# Patient Record
Sex: FEMALE | Race: BLACK OR AFRICAN AMERICAN | NOT HISPANIC OR LATINO
[De-identification: names, ages, dates, MRNs, and addresses within clinical notes are randomized per-mention and may not be internally consistent; named-entity substitution may affect disease eponyms.]

---

## 2023-01-01 ENCOUNTER — NON-APPOINTMENT (OUTPATIENT)
Age: 58
End: 2023-01-01

## 2023-01-01 ENCOUNTER — INPATIENT (INPATIENT)
Facility: HOSPITAL | Age: 58
LOS: 7 days | Discharge: HOME | End: 2023-09-14
Attending: STUDENT IN AN ORGANIZED HEALTH CARE EDUCATION/TRAINING PROGRAM | Admitting: STUDENT IN AN ORGANIZED HEALTH CARE EDUCATION/TRAINING PROGRAM
Payer: COMMERCIAL

## 2023-01-01 ENCOUNTER — RX RENEWAL (OUTPATIENT)
Age: 58
End: 2023-01-01

## 2023-01-01 ENCOUNTER — TRANSCRIPTION ENCOUNTER (OUTPATIENT)
Age: 58
End: 2023-01-01

## 2023-01-01 ENCOUNTER — EMERGENCY (EMERGENCY)
Facility: HOSPITAL | Age: 58
LOS: 1 days | Discharge: HOME | End: 2023-01-01
Attending: EMERGENCY MEDICINE | Admitting: EMERGENCY MEDICINE
Payer: COMMERCIAL

## 2023-01-01 ENCOUNTER — APPOINTMENT (OUTPATIENT)
Dept: GASTROENTEROLOGY | Facility: CLINIC | Age: 58
End: 2023-01-01
Payer: COMMERCIAL

## 2023-01-01 ENCOUNTER — INPATIENT (INPATIENT)
Facility: HOSPITAL | Age: 58
LOS: 3 days | End: 2023-09-22
Attending: STUDENT IN AN ORGANIZED HEALTH CARE EDUCATION/TRAINING PROGRAM | Admitting: HOSPITALIST
Payer: COMMERCIAL

## 2023-01-01 VITALS
TEMPERATURE: 98 F | RESPIRATION RATE: 16 BRPM | SYSTOLIC BLOOD PRESSURE: 164 MMHG | OXYGEN SATURATION: 99 % | DIASTOLIC BLOOD PRESSURE: 120 MMHG | HEART RATE: 95 BPM

## 2023-01-01 VITALS
TEMPERATURE: 97 F | HEART RATE: 94 BPM | HEIGHT: 70 IN | DIASTOLIC BLOOD PRESSURE: 85 MMHG | SYSTOLIC BLOOD PRESSURE: 114 MMHG | OXYGEN SATURATION: 99 % | RESPIRATION RATE: 22 BRPM

## 2023-01-01 VITALS
WEIGHT: 211 LBS | SYSTOLIC BLOOD PRESSURE: 159 MMHG | OXYGEN SATURATION: 99 % | HEART RATE: 114 BPM | HEIGHT: 69 IN | DIASTOLIC BLOOD PRESSURE: 127 MMHG | TEMPERATURE: 97.1 F | BODY MASS INDEX: 31.25 KG/M2

## 2023-01-01 VITALS
DIASTOLIC BLOOD PRESSURE: 71 MMHG | SYSTOLIC BLOOD PRESSURE: 108 MMHG | TEMPERATURE: 98 F | RESPIRATION RATE: 18 BRPM | HEART RATE: 85 BPM | OXYGEN SATURATION: 99 %

## 2023-01-01 VITALS
RESPIRATION RATE: 18 BRPM | SYSTOLIC BLOOD PRESSURE: 142 MMHG | DIASTOLIC BLOOD PRESSURE: 95 MMHG | TEMPERATURE: 98 F | HEART RATE: 100 BPM | OXYGEN SATURATION: 100 %

## 2023-01-01 DIAGNOSIS — A41.9 SEPSIS, UNSPECIFIED ORGANISM: ICD-10-CM

## 2023-01-01 DIAGNOSIS — Z29.9 ENCOUNTER FOR PROPHYLACTIC MEASURES, UNSPECIFIED: ICD-10-CM

## 2023-01-01 DIAGNOSIS — K76.89 OTHER SPECIFIED DISEASES OF LIVER: ICD-10-CM

## 2023-01-01 DIAGNOSIS — E87.8 OTHER DISORDERS OF ELECTROLYTE AND FLUID BALANCE, NOT ELSEWHERE CLASSIFIED: ICD-10-CM

## 2023-01-01 DIAGNOSIS — N17.9 ACUTE KIDNEY FAILURE, UNSPECIFIED: ICD-10-CM

## 2023-01-01 DIAGNOSIS — R10.84 GENERALIZED ABDOMINAL PAIN: ICD-10-CM

## 2023-01-01 DIAGNOSIS — I10 ESSENTIAL (PRIMARY) HYPERTENSION: ICD-10-CM

## 2023-01-01 DIAGNOSIS — R19.7 DIARRHEA, UNSPECIFIED: ICD-10-CM

## 2023-01-01 DIAGNOSIS — R10.13 EPIGASTRIC PAIN: ICD-10-CM

## 2023-01-01 DIAGNOSIS — E87.5 HYPERKALEMIA: ICD-10-CM

## 2023-01-01 DIAGNOSIS — K21.9 GASTRO-ESOPHAGEAL REFLUX DISEASE W/OUT ESOPHAGITIS: ICD-10-CM

## 2023-01-01 DIAGNOSIS — Z91.89 OTHER SPECIFIED PERSONAL RISK FACTORS, NOT ELSEWHERE CLASSIFIED: ICD-10-CM

## 2023-01-01 DIAGNOSIS — C78.6 SECONDARY MALIGNANT NEOPLASM OF RETROPERITONEUM AND PERITONEUM: ICD-10-CM

## 2023-01-01 DIAGNOSIS — I82.409 ACUTE EMBOLISM AND THROMBOSIS OF UNSPECIFIED DEEP VEINS OF UNSPECIFIED LOWER EXTREMITY: ICD-10-CM

## 2023-01-01 DIAGNOSIS — R74.01 ELEVATION OF LEVELS OF LIVER TRANSAMINASE LEVELS: ICD-10-CM

## 2023-01-01 DIAGNOSIS — K62.5 HEMORRHAGE OF ANUS AND RECTUM: ICD-10-CM

## 2023-01-01 DIAGNOSIS — D72.829 ELEVATED WHITE BLOOD CELL COUNT, UNSPECIFIED: ICD-10-CM

## 2023-01-01 DIAGNOSIS — C78.7 SECONDARY MALIGNANT NEOPLASM OF LIVER AND INTRAHEPATIC BILE DUCT: ICD-10-CM

## 2023-01-01 DIAGNOSIS — R63.4 ABNORMAL WEIGHT LOSS: ICD-10-CM

## 2023-01-01 DIAGNOSIS — Z98.891 HISTORY OF UTERINE SCAR FROM PREVIOUS SURGERY: Chronic | ICD-10-CM

## 2023-01-01 DIAGNOSIS — E88.3 TUMOR LYSIS SYNDROME: ICD-10-CM

## 2023-01-01 DIAGNOSIS — E87.1 HYPO-OSMOLALITY AND HYPONATREMIA: ICD-10-CM

## 2023-01-01 DIAGNOSIS — Z12.11 ENCOUNTER FOR SCREENING FOR MALIGNANT NEOPLASM OF COLON: ICD-10-CM

## 2023-01-01 DIAGNOSIS — R73.9 HYPERGLYCEMIA, UNSPECIFIED: ICD-10-CM

## 2023-01-01 DIAGNOSIS — R11.2 NAUSEA WITH VOMITING, UNSPECIFIED: ICD-10-CM

## 2023-01-01 DIAGNOSIS — D64.9 ANEMIA, UNSPECIFIED: ICD-10-CM

## 2023-01-01 DIAGNOSIS — E87.20 ACIDOSIS, UNSPECIFIED: ICD-10-CM

## 2023-01-01 DIAGNOSIS — K52.9 NONINFECTIVE GASTROENTERITIS AND COLITIS, UNSPECIFIED: ICD-10-CM

## 2023-01-01 DIAGNOSIS — C80.1 MALIGNANT (PRIMARY) NEOPLASM, UNSPECIFIED: ICD-10-CM

## 2023-01-01 LAB
A1C WITH ESTIMATED AVERAGE GLUCOSE RESULT: 5.4 % — SIGNIFICANT CHANGE UP (ref 4–5.6)
AFP-TM SERPL-MCNC: 5.5 NG/ML — SIGNIFICANT CHANGE UP
ALBUMIN SERPL ELPH-MCNC: 1.8 G/DL — LOW (ref 3.3–5)
ALBUMIN SERPL ELPH-MCNC: 1.9 G/DL — LOW (ref 3.3–5)
ALBUMIN SERPL ELPH-MCNC: 2 G/DL — LOW (ref 3.3–5)
ALBUMIN SERPL ELPH-MCNC: 2.2 G/DL — LOW (ref 3.3–5)
ALBUMIN SERPL ELPH-MCNC: 2.2 G/DL — LOW (ref 3.3–5)
ALBUMIN SERPL ELPH-MCNC: 2.3 G/DL — LOW (ref 3.3–5)
ALBUMIN SERPL ELPH-MCNC: 2.4 G/DL — LOW (ref 3.3–5)
ALBUMIN SERPL ELPH-MCNC: 2.5 G/DL — LOW (ref 3.3–5)
ALBUMIN SERPL ELPH-MCNC: 2.6 G/DL — LOW (ref 3.3–5)
ALBUMIN SERPL ELPH-MCNC: 2.7 G/DL — LOW (ref 3.3–5)
ALBUMIN SERPL ELPH-MCNC: 2.8 G/DL — LOW (ref 3.3–5)
ALBUMIN SERPL ELPH-MCNC: 3 G/DL — LOW (ref 3.3–5)
ALBUMIN SERPL ELPH-MCNC: 4.2 G/DL — SIGNIFICANT CHANGE UP (ref 3.3–5)
ALP SERPL-CCNC: 1053 U/L — HIGH (ref 40–120)
ALP SERPL-CCNC: 1070 U/L — HIGH (ref 40–120)
ALP SERPL-CCNC: 1833 U/L — HIGH (ref 40–120)
ALP SERPL-CCNC: 204 U/L — HIGH (ref 40–120)
ALP SERPL-CCNC: 511 U/L — HIGH (ref 40–120)
ALP SERPL-CCNC: 511 U/L — HIGH (ref 40–120)
ALP SERPL-CCNC: 523 U/L — HIGH (ref 40–120)
ALP SERPL-CCNC: 538 U/L — HIGH (ref 40–120)
ALP SERPL-CCNC: 547 U/L — HIGH (ref 40–120)
ALP SERPL-CCNC: 550 U/L — HIGH (ref 40–120)
ALP SERPL-CCNC: 562 U/L — HIGH (ref 40–120)
ALP SERPL-CCNC: 597 U/L — HIGH (ref 40–120)
ALP SERPL-CCNC: 625 U/L — HIGH (ref 40–120)
ALP SERPL-CCNC: 631 U/L — HIGH (ref 40–120)
ALP SERPL-CCNC: 643 U/L — HIGH (ref 40–120)
ALP SERPL-CCNC: 665 U/L — HIGH (ref 40–120)
ALP SERPL-CCNC: 793 U/L — HIGH (ref 40–120)
ALP SERPL-CCNC: 827 U/L — HIGH (ref 40–120)
ALP SERPL-CCNC: 833 U/L — HIGH (ref 40–120)
ALP SERPL-CCNC: 896 U/L — HIGH (ref 40–120)
ALP SERPL-CCNC: 925 U/L — HIGH (ref 40–120)
ALP SERPL-CCNC: 954 U/L — HIGH (ref 40–120)
ALT FLD-CCNC: 102 U/L — HIGH (ref 4–33)
ALT FLD-CCNC: 123 U/L — HIGH (ref 4–33)
ALT FLD-CCNC: 332 U/L — HIGH (ref 4–33)
ALT FLD-CCNC: 36 U/L — HIGH (ref 4–33)
ALT FLD-CCNC: 36 U/L — HIGH (ref 4–33)
ALT FLD-CCNC: 39 U/L — HIGH (ref 4–33)
ALT FLD-CCNC: 41 U/L — HIGH (ref 4–33)
ALT FLD-CCNC: 42 U/L — HIGH (ref 4–33)
ALT FLD-CCNC: 43 U/L — HIGH (ref 4–33)
ALT FLD-CCNC: 44 U/L — HIGH (ref 4–33)
ALT FLD-CCNC: 44 U/L — HIGH (ref 4–33)
ALT FLD-CCNC: 55 U/L — HIGH (ref 4–33)
ALT FLD-CCNC: 56 U/L — HIGH (ref 4–33)
ALT FLD-CCNC: 57 U/L — HIGH (ref 4–33)
ALT FLD-CCNC: 60 U/L — HIGH (ref 4–33)
ALT FLD-CCNC: 61 U/L — HIGH (ref 4–33)
ALT FLD-CCNC: 62 U/L — HIGH (ref 4–33)
ALT FLD-CCNC: 65 U/L — HIGH (ref 4–33)
ALT FLD-CCNC: 671 U/L — HIGH (ref 4–33)
ALT FLD-CCNC: 69 U/L — HIGH (ref 4–33)
ALT FLD-CCNC: 75 U/L — HIGH (ref 4–33)
ALT FLD-CCNC: 94 U/L — HIGH (ref 4–33)
ANION GAP SERPL CALC-SCNC: 10 MMOL/L — SIGNIFICANT CHANGE UP (ref 7–14)
ANION GAP SERPL CALC-SCNC: 11 MMOL/L — SIGNIFICANT CHANGE UP (ref 7–14)
ANION GAP SERPL CALC-SCNC: 11 MMOL/L — SIGNIFICANT CHANGE UP (ref 7–14)
ANION GAP SERPL CALC-SCNC: 12 MMOL/L — SIGNIFICANT CHANGE UP (ref 7–14)
ANION GAP SERPL CALC-SCNC: 13 MMOL/L — SIGNIFICANT CHANGE UP (ref 7–14)
ANION GAP SERPL CALC-SCNC: 15 MMOL/L — HIGH (ref 7–14)
ANION GAP SERPL CALC-SCNC: 15 MMOL/L — HIGH (ref 7–14)
ANION GAP SERPL CALC-SCNC: 16 MMOL/L — HIGH (ref 7–14)
ANION GAP SERPL CALC-SCNC: 17 MMOL/L — HIGH (ref 7–14)
ANION GAP SERPL CALC-SCNC: 18 MMOL/L — HIGH (ref 7–14)
ANION GAP SERPL CALC-SCNC: 19 MMOL/L — HIGH (ref 7–14)
ANION GAP SERPL CALC-SCNC: 20 MMOL/L — HIGH (ref 7–14)
ANION GAP SERPL CALC-SCNC: 21 MMOL/L — HIGH (ref 7–14)
ANION GAP SERPL CALC-SCNC: 21 MMOL/L — HIGH (ref 7–14)
ANION GAP SERPL CALC-SCNC: 22 MMOL/L — HIGH (ref 7–14)
ANION GAP SERPL CALC-SCNC: 23 MMOL/L — HIGH (ref 7–14)
ANION GAP SERPL CALC-SCNC: 26 MMOL/L — HIGH (ref 7–14)
ANION GAP SERPL CALC-SCNC: 27 MMOL/L — HIGH (ref 7–14)
ANION GAP SERPL CALC-SCNC: 29 MMOL/L — HIGH (ref 7–14)
ANION GAP SERPL CALC-SCNC: 29 MMOL/L — HIGH (ref 7–14)
ANION GAP SERPL CALC-SCNC: SIGNIFICANT CHANGE UP MMOL/L (ref 7–14)
ANISOCYTOSIS BLD QL: SLIGHT — SIGNIFICANT CHANGE UP
APPEARANCE UR: ABNORMAL
APTT BLD: 100.1 SEC — HIGH (ref 24.5–35.6)
APTT BLD: 128.3 SEC — SIGNIFICANT CHANGE UP (ref 24.5–35.6)
APTT BLD: 134.9 SEC — CRITICAL HIGH (ref 24.5–35.6)
APTT BLD: 185.5 SEC — CRITICAL HIGH (ref 24.5–35.6)
APTT BLD: 27.2 SEC — SIGNIFICANT CHANGE UP (ref 24.5–35.6)
APTT BLD: 27.5 SEC — SIGNIFICANT CHANGE UP (ref 24.5–35.6)
APTT BLD: 29.1 SEC — SIGNIFICANT CHANGE UP (ref 24.5–35.6)
APTT BLD: 44.4 SEC — HIGH (ref 24.5–35.6)
APTT BLD: 49.5 SEC — HIGH (ref 24.5–35.6)
APTT BLD: 54.6 SEC — HIGH (ref 24.5–35.6)
APTT BLD: 57.7 SEC — HIGH (ref 24.5–35.6)
APTT BLD: 60.1 SEC — HIGH (ref 24.5–35.6)
APTT BLD: 60.5 SEC — HIGH (ref 24.5–35.6)
APTT BLD: 61.8 SEC — HIGH (ref 24.5–35.6)
APTT BLD: 63.7 SEC — HIGH (ref 24.5–35.6)
APTT BLD: 65.8 SEC — HIGH (ref 24.5–35.6)
APTT BLD: 66.7 SEC — HIGH (ref 24.5–35.6)
APTT BLD: 68.1 SEC — HIGH (ref 24.5–35.6)
APTT BLD: 69.2 SEC — HIGH (ref 24.5–35.6)
APTT BLD: >200 SEC — CRITICAL HIGH (ref 24.5–35.6)
APTT BLD: >200 SEC — CRITICAL HIGH (ref 24.5–35.6)
AST SERPL-CCNC: 110 U/L — HIGH (ref 4–32)
AST SERPL-CCNC: 115 U/L — HIGH (ref 4–32)
AST SERPL-CCNC: 115 U/L — HIGH (ref 4–32)
AST SERPL-CCNC: 126 U/L — HIGH (ref 4–32)
AST SERPL-CCNC: 133 U/L — HIGH (ref 4–32)
AST SERPL-CCNC: 145 U/L — HIGH (ref 4–32)
AST SERPL-CCNC: 1461 U/L — HIGH (ref 4–32)
AST SERPL-CCNC: 149 U/L — HIGH (ref 4–32)
AST SERPL-CCNC: 162 U/L — HIGH (ref 4–32)
AST SERPL-CCNC: 163 U/L — HIGH (ref 4–32)
AST SERPL-CCNC: 172 U/L — HIGH (ref 4–32)
AST SERPL-CCNC: 252 U/L — HIGH (ref 4–32)
AST SERPL-CCNC: 320 U/L — HIGH (ref 4–32)
AST SERPL-CCNC: 335 U/L — HIGH (ref 4–32)
AST SERPL-CCNC: 437 U/L — HIGH (ref 4–32)
AST SERPL-CCNC: 5015 U/L — HIGH (ref 4–32)
AST SERPL-CCNC: 60 U/L — HIGH (ref 4–32)
AST SERPL-CCNC: 91 U/L — HIGH (ref 4–32)
AST SERPL-CCNC: 95 U/L — HIGH (ref 4–32)
AST SERPL-CCNC: 96 U/L — HIGH (ref 4–32)
AST SERPL-CCNC: 97 U/L — HIGH (ref 4–32)
AST SERPL-CCNC: 99 U/L — HIGH (ref 4–32)
B PERT DNA SPEC QL NAA+PROBE: SIGNIFICANT CHANGE UP
B PERT+PARAPERT DNA PNL SPEC NAA+PROBE: SIGNIFICANT CHANGE UP
BACTERIA # UR AUTO: ABNORMAL /HPF
BASE EXCESS BLDV CALC-SCNC: -12.1 MMOL/L — LOW (ref -2–3)
BASE EXCESS BLDV CALC-SCNC: -14 MMOL/L — LOW (ref -2–3)
BASE EXCESS BLDV CALC-SCNC: -15.7 MMOL/L — LOW (ref -2–3)
BASE EXCESS BLDV CALC-SCNC: -8.4 MMOL/L — LOW (ref -2–3)
BASE EXCESS BLDV CALC-SCNC: -9.5 MMOL/L — LOW (ref -2–3)
BASOPHILS # BLD AUTO: 0 K/UL — SIGNIFICANT CHANGE UP (ref 0–0.2)
BASOPHILS # BLD AUTO: 0 K/UL — SIGNIFICANT CHANGE UP (ref 0–0.2)
BASOPHILS # BLD AUTO: 0.03 K/UL — SIGNIFICANT CHANGE UP (ref 0–0.2)
BASOPHILS # BLD AUTO: 0.05 K/UL — SIGNIFICANT CHANGE UP (ref 0–0.2)
BASOPHILS # BLD AUTO: 0.06 K/UL — SIGNIFICANT CHANGE UP (ref 0–0.2)
BASOPHILS # BLD AUTO: 0.07 K/UL — SIGNIFICANT CHANGE UP (ref 0–0.2)
BASOPHILS # BLD AUTO: 0.07 K/UL — SIGNIFICANT CHANGE UP (ref 0–0.2)
BASOPHILS NFR BLD AUTO: 0 % — SIGNIFICANT CHANGE UP (ref 0–2)
BASOPHILS NFR BLD AUTO: 0 % — SIGNIFICANT CHANGE UP (ref 0–2)
BASOPHILS NFR BLD AUTO: 0.2 % — SIGNIFICANT CHANGE UP (ref 0–2)
BASOPHILS NFR BLD AUTO: 0.2 % — SIGNIFICANT CHANGE UP (ref 0–2)
BASOPHILS NFR BLD AUTO: 0.3 % — SIGNIFICANT CHANGE UP (ref 0–2)
BASOPHILS NFR BLD AUTO: 0.4 % — SIGNIFICANT CHANGE UP (ref 0–2)
BILIRUB SERPL-MCNC: 0.2 MG/DL — SIGNIFICANT CHANGE UP (ref 0.2–1.2)
BILIRUB SERPL-MCNC: 0.5 MG/DL — SIGNIFICANT CHANGE UP (ref 0.2–1.2)
BILIRUB SERPL-MCNC: 0.6 MG/DL — SIGNIFICANT CHANGE UP (ref 0.2–1.2)
BILIRUB SERPL-MCNC: 0.7 MG/DL — SIGNIFICANT CHANGE UP (ref 0.2–1.2)
BILIRUB SERPL-MCNC: 0.7 MG/DL — SIGNIFICANT CHANGE UP (ref 0.2–1.2)
BILIRUB SERPL-MCNC: 0.8 MG/DL — SIGNIFICANT CHANGE UP (ref 0.2–1.2)
BILIRUB SERPL-MCNC: 0.8 MG/DL — SIGNIFICANT CHANGE UP (ref 0.2–1.2)
BILIRUB SERPL-MCNC: 0.9 MG/DL — SIGNIFICANT CHANGE UP (ref 0.2–1.2)
BILIRUB SERPL-MCNC: 1.3 MG/DL — HIGH (ref 0.2–1.2)
BILIRUB SERPL-MCNC: 1.3 MG/DL — HIGH (ref 0.2–1.2)
BILIRUB SERPL-MCNC: 1.6 MG/DL — HIGH (ref 0.2–1.2)
BILIRUB SERPL-MCNC: 2.6 MG/DL — HIGH (ref 0.2–1.2)
BILIRUB SERPL-MCNC: 3 MG/DL — HIGH (ref 0.2–1.2)
BILIRUB UR-MCNC: ABNORMAL
BILIRUB UR-MCNC: ABNORMAL
BILIRUB UR-MCNC: NEGATIVE — SIGNIFICANT CHANGE UP
BLD GP AB SCN SERPL QL: NEGATIVE — SIGNIFICANT CHANGE UP
BLOOD GAS COMMENTS, VENOUS: SIGNIFICANT CHANGE UP
BLOOD GAS COMMENTS, VENOUS: SIGNIFICANT CHANGE UP
BLOOD GAS VENOUS COMPREHENSIVE RESULT: SIGNIFICANT CHANGE UP
BORDETELLA PARAPERTUSSIS (RAPRVP): SIGNIFICANT CHANGE UP
BUN SERPL-MCNC: 22 MG/DL — SIGNIFICANT CHANGE UP (ref 7–23)
BUN SERPL-MCNC: 29 MG/DL — HIGH (ref 7–23)
BUN SERPL-MCNC: 40 MG/DL — HIGH (ref 7–23)
BUN SERPL-MCNC: 47 MG/DL — HIGH (ref 7–23)
BUN SERPL-MCNC: 48 MG/DL — HIGH (ref 7–23)
BUN SERPL-MCNC: 51 MG/DL — HIGH (ref 7–23)
BUN SERPL-MCNC: 54 MG/DL — HIGH (ref 7–23)
BUN SERPL-MCNC: 55 MG/DL — HIGH (ref 7–23)
BUN SERPL-MCNC: 68 MG/DL — HIGH (ref 7–23)
BUN SERPL-MCNC: 71 MG/DL — HIGH (ref 7–23)
BUN SERPL-MCNC: 74 MG/DL — HIGH (ref 7–23)
BUN SERPL-MCNC: 75 MG/DL — HIGH (ref 7–23)
BUN SERPL-MCNC: 77 MG/DL — HIGH (ref 7–23)
BUN SERPL-MCNC: 78 MG/DL — HIGH (ref 7–23)
BUN SERPL-MCNC: 8 MG/DL — SIGNIFICANT CHANGE UP (ref 7–23)
BUN SERPL-MCNC: 83 MG/DL — HIGH (ref 7–23)
BUN SERPL-MCNC: 84 MG/DL — HIGH (ref 7–23)
BUN SERPL-MCNC: 91 MG/DL — HIGH (ref 7–23)
BUN SERPL-MCNC: 91 MG/DL — HIGH (ref 7–23)
BUN SERPL-MCNC: 93 MG/DL — HIGH (ref 7–23)
BUN SERPL-MCNC: 94 MG/DL — HIGH (ref 7–23)
BUN SERPL-MCNC: 95 MG/DL — HIGH (ref 7–23)
BUN SERPL-MCNC: 96 MG/DL — HIGH (ref 7–23)
BUN SERPL-MCNC: 96 MG/DL — HIGH (ref 7–23)
BUN SERPL-MCNC: 97 MG/DL — HIGH (ref 7–23)
BUN SERPL-MCNC: 99 MG/DL — HIGH (ref 7–23)
C DIFF BY PCR RESULT: SIGNIFICANT CHANGE UP
C PNEUM DNA SPEC QL NAA+PROBE: SIGNIFICANT CHANGE UP
CA-I BLD-SCNC: 1.05 MMOL/L — LOW (ref 1.15–1.29)
CALCIUM SERPL-MCNC: 7.7 MG/DL — LOW (ref 8.4–10.5)
CALCIUM SERPL-MCNC: 7.9 MG/DL — LOW (ref 8.4–10.5)
CALCIUM SERPL-MCNC: 8 MG/DL — LOW (ref 8.4–10.5)
CALCIUM SERPL-MCNC: 8 MG/DL — LOW (ref 8.4–10.5)
CALCIUM SERPL-MCNC: 8.1 MG/DL — LOW (ref 8.4–10.5)
CALCIUM SERPL-MCNC: 8.1 MG/DL — LOW (ref 8.4–10.5)
CALCIUM SERPL-MCNC: 8.2 MG/DL — LOW (ref 8.4–10.5)
CALCIUM SERPL-MCNC: 8.3 MG/DL — LOW (ref 8.4–10.5)
CALCIUM SERPL-MCNC: 8.3 MG/DL — LOW (ref 8.4–10.5)
CALCIUM SERPL-MCNC: 8.4 MG/DL — SIGNIFICANT CHANGE UP (ref 8.4–10.5)
CALCIUM SERPL-MCNC: 8.5 MG/DL — SIGNIFICANT CHANGE UP (ref 8.4–10.5)
CALCIUM SERPL-MCNC: 8.5 MG/DL — SIGNIFICANT CHANGE UP (ref 8.4–10.5)
CALCIUM SERPL-MCNC: 8.6 MG/DL — SIGNIFICANT CHANGE UP (ref 8.4–10.5)
CALCIUM SERPL-MCNC: 8.7 MG/DL — SIGNIFICANT CHANGE UP (ref 8.4–10.5)
CALCIUM SERPL-MCNC: 8.7 MG/DL — SIGNIFICANT CHANGE UP (ref 8.4–10.5)
CALCIUM SERPL-MCNC: 8.8 MG/DL — SIGNIFICANT CHANGE UP (ref 8.4–10.5)
CALCIUM SERPL-MCNC: 9.1 MG/DL — SIGNIFICANT CHANGE UP (ref 8.4–10.5)
CALCIUM SERPL-MCNC: 9.1 MG/DL — SIGNIFICANT CHANGE UP (ref 8.4–10.5)
CALCIUM SERPL-MCNC: 9.3 MG/DL — SIGNIFICANT CHANGE UP (ref 8.4–10.5)
CALCIUM SERPL-MCNC: 9.7 MG/DL — SIGNIFICANT CHANGE UP (ref 8.4–10.5)
CANCER AG19-9 SERPL-ACNC: 4448 U/ML — HIGH
CAST: 17 /LPF — HIGH (ref 0–4)
CAST: 91 /LPF — HIGH (ref 0–4)
CEA SERPL-MCNC: 310 NG/ML — HIGH (ref 1–3.8)
CHLORIDE BLDV-SCNC: 110 MMOL/L — HIGH (ref 96–108)
CHLORIDE BLDV-SCNC: 111 MMOL/L — HIGH (ref 96–108)
CHLORIDE BLDV-SCNC: 113 MMOL/L — HIGH (ref 96–108)
CHLORIDE SERPL-SCNC: 100 MMOL/L — SIGNIFICANT CHANGE UP (ref 98–107)
CHLORIDE SERPL-SCNC: 102 MMOL/L — SIGNIFICANT CHANGE UP (ref 98–107)
CHLORIDE SERPL-SCNC: 105 MMOL/L — SIGNIFICANT CHANGE UP (ref 98–107)
CHLORIDE SERPL-SCNC: 106 MMOL/L — SIGNIFICANT CHANGE UP (ref 98–107)
CHLORIDE SERPL-SCNC: 107 MMOL/L — SIGNIFICANT CHANGE UP (ref 98–107)
CHLORIDE SERPL-SCNC: 108 MMOL/L — HIGH (ref 98–107)
CHLORIDE SERPL-SCNC: 109 MMOL/L — HIGH (ref 98–107)
CHLORIDE SERPL-SCNC: 109 MMOL/L — HIGH (ref 98–107)
CHLORIDE SERPL-SCNC: 111 MMOL/L — HIGH (ref 98–107)
CHLORIDE SERPL-SCNC: 112 MMOL/L — HIGH (ref 98–107)
CHLORIDE SERPL-SCNC: 92 MMOL/L — LOW (ref 98–107)
CHLORIDE SERPL-SCNC: 93 MMOL/L — LOW (ref 98–107)
CHLORIDE SERPL-SCNC: 94 MMOL/L — LOW (ref 98–107)
CHLORIDE SERPL-SCNC: 94 MMOL/L — LOW (ref 98–107)
CHLORIDE SERPL-SCNC: 95 MMOL/L — LOW (ref 98–107)
CHLORIDE SERPL-SCNC: 95 MMOL/L — LOW (ref 98–107)
CHLORIDE SERPL-SCNC: 96 MMOL/L — LOW (ref 98–107)
CHLORIDE SERPL-SCNC: 98 MMOL/L — SIGNIFICANT CHANGE UP (ref 98–107)
CHLORIDE SERPL-SCNC: 99 MMOL/L — SIGNIFICANT CHANGE UP (ref 98–107)
CHLORIDE SERPL-SCNC: 99 MMOL/L — SIGNIFICANT CHANGE UP (ref 98–107)
CHLORIDE SERPL-SCNC: SIGNIFICANT CHANGE UP MMOL/L (ref 98–107)
CHOLEST SERPL-MCNC: 108 MG/DL — SIGNIFICANT CHANGE UP
CHOLEST SERPL-MCNC: 136 MG/DL — SIGNIFICANT CHANGE UP
CO2 BLDV-SCNC: 13.9 MMOL/L — LOW (ref 22–26)
CO2 BLDV-SCNC: 14.6 MMOL/L — LOW (ref 22–26)
CO2 BLDV-SCNC: 14.9 MMOL/L — LOW (ref 22–26)
CO2 BLDV-SCNC: 17.5 MMOL/L — LOW (ref 22–26)
CO2 BLDV-SCNC: 19.1 MMOL/L — LOW (ref 22–26)
CO2 SERPL-SCNC: 11 MMOL/L — LOW (ref 22–31)
CO2 SERPL-SCNC: 11 MMOL/L — LOW (ref 22–31)
CO2 SERPL-SCNC: 13 MMOL/L — LOW (ref 22–31)
CO2 SERPL-SCNC: 13 MMOL/L — LOW (ref 22–31)
CO2 SERPL-SCNC: 14 MMOL/L — LOW (ref 22–31)
CO2 SERPL-SCNC: 16 MMOL/L — LOW (ref 22–31)
CO2 SERPL-SCNC: 17 MMOL/L — LOW (ref 22–31)
CO2 SERPL-SCNC: 18 MMOL/L — LOW (ref 22–31)
CO2 SERPL-SCNC: 18 MMOL/L — LOW (ref 22–31)
CO2 SERPL-SCNC: 19 MMOL/L — LOW (ref 22–31)
CO2 SERPL-SCNC: 20 MMOL/L — LOW (ref 22–31)
CO2 SERPL-SCNC: 20 MMOL/L — LOW (ref 22–31)
CO2 SERPL-SCNC: 24 MMOL/L — SIGNIFICANT CHANGE UP (ref 22–31)
CO2 SERPL-SCNC: 7 MMOL/L — CRITICAL LOW (ref 22–31)
CO2 SERPL-SCNC: 7 MMOL/L — CRITICAL LOW (ref 22–31)
CO2 SERPL-SCNC: 9 MMOL/L — CRITICAL LOW (ref 22–31)
COLOR SPEC: SIGNIFICANT CHANGE UP
COLOR SPEC: SIGNIFICANT CHANGE UP
COLOR SPEC: YELLOW — SIGNIFICANT CHANGE UP
CORTIS AM PEAK SERPL-MCNC: 25.6 UG/DL — HIGH (ref 6–18.4)
CREAT ?TM UR-MCNC: 236 MG/DL — SIGNIFICANT CHANGE UP
CREAT SERPL-MCNC: 0.97 MG/DL — SIGNIFICANT CHANGE UP (ref 0.5–1.3)
CREAT SERPL-MCNC: 1.1 MG/DL — SIGNIFICANT CHANGE UP (ref 0.5–1.3)
CREAT SERPL-MCNC: 1.33 MG/DL — HIGH (ref 0.5–1.3)
CREAT SERPL-MCNC: 1.58 MG/DL — HIGH (ref 0.5–1.3)
CREAT SERPL-MCNC: 1.6 MG/DL — HIGH (ref 0.5–1.3)
CREAT SERPL-MCNC: 1.62 MG/DL — HIGH (ref 0.5–1.3)
CREAT SERPL-MCNC: 1.76 MG/DL — HIGH (ref 0.5–1.3)
CREAT SERPL-MCNC: 1.77 MG/DL — HIGH (ref 0.5–1.3)
CREAT SERPL-MCNC: 1.78 MG/DL — HIGH (ref 0.5–1.3)
CREAT SERPL-MCNC: 2.02 MG/DL — HIGH (ref 0.5–1.3)
CREAT SERPL-MCNC: 2.13 MG/DL — HIGH (ref 0.5–1.3)
CREAT SERPL-MCNC: 2.14 MG/DL — HIGH (ref 0.5–1.3)
CREAT SERPL-MCNC: 2.23 MG/DL — HIGH (ref 0.5–1.3)
CREAT SERPL-MCNC: 2.38 MG/DL — HIGH (ref 0.5–1.3)
CREAT SERPL-MCNC: 2.72 MG/DL — HIGH (ref 0.5–1.3)
CREAT SERPL-MCNC: 2.87 MG/DL — HIGH (ref 0.5–1.3)
CREAT SERPL-MCNC: 2.88 MG/DL — HIGH (ref 0.5–1.3)
CREAT SERPL-MCNC: 2.92 MG/DL — HIGH (ref 0.5–1.3)
CREAT SERPL-MCNC: 3.03 MG/DL — HIGH (ref 0.5–1.3)
CREAT SERPL-MCNC: 3.25 MG/DL — HIGH (ref 0.5–1.3)
CREAT SERPL-MCNC: 3.35 MG/DL — HIGH (ref 0.5–1.3)
CREAT SERPL-MCNC: 3.45 MG/DL — HIGH (ref 0.5–1.3)
CREAT SERPL-MCNC: 3.63 MG/DL — HIGH (ref 0.5–1.3)
CREAT SERPL-MCNC: 3.83 MG/DL — HIGH (ref 0.5–1.3)
CREAT SERPL-MCNC: 4.11 MG/DL — HIGH (ref 0.5–1.3)
CREAT SERPL-MCNC: 4.37 MG/DL — HIGH (ref 0.5–1.3)
CREAT SERPL-MCNC: 4.66 MG/DL — HIGH (ref 0.5–1.3)
CREAT SERPL-MCNC: 4.7 MG/DL — HIGH (ref 0.5–1.3)
CULTURE RESULTS: NO GROWTH — SIGNIFICANT CHANGE UP
CULTURE RESULTS: NO GROWTH — SIGNIFICANT CHANGE UP
CULTURE RESULTS: SIGNIFICANT CHANGE UP
D DIMER BLD IA.RAPID-MCNC: 2331 NG/ML DDU — HIGH
D DIMER BLD IA.RAPID-MCNC: 8639 NG/ML DDU — SIGNIFICANT CHANGE UP
DIFF PNL FLD: ABNORMAL
DIFF PNL FLD: ABNORMAL
DIFF PNL FLD: NEGATIVE — SIGNIFICANT CHANGE UP
EGFR: 10 ML/MIN/1.73M2 — LOW
EGFR: 10 ML/MIN/1.73M2 — LOW
EGFR: 11 ML/MIN/1.73M2 — LOW
EGFR: 12 ML/MIN/1.73M2 — LOW
EGFR: 13 ML/MIN/1.73M2 — LOW
EGFR: 14 ML/MIN/1.73M2 — LOW
EGFR: 15 ML/MIN/1.73M2 — LOW
EGFR: 15 ML/MIN/1.73M2 — LOW
EGFR: 16 ML/MIN/1.73M2 — LOW
EGFR: 17 ML/MIN/1.73M2 — LOW
EGFR: 18 ML/MIN/1.73M2 — LOW
EGFR: 18 ML/MIN/1.73M2 — LOW
EGFR: 19 ML/MIN/1.73M2 — LOW
EGFR: 20 ML/MIN/1.73M2 — LOW
EGFR: 23 ML/MIN/1.73M2 — LOW
EGFR: 25 ML/MIN/1.73M2 — LOW
EGFR: 26 ML/MIN/1.73M2 — LOW
EGFR: 27 ML/MIN/1.73M2 — LOW
EGFR: 28 ML/MIN/1.73M2 — LOW
EGFR: 33 ML/MIN/1.73M2 — LOW
EGFR: 37 ML/MIN/1.73M2 — LOW
EGFR: 37 ML/MIN/1.73M2 — LOW
EGFR: 38 ML/MIN/1.73M2 — LOW
EGFR: 47 ML/MIN/1.73M2 — LOW
EGFR: 59 ML/MIN/1.73M2 — LOW
EGFR: 68 ML/MIN/1.73M2 — SIGNIFICANT CHANGE UP
EOSINOPHIL # BLD AUTO: 0 K/UL — SIGNIFICANT CHANGE UP (ref 0–0.5)
EOSINOPHIL # BLD AUTO: 0 K/UL — SIGNIFICANT CHANGE UP (ref 0–0.5)
EOSINOPHIL # BLD AUTO: 0.02 K/UL — SIGNIFICANT CHANGE UP (ref 0–0.5)
EOSINOPHIL # BLD AUTO: 0.02 K/UL — SIGNIFICANT CHANGE UP (ref 0–0.5)
EOSINOPHIL # BLD AUTO: 0.03 K/UL — SIGNIFICANT CHANGE UP (ref 0–0.5)
EOSINOPHIL # BLD AUTO: 0.04 K/UL — SIGNIFICANT CHANGE UP (ref 0–0.5)
EOSINOPHIL # BLD AUTO: 0.05 K/UL — SIGNIFICANT CHANGE UP (ref 0–0.5)
EOSINOPHIL # BLD AUTO: 0.06 K/UL — SIGNIFICANT CHANGE UP (ref 0–0.5)
EOSINOPHIL # BLD AUTO: 0.07 K/UL — SIGNIFICANT CHANGE UP (ref 0–0.5)
EOSINOPHIL # BLD AUTO: 0.09 K/UL — SIGNIFICANT CHANGE UP (ref 0–0.5)
EOSINOPHIL NFR BLD AUTO: 0 % — SIGNIFICANT CHANGE UP (ref 0–6)
EOSINOPHIL NFR BLD AUTO: 0 % — SIGNIFICANT CHANGE UP (ref 0–6)
EOSINOPHIL NFR BLD AUTO: 0.1 % — SIGNIFICANT CHANGE UP (ref 0–6)
EOSINOPHIL NFR BLD AUTO: 0.2 % — SIGNIFICANT CHANGE UP (ref 0–6)
EOSINOPHIL NFR BLD AUTO: 0.2 % — SIGNIFICANT CHANGE UP (ref 0–6)
EOSINOPHIL NFR BLD AUTO: 0.3 % — SIGNIFICANT CHANGE UP (ref 0–6)
EOSINOPHIL NFR BLD AUTO: 0.3 % — SIGNIFICANT CHANGE UP (ref 0–6)
EOSINOPHIL NFR BLD AUTO: 0.4 % — SIGNIFICANT CHANGE UP (ref 0–6)
EOSINOPHIL NFR BLD AUTO: 0.4 % — SIGNIFICANT CHANGE UP (ref 0–6)
EOSINOPHIL NFR BLD AUTO: 0.5 % — SIGNIFICANT CHANGE UP (ref 0–6)
EPI CELLS # UR: PRESENT
ESTIMATED AVERAGE GLUCOSE: 108 — SIGNIFICANT CHANGE UP
FIBRINOGEN PPP-MCNC: 156 MG/DL — LOW (ref 200–465)
FLUAV SUBTYP SPEC NAA+PROBE: SIGNIFICANT CHANGE UP
FLUBV RNA SPEC QL NAA+PROBE: SIGNIFICANT CHANGE UP
G6PD RBC-CCNC: 21.3 U/G HGB — HIGH (ref 7–20.5)
GAS PNL BLDA: SIGNIFICANT CHANGE UP
GAS PNL BLDV: 137 MMOL/L — SIGNIFICANT CHANGE UP (ref 136–145)
GAS PNL BLDV: 138 MMOL/L — SIGNIFICANT CHANGE UP (ref 136–145)
GAS PNL BLDV: 139 MMOL/L — SIGNIFICANT CHANGE UP (ref 136–145)
GAS PNL BLDV: SIGNIFICANT CHANGE UP
GI PCR PANEL: SIGNIFICANT CHANGE UP
GLUCOSE BLDC GLUCOMTR-MCNC: 101 MG/DL — HIGH (ref 70–99)
GLUCOSE BLDC GLUCOMTR-MCNC: 102 MG/DL — HIGH (ref 70–99)
GLUCOSE BLDC GLUCOMTR-MCNC: 103 MG/DL — HIGH (ref 70–99)
GLUCOSE BLDC GLUCOMTR-MCNC: 109 MG/DL — HIGH (ref 70–99)
GLUCOSE BLDC GLUCOMTR-MCNC: 142 MG/DL — HIGH (ref 70–99)
GLUCOSE BLDV-MCNC: 105 MG/DL — HIGH (ref 70–99)
GLUCOSE BLDV-MCNC: 110 MG/DL — HIGH (ref 70–99)
GLUCOSE BLDV-MCNC: 127 MG/DL — HIGH (ref 70–99)
GLUCOSE SERPL-MCNC: 102 MG/DL — HIGH (ref 70–99)
GLUCOSE SERPL-MCNC: 105 MG/DL — HIGH (ref 70–99)
GLUCOSE SERPL-MCNC: 106 MG/DL — HIGH (ref 70–99)
GLUCOSE SERPL-MCNC: 107 MG/DL — HIGH (ref 70–99)
GLUCOSE SERPL-MCNC: 108 MG/DL — HIGH (ref 70–99)
GLUCOSE SERPL-MCNC: 109 MG/DL — HIGH (ref 70–99)
GLUCOSE SERPL-MCNC: 111 MG/DL — HIGH (ref 70–99)
GLUCOSE SERPL-MCNC: 112 MG/DL — HIGH (ref 70–99)
GLUCOSE SERPL-MCNC: 112 MG/DL — HIGH (ref 70–99)
GLUCOSE SERPL-MCNC: 113 MG/DL — HIGH (ref 70–99)
GLUCOSE SERPL-MCNC: 114 MG/DL — HIGH (ref 70–99)
GLUCOSE SERPL-MCNC: 116 MG/DL — HIGH (ref 70–99)
GLUCOSE SERPL-MCNC: 117 MG/DL — HIGH (ref 70–99)
GLUCOSE SERPL-MCNC: 117 MG/DL — HIGH (ref 70–99)
GLUCOSE SERPL-MCNC: 118 MG/DL — HIGH (ref 70–99)
GLUCOSE SERPL-MCNC: 118 MG/DL — HIGH (ref 70–99)
GLUCOSE SERPL-MCNC: 120 MG/DL — HIGH (ref 70–99)
GLUCOSE SERPL-MCNC: 121 MG/DL — HIGH (ref 70–99)
GLUCOSE SERPL-MCNC: 124 MG/DL — HIGH (ref 70–99)
GLUCOSE SERPL-MCNC: 125 MG/DL — HIGH (ref 70–99)
GLUCOSE SERPL-MCNC: 130 MG/DL — HIGH (ref 70–99)
GLUCOSE SERPL-MCNC: 133 MG/DL — HIGH (ref 70–99)
GLUCOSE SERPL-MCNC: 160 MG/DL — HIGH (ref 70–99)
GLUCOSE SERPL-MCNC: 166 MG/DL — HIGH (ref 70–99)
GLUCOSE SERPL-MCNC: 206 MG/DL — HIGH (ref 70–99)
GLUCOSE SERPL-MCNC: 257 MG/DL — HIGH (ref 70–99)
GLUCOSE SERPL-MCNC: 77 MG/DL — SIGNIFICANT CHANGE UP (ref 70–99)
GLUCOSE SERPL-MCNC: 94 MG/DL — SIGNIFICANT CHANGE UP (ref 70–99)
GLUCOSE UR QL: 100 MG/DL
GLUCOSE UR QL: NEGATIVE MG/DL — SIGNIFICANT CHANGE UP
GLUCOSE UR QL: NEGATIVE MG/DL — SIGNIFICANT CHANGE UP
HADV DNA SPEC QL NAA+PROBE: SIGNIFICANT CHANGE UP
HAPTOGLOB SERPL-MCNC: 163 MG/DL — SIGNIFICANT CHANGE UP (ref 34–200)
HCO3 BLDV-SCNC: 13 MMOL/L — LOW (ref 22–29)
HCO3 BLDV-SCNC: 13 MMOL/L — LOW (ref 22–29)
HCO3 BLDV-SCNC: 14 MMOL/L — LOW (ref 22–29)
HCO3 BLDV-SCNC: 16 MMOL/L — LOW (ref 22–29)
HCO3 BLDV-SCNC: 18 MMOL/L — LOW (ref 22–29)
HCOV 229E RNA SPEC QL NAA+PROBE: SIGNIFICANT CHANGE UP
HCOV HKU1 RNA SPEC QL NAA+PROBE: SIGNIFICANT CHANGE UP
HCOV NL63 RNA SPEC QL NAA+PROBE: SIGNIFICANT CHANGE UP
HCOV OC43 RNA SPEC QL NAA+PROBE: SIGNIFICANT CHANGE UP
HCT VFR BLD CALC: 20 % — CRITICAL LOW (ref 34.5–45)
HCT VFR BLD CALC: 22.6 % — LOW (ref 34.5–45)
HCT VFR BLD CALC: 23.6 % — LOW (ref 34.5–45)
HCT VFR BLD CALC: 23.9 % — LOW (ref 34.5–45)
HCT VFR BLD CALC: 23.9 % — LOW (ref 34.5–45)
HCT VFR BLD CALC: 25.3 % — LOW (ref 34.5–45)
HCT VFR BLD CALC: 25.3 % — LOW (ref 34.5–45)
HCT VFR BLD CALC: 25.4 % — LOW (ref 34.5–45)
HCT VFR BLD CALC: 25.9 % — LOW (ref 34.5–45)
HCT VFR BLD CALC: 26.3 % — LOW (ref 34.5–45)
HCT VFR BLD CALC: 26.4 % — LOW (ref 34.5–45)
HCT VFR BLD CALC: 26.7 % — LOW (ref 34.5–45)
HCT VFR BLD CALC: 27.1 % — LOW (ref 34.5–45)
HCT VFR BLD CALC: 27.5 % — LOW (ref 34.5–45)
HCT VFR BLD CALC: 27.5 % — LOW (ref 34.5–45)
HCT VFR BLD CALC: 27.7 % — LOW (ref 34.5–45)
HCT VFR BLD CALC: 27.8 % — LOW (ref 34.5–45)
HCT VFR BLD CALC: 28 % — LOW (ref 34.5–45)
HCT VFR BLD CALC: 28.1 % — LOW (ref 34.5–45)
HCT VFR BLD CALC: 28.2 % — LOW (ref 34.5–45)
HCT VFR BLD CALC: 30.5 % — LOW (ref 34.5–45)
HCT VFR BLD CALC: 30.7 % — LOW (ref 34.5–45)
HCT VFR BLD CALC: 36.7 % — SIGNIFICANT CHANGE UP (ref 34.5–45)
HCT VFR BLDA CALC: 23 % — LOW (ref 34.5–46.5)
HCT VFR BLDA CALC: 27 % — LOW (ref 34.5–46.5)
HCT VFR BLDA CALC: 28 % — LOW (ref 34.5–46.5)
HDLC SERPL-MCNC: 26 MG/DL — LOW
HDLC SERPL-MCNC: 32 MG/DL — LOW
HGB BLD CALC-MCNC: 7.5 G/DL — LOW (ref 11.7–16.1)
HGB BLD CALC-MCNC: 9.1 G/DL — LOW (ref 11.7–16.1)
HGB BLD CALC-MCNC: 9.3 G/DL — LOW (ref 11.7–16.1)
HGB BLD-MCNC: 10 G/DL — LOW (ref 11.5–15.5)
HGB BLD-MCNC: 10.2 G/DL — LOW (ref 11.5–15.5)
HGB BLD-MCNC: 11.5 G/DL — SIGNIFICANT CHANGE UP (ref 11.5–15.5)
HGB BLD-MCNC: 6.1 G/DL — CRITICAL LOW (ref 11.5–15.5)
HGB BLD-MCNC: 7.1 G/DL — LOW (ref 11.5–15.5)
HGB BLD-MCNC: 7.1 G/DL — LOW (ref 11.5–15.5)
HGB BLD-MCNC: 7.3 G/DL — LOW (ref 11.5–15.5)
HGB BLD-MCNC: 7.5 G/DL — LOW (ref 11.5–15.5)
HGB BLD-MCNC: 7.6 G/DL — LOW (ref 11.5–15.5)
HGB BLD-MCNC: 7.7 G/DL — LOW (ref 11.5–15.5)
HGB BLD-MCNC: 7.8 G/DL — LOW (ref 11.5–15.5)
HGB BLD-MCNC: 7.8 G/DL — LOW (ref 11.5–15.5)
HGB BLD-MCNC: 8 G/DL — LOW (ref 11.5–15.5)
HGB BLD-MCNC: 8.1 G/DL — LOW (ref 11.5–15.5)
HGB BLD-MCNC: 8.2 G/DL — LOW (ref 11.5–15.5)
HGB BLD-MCNC: 8.3 G/DL — LOW (ref 11.5–15.5)
HGB BLD-MCNC: 8.5 G/DL — LOW (ref 11.5–15.5)
HGB BLD-MCNC: 8.6 G/DL — LOW (ref 11.5–15.5)
HGB BLD-MCNC: 9.2 G/DL — LOW (ref 11.5–15.5)
HMPV RNA SPEC QL NAA+PROBE: SIGNIFICANT CHANGE UP
HPIV1 RNA SPEC QL NAA+PROBE: SIGNIFICANT CHANGE UP
HPIV2 RNA SPEC QL NAA+PROBE: SIGNIFICANT CHANGE UP
HPIV3 RNA SPEC QL NAA+PROBE: SIGNIFICANT CHANGE UP
HPIV4 RNA SPEC QL NAA+PROBE: SIGNIFICANT CHANGE UP
HYPOCHROMIA BLD QL: SLIGHT — SIGNIFICANT CHANGE UP
IANC: 10.76 K/UL — HIGH (ref 1.8–7.4)
IANC: 11.5 K/UL — HIGH (ref 1.8–7.4)
IANC: 12.21 K/UL — HIGH (ref 1.8–7.4)
IANC: 12.24 K/UL — HIGH (ref 1.8–7.4)
IANC: 13.67 K/UL — HIGH (ref 1.8–7.4)
IANC: 15.08 K/UL — HIGH (ref 1.8–7.4)
IANC: 15.57 K/UL — HIGH (ref 1.8–7.4)
IANC: 15.94 K/UL — HIGH (ref 1.8–7.4)
IANC: 17.8 K/UL — HIGH (ref 1.8–7.4)
IANC: 20.62 K/UL — HIGH (ref 1.8–7.4)
IANC: 23.6 K/UL — HIGH (ref 1.8–7.4)
IANC: 9.63 K/UL — HIGH (ref 1.8–7.4)
IMM GRANULOCYTES NFR BLD AUTO: 0.3 % — SIGNIFICANT CHANGE UP (ref 0–0.9)
IMM GRANULOCYTES NFR BLD AUTO: 0.3 % — SIGNIFICANT CHANGE UP (ref 0–0.9)
IMM GRANULOCYTES NFR BLD AUTO: 0.4 % — SIGNIFICANT CHANGE UP (ref 0–0.9)
IMM GRANULOCYTES NFR BLD AUTO: 0.4 % — SIGNIFICANT CHANGE UP (ref 0–0.9)
IMM GRANULOCYTES NFR BLD AUTO: 0.5 % — SIGNIFICANT CHANGE UP (ref 0–0.9)
IMM GRANULOCYTES NFR BLD AUTO: 0.6 % — SIGNIFICANT CHANGE UP (ref 0–0.9)
IMM GRANULOCYTES NFR BLD AUTO: 0.7 % — SIGNIFICANT CHANGE UP (ref 0–0.9)
IMM GRANULOCYTES NFR BLD AUTO: 0.9 % — SIGNIFICANT CHANGE UP (ref 0–0.9)
INR BLD: 1.03 RATIO — SIGNIFICANT CHANGE UP (ref 0.85–1.18)
INR BLD: 1.15 RATIO — SIGNIFICANT CHANGE UP (ref 0.85–1.18)
INR BLD: 1.26 RATIO — HIGH (ref 0.85–1.18)
INR BLD: 1.6 RATIO — HIGH (ref 0.85–1.18)
INR BLD: 2.54 RATIO — HIGH (ref 0.85–1.18)
INR BLD: 2.66 RATIO — HIGH (ref 0.85–1.18)
KETONES UR-MCNC: ABNORMAL MG/DL
KETONES UR-MCNC: ABNORMAL MG/DL
KETONES UR-MCNC: NEGATIVE MG/DL — SIGNIFICANT CHANGE UP
LACTATE BLDV-MCNC: 2.8 MMOL/L — HIGH (ref 0.5–2)
LACTATE BLDV-MCNC: 2.8 MMOL/L — HIGH (ref 0.5–2)
LACTATE BLDV-MCNC: 3 MMOL/L — HIGH (ref 0.5–2)
LACTATE BLDV-MCNC: 3.1 MMOL/L — HIGH (ref 0.5–2)
LACTATE SERPL-SCNC: 12.8 MMOL/L — CRITICAL HIGH (ref 0.5–2)
LACTATE SERPL-SCNC: 14.2 MMOL/L — CRITICAL HIGH (ref 0.5–2)
LACTATE SERPL-SCNC: 14.6 MMOL/L — CRITICAL HIGH (ref 0.5–2)
LACTATE SERPL-SCNC: 16.4 MMOL/L — CRITICAL HIGH (ref 0.5–2)
LACTATE SERPL-SCNC: 2.2 MMOL/L — HIGH (ref 0.5–2)
LACTATE SERPL-SCNC: 7.5 MMOL/L — CRITICAL HIGH (ref 0.5–2)
LDH SERPL L TO P-CCNC: 475 U/L — HIGH (ref 135–225)
LDH SERPL L TO P-CCNC: 478 U/L — HIGH (ref 135–225)
LDH SERPL L TO P-CCNC: 507 U/L — HIGH (ref 135–225)
LDH SERPL L TO P-CCNC: 512 U/L — HIGH (ref 135–225)
LDH SERPL L TO P-CCNC: 516 U/L — HIGH (ref 135–225)
LDH SERPL L TO P-CCNC: 533 U/L — HIGH (ref 135–225)
LDH SERPL L TO P-CCNC: 550 U/L — HIGH (ref 135–225)
LDH SERPL L TO P-CCNC: 592 U/L — HIGH (ref 135–225)
LDH SERPL L TO P-CCNC: 684 U/L — HIGH (ref 135–225)
LDH SERPL L TO P-CCNC: 737 U/L — HIGH (ref 135–225)
LEUKOCYTE ESTERASE UR-ACNC: ABNORMAL
LIDOCAIN IGE QN: 23 U/L — SIGNIFICANT CHANGE UP (ref 7–60)
LIDOCAIN IGE QN: 43 U/L — SIGNIFICANT CHANGE UP (ref 7–60)
LIPID PNL WITH DIRECT LDL SERPL: 33 MG/DL — SIGNIFICANT CHANGE UP
LIPID PNL WITH DIRECT LDL SERPL: 47 MG/DL — SIGNIFICANT CHANGE UP
LYMPHOCYTES # BLD AUTO: 0.85 K/UL — LOW (ref 1–3.3)
LYMPHOCYTES # BLD AUTO: 1.31 K/UL — SIGNIFICANT CHANGE UP (ref 1–3.3)
LYMPHOCYTES # BLD AUTO: 1.53 K/UL — SIGNIFICANT CHANGE UP (ref 1–3.3)
LYMPHOCYTES # BLD AUTO: 1.66 K/UL — SIGNIFICANT CHANGE UP (ref 1–3.3)
LYMPHOCYTES # BLD AUTO: 1.79 K/UL — SIGNIFICANT CHANGE UP (ref 1–3.3)
LYMPHOCYTES # BLD AUTO: 1.99 K/UL — SIGNIFICANT CHANGE UP (ref 1–3.3)
LYMPHOCYTES # BLD AUTO: 1.99 K/UL — SIGNIFICANT CHANGE UP (ref 1–3.3)
LYMPHOCYTES # BLD AUTO: 10.5 % — LOW (ref 13–44)
LYMPHOCYTES # BLD AUTO: 11.5 % — LOW (ref 13–44)
LYMPHOCYTES # BLD AUTO: 11.6 % — LOW (ref 13–44)
LYMPHOCYTES # BLD AUTO: 11.8 % — LOW (ref 13–44)
LYMPHOCYTES # BLD AUTO: 12 % — LOW (ref 13–44)
LYMPHOCYTES # BLD AUTO: 12.5 % — LOW (ref 13–44)
LYMPHOCYTES # BLD AUTO: 12.6 % — LOW (ref 13–44)
LYMPHOCYTES # BLD AUTO: 12.8 % — LOW (ref 13–44)
LYMPHOCYTES # BLD AUTO: 14.3 % — SIGNIFICANT CHANGE UP (ref 13–44)
LYMPHOCYTES # BLD AUTO: 2.06 K/UL — SIGNIFICANT CHANGE UP (ref 1–3.3)
LYMPHOCYTES # BLD AUTO: 2.06 K/UL — SIGNIFICANT CHANGE UP (ref 1–3.3)
LYMPHOCYTES # BLD AUTO: 2.18 K/UL — SIGNIFICANT CHANGE UP (ref 1–3.3)
LYMPHOCYTES # BLD AUTO: 2.24 K/UL — SIGNIFICANT CHANGE UP (ref 1–3.3)
LYMPHOCYTES # BLD AUTO: 2.31 K/UL — SIGNIFICANT CHANGE UP (ref 1–3.3)
LYMPHOCYTES # BLD AUTO: 3.4 % — LOW (ref 13–44)
LYMPHOCYTES # BLD AUTO: 6.1 % — LOW (ref 13–44)
LYMPHOCYTES # BLD AUTO: 7.2 % — LOW (ref 13–44)
M PNEUMO DNA SPEC QL NAA+PROBE: SIGNIFICANT CHANGE UP
MACROCYTES BLD QL: SLIGHT — SIGNIFICANT CHANGE UP
MAGNESIUM SERPL-MCNC: 2.1 MG/DL — SIGNIFICANT CHANGE UP (ref 1.6–2.6)
MAGNESIUM SERPL-MCNC: 2.2 MG/DL — SIGNIFICANT CHANGE UP (ref 1.6–2.6)
MAGNESIUM SERPL-MCNC: 2.3 MG/DL — SIGNIFICANT CHANGE UP (ref 1.6–2.6)
MAGNESIUM SERPL-MCNC: 2.4 MG/DL — SIGNIFICANT CHANGE UP (ref 1.6–2.6)
MAGNESIUM SERPL-MCNC: 2.6 MG/DL — SIGNIFICANT CHANGE UP (ref 1.6–2.6)
MAGNESIUM SERPL-MCNC: 2.7 MG/DL — HIGH (ref 1.6–2.6)
MAGNESIUM SERPL-MCNC: 2.7 MG/DL — HIGH (ref 1.6–2.6)
MAGNESIUM SERPL-MCNC: 2.8 MG/DL — HIGH (ref 1.6–2.6)
MAGNESIUM SERPL-MCNC: 2.9 MG/DL — HIGH (ref 1.6–2.6)
MAGNESIUM SERPL-MCNC: 3.1 MG/DL — HIGH (ref 1.6–2.6)
MAGNESIUM SERPL-MCNC: 3.1 MG/DL — HIGH (ref 1.6–2.6)
MAGNESIUM SERPL-MCNC: 3.2 MG/DL — HIGH (ref 1.6–2.6)
MAGNESIUM SERPL-MCNC: 3.3 MG/DL — HIGH (ref 1.6–2.6)
MCHC RBC-ENTMCNC: 27 PG — SIGNIFICANT CHANGE UP (ref 27–34)
MCHC RBC-ENTMCNC: 27 PG — SIGNIFICANT CHANGE UP (ref 27–34)
MCHC RBC-ENTMCNC: 27.2 PG — SIGNIFICANT CHANGE UP (ref 27–34)
MCHC RBC-ENTMCNC: 27.2 PG — SIGNIFICANT CHANGE UP (ref 27–34)
MCHC RBC-ENTMCNC: 27.3 PG — SIGNIFICANT CHANGE UP (ref 27–34)
MCHC RBC-ENTMCNC: 27.3 PG — SIGNIFICANT CHANGE UP (ref 27–34)
MCHC RBC-ENTMCNC: 27.4 PG — SIGNIFICANT CHANGE UP (ref 27–34)
MCHC RBC-ENTMCNC: 27.6 PG — SIGNIFICANT CHANGE UP (ref 27–34)
MCHC RBC-ENTMCNC: 27.7 PG — SIGNIFICANT CHANGE UP (ref 27–34)
MCHC RBC-ENTMCNC: 27.8 PG — SIGNIFICANT CHANGE UP (ref 27–34)
MCHC RBC-ENTMCNC: 27.9 PG — SIGNIFICANT CHANGE UP (ref 27–34)
MCHC RBC-ENTMCNC: 28 PG — SIGNIFICANT CHANGE UP (ref 27–34)
MCHC RBC-ENTMCNC: 28 PG — SIGNIFICANT CHANGE UP (ref 27–34)
MCHC RBC-ENTMCNC: 28.1 PG — SIGNIFICANT CHANGE UP (ref 27–34)
MCHC RBC-ENTMCNC: 28.8 PG — SIGNIFICANT CHANGE UP (ref 27–34)
MCHC RBC-ENTMCNC: 29.3 GM/DL — LOW (ref 32–36)
MCHC RBC-ENTMCNC: 29.5 GM/DL — LOW (ref 32–36)
MCHC RBC-ENTMCNC: 29.6 GM/DL — LOW (ref 32–36)
MCHC RBC-ENTMCNC: 29.8 GM/DL — LOW (ref 32–36)
MCHC RBC-ENTMCNC: 29.9 GM/DL — LOW (ref 32–36)
MCHC RBC-ENTMCNC: 30 GM/DL — LOW (ref 32–36)
MCHC RBC-ENTMCNC: 30 PG — SIGNIFICANT CHANGE UP (ref 27–34)
MCHC RBC-ENTMCNC: 30.1 GM/DL — LOW (ref 32–36)
MCHC RBC-ENTMCNC: 30.3 GM/DL — LOW (ref 32–36)
MCHC RBC-ENTMCNC: 30.5 GM/DL — LOW (ref 32–36)
MCHC RBC-ENTMCNC: 30.5 GM/DL — LOW (ref 32–36)
MCHC RBC-ENTMCNC: 30.8 GM/DL — LOW (ref 32–36)
MCHC RBC-ENTMCNC: 30.8 PG — SIGNIFICANT CHANGE UP (ref 27–34)
MCHC RBC-ENTMCNC: 31 GM/DL — LOW (ref 32–36)
MCHC RBC-ENTMCNC: 31.3 GM/DL — LOW (ref 32–36)
MCHC RBC-ENTMCNC: 31.3 GM/DL — LOW (ref 32–36)
MCHC RBC-ENTMCNC: 31.4 GM/DL — LOW (ref 32–36)
MCHC RBC-ENTMCNC: 31.7 GM/DL — LOW (ref 32–36)
MCHC RBC-ENTMCNC: 31.8 GM/DL — LOW (ref 32–36)
MCHC RBC-ENTMCNC: 31.9 GM/DL — LOW (ref 32–36)
MCHC RBC-ENTMCNC: 32.6 GM/DL — SIGNIFICANT CHANGE UP (ref 32–36)
MCHC RBC-ENTMCNC: 33.2 GM/DL — SIGNIFICANT CHANGE UP (ref 32–36)
MCHC RBC-ENTMCNC: 33.4 GM/DL — SIGNIFICANT CHANGE UP (ref 32–36)
MCV RBC AUTO: 84.1 FL — SIGNIFICANT CHANGE UP (ref 80–100)
MCV RBC AUTO: 85.2 FL — SIGNIFICANT CHANGE UP (ref 80–100)
MCV RBC AUTO: 85.2 FL — SIGNIFICANT CHANGE UP (ref 80–100)
MCV RBC AUTO: 86.2 FL — SIGNIFICANT CHANGE UP (ref 80–100)
MCV RBC AUTO: 86.6 FL — SIGNIFICANT CHANGE UP (ref 80–100)
MCV RBC AUTO: 86.7 FL — SIGNIFICANT CHANGE UP (ref 80–100)
MCV RBC AUTO: 89.4 FL — SIGNIFICANT CHANGE UP (ref 80–100)
MCV RBC AUTO: 89.5 FL — SIGNIFICANT CHANGE UP (ref 80–100)
MCV RBC AUTO: 89.5 FL — SIGNIFICANT CHANGE UP (ref 80–100)
MCV RBC AUTO: 89.7 FL — SIGNIFICANT CHANGE UP (ref 80–100)
MCV RBC AUTO: 89.7 FL — SIGNIFICANT CHANGE UP (ref 80–100)
MCV RBC AUTO: 91.5 FL — SIGNIFICANT CHANGE UP (ref 80–100)
MCV RBC AUTO: 91.7 FL — SIGNIFICANT CHANGE UP (ref 80–100)
MCV RBC AUTO: 91.7 FL — SIGNIFICANT CHANGE UP (ref 80–100)
MCV RBC AUTO: 91.9 FL — SIGNIFICANT CHANGE UP (ref 80–100)
MCV RBC AUTO: 92 FL — SIGNIFICANT CHANGE UP (ref 80–100)
MCV RBC AUTO: 92.2 FL — SIGNIFICANT CHANGE UP (ref 80–100)
MCV RBC AUTO: 92.6 FL — SIGNIFICANT CHANGE UP (ref 80–100)
MCV RBC AUTO: 92.9 FL — SIGNIFICANT CHANGE UP (ref 80–100)
MCV RBC AUTO: 93 FL — SIGNIFICANT CHANGE UP (ref 80–100)
MCV RBC AUTO: 93.4 FL — SIGNIFICANT CHANGE UP (ref 80–100)
MCV RBC AUTO: 93.6 FL — SIGNIFICANT CHANGE UP (ref 80–100)
MCV RBC AUTO: 95.8 FL — SIGNIFICANT CHANGE UP (ref 80–100)
MONOCYTES # BLD AUTO: 1.01 K/UL — HIGH (ref 0–0.9)
MONOCYTES # BLD AUTO: 1.09 K/UL — HIGH (ref 0–0.9)
MONOCYTES # BLD AUTO: 1.18 K/UL — HIGH (ref 0–0.9)
MONOCYTES # BLD AUTO: 1.26 K/UL — HIGH (ref 0–0.9)
MONOCYTES # BLD AUTO: 1.3 K/UL — HIGH (ref 0–0.9)
MONOCYTES # BLD AUTO: 1.31 K/UL — HIGH (ref 0–0.9)
MONOCYTES # BLD AUTO: 1.32 K/UL — HIGH (ref 0–0.9)
MONOCYTES # BLD AUTO: 1.32 K/UL — HIGH (ref 0–0.9)
MONOCYTES # BLD AUTO: 1.36 K/UL — HIGH (ref 0–0.9)
MONOCYTES # BLD AUTO: 1.41 K/UL — HIGH (ref 0–0.9)
MONOCYTES # BLD AUTO: 1.64 K/UL — HIGH (ref 0–0.9)
MONOCYTES # BLD AUTO: 1.94 K/UL — HIGH (ref 0–0.9)
MONOCYTES NFR BLD AUTO: 6 % — SIGNIFICANT CHANGE UP (ref 2–14)
MONOCYTES NFR BLD AUTO: 6.1 % — SIGNIFICANT CHANGE UP (ref 2–14)
MONOCYTES NFR BLD AUTO: 6.7 % — SIGNIFICANT CHANGE UP (ref 2–14)
MONOCYTES NFR BLD AUTO: 6.9 % — SIGNIFICANT CHANGE UP (ref 2–14)
MONOCYTES NFR BLD AUTO: 6.9 % — SIGNIFICANT CHANGE UP (ref 2–14)
MONOCYTES NFR BLD AUTO: 7.2 % — SIGNIFICANT CHANGE UP (ref 2–14)
MONOCYTES NFR BLD AUTO: 7.7 % — SIGNIFICANT CHANGE UP (ref 2–14)
MONOCYTES NFR BLD AUTO: 7.8 % — SIGNIFICANT CHANGE UP (ref 2–14)
MONOCYTES NFR BLD AUTO: 8.1 % — SIGNIFICANT CHANGE UP (ref 2–14)
MONOCYTES NFR BLD AUTO: 8.2 % — SIGNIFICANT CHANGE UP (ref 2–14)
MONOCYTES NFR BLD AUTO: 8.6 % — SIGNIFICANT CHANGE UP (ref 2–14)
MONOCYTES NFR BLD AUTO: 9 % — SIGNIFICANT CHANGE UP (ref 2–14)
MRSA PCR RESULT.: SIGNIFICANT CHANGE UP
NEUTROPHILS # BLD AUTO: 10.76 K/UL — HIGH (ref 1.8–7.4)
NEUTROPHILS # BLD AUTO: 11.5 K/UL — HIGH (ref 1.8–7.4)
NEUTROPHILS # BLD AUTO: 12.21 K/UL — HIGH (ref 1.8–7.4)
NEUTROPHILS # BLD AUTO: 12.24 K/UL — HIGH (ref 1.8–7.4)
NEUTROPHILS # BLD AUTO: 13.67 K/UL — HIGH (ref 1.8–7.4)
NEUTROPHILS # BLD AUTO: 15.08 K/UL — HIGH (ref 1.8–7.4)
NEUTROPHILS # BLD AUTO: 15.57 K/UL — HIGH (ref 1.8–7.4)
NEUTROPHILS # BLD AUTO: 15.94 K/UL — HIGH (ref 1.8–7.4)
NEUTROPHILS # BLD AUTO: 18.92 K/UL — HIGH (ref 1.8–7.4)
NEUTROPHILS # BLD AUTO: 22.08 K/UL — HIGH (ref 1.8–7.4)
NEUTROPHILS # BLD AUTO: 23.6 K/UL — HIGH (ref 1.8–7.4)
NEUTROPHILS # BLD AUTO: 9.63 K/UL — HIGH (ref 1.8–7.4)
NEUTROPHILS NFR BLD AUTO: 76.8 % — SIGNIFICANT CHANGE UP (ref 43–77)
NEUTROPHILS NFR BLD AUTO: 77.7 % — HIGH (ref 43–77)
NEUTROPHILS NFR BLD AUTO: 77.9 % — HIGH (ref 43–77)
NEUTROPHILS NFR BLD AUTO: 78.6 % — HIGH (ref 43–77)
NEUTROPHILS NFR BLD AUTO: 79.4 % — HIGH (ref 43–77)
NEUTROPHILS NFR BLD AUTO: 79.4 % — HIGH (ref 43–77)
NEUTROPHILS NFR BLD AUTO: 79.5 % — HIGH (ref 43–77)
NEUTROPHILS NFR BLD AUTO: 80.2 % — HIGH (ref 43–77)
NEUTROPHILS NFR BLD AUTO: 81.4 % — HIGH (ref 43–77)
NEUTROPHILS NFR BLD AUTO: 85.8 % — HIGH (ref 43–77)
NEUTROPHILS NFR BLD AUTO: 87.8 % — HIGH (ref 43–77)
NEUTROPHILS NFR BLD AUTO: 88.8 % — HIGH (ref 43–77)
NITRITE UR-MCNC: NEGATIVE — SIGNIFICANT CHANGE UP
NON HDL CHOLESTEROL: 110 MG/DL — SIGNIFICANT CHANGE UP
NON HDL CHOLESTEROL: 76 MG/DL — SIGNIFICANT CHANGE UP
NRBC # BLD: 0 /100 WBCS — SIGNIFICANT CHANGE UP (ref 0–0)
NRBC # BLD: 1 /100 WBCS — HIGH (ref 0–0)
NRBC # BLD: 1 /100 WBCS — HIGH (ref 0–0)
NRBC # BLD: 12 /100 WBCS — HIGH (ref 0–0)
NRBC # BLD: 2 /100 WBCS — HIGH (ref 0–0)
NRBC # BLD: 5 /100 WBCS — HIGH (ref 0–0)
NRBC # FLD: 0 K/UL — SIGNIFICANT CHANGE UP (ref 0–0)
NRBC # FLD: 0.06 K/UL — HIGH (ref 0–0)
NRBC # FLD: 0.09 K/UL — HIGH (ref 0–0)
NRBC # FLD: 0.13 K/UL — HIGH (ref 0–0)
NRBC # FLD: 0.31 K/UL — HIGH (ref 0–0)
NRBC # FLD: 0.34 K/UL — HIGH (ref 0–0)
NRBC # FLD: 0.49 K/UL — HIGH (ref 0–0)
NRBC # FLD: 0.66 K/UL — HIGH (ref 0–0)
NRBC # FLD: 1.49 K/UL — HIGH (ref 0–0)
NRBC # FLD: 3.63 K/UL — HIGH (ref 0–0)
NT-PROBNP SERPL-SCNC: 2868 PG/ML — HIGH
OSMOLALITY UR: 422 MOSM/KG — SIGNIFICANT CHANGE UP (ref 50–1200)
PCO2 BLDV: 31 MMHG — LOW (ref 39–52)
PCO2 BLDV: 33 MMHG — LOW (ref 39–52)
PCO2 BLDV: 35 MMHG — LOW (ref 39–52)
PCO2 BLDV: 39 MMHG — SIGNIFICANT CHANGE UP (ref 39–52)
PCO2 BLDV: 42 MMHG — SIGNIFICANT CHANGE UP (ref 39–52)
PH BLDV: 7.11 — LOW (ref 7.32–7.43)
PH BLDV: 7.2 — LOW (ref 7.32–7.43)
PH BLDV: 7.26 — LOW (ref 7.32–7.43)
PH BLDV: 7.27 — LOW (ref 7.32–7.43)
PH BLDV: 7.28 — LOW (ref 7.32–7.43)
PH UR: 5 — SIGNIFICANT CHANGE UP (ref 5–8)
PH UR: 5 — SIGNIFICANT CHANGE UP (ref 5–8)
PH UR: 5.5 — SIGNIFICANT CHANGE UP (ref 5–8)
PHOSPHATE SERPL-MCNC: 3.3 MG/DL — SIGNIFICANT CHANGE UP (ref 2.5–4.5)
PHOSPHATE SERPL-MCNC: 3.5 MG/DL — SIGNIFICANT CHANGE UP (ref 2.5–4.5)
PHOSPHATE SERPL-MCNC: 3.5 MG/DL — SIGNIFICANT CHANGE UP (ref 2.5–4.5)
PHOSPHATE SERPL-MCNC: 3.8 MG/DL — SIGNIFICANT CHANGE UP (ref 2.5–4.5)
PHOSPHATE SERPL-MCNC: 3.8 MG/DL — SIGNIFICANT CHANGE UP (ref 2.5–4.5)
PHOSPHATE SERPL-MCNC: 4 MG/DL — SIGNIFICANT CHANGE UP (ref 2.5–4.5)
PHOSPHATE SERPL-MCNC: 4.5 MG/DL — SIGNIFICANT CHANGE UP (ref 2.5–4.5)
PHOSPHATE SERPL-MCNC: 4.6 MG/DL — HIGH (ref 2.5–4.5)
PHOSPHATE SERPL-MCNC: 4.8 MG/DL — HIGH (ref 2.5–4.5)
PHOSPHATE SERPL-MCNC: 4.8 MG/DL — HIGH (ref 2.5–4.5)
PHOSPHATE SERPL-MCNC: 5.1 MG/DL — HIGH (ref 2.5–4.5)
PHOSPHATE SERPL-MCNC: 5.2 MG/DL — HIGH (ref 2.5–4.5)
PHOSPHATE SERPL-MCNC: 5.3 MG/DL — HIGH (ref 2.5–4.5)
PHOSPHATE SERPL-MCNC: 5.4 MG/DL — HIGH (ref 2.5–4.5)
PHOSPHATE SERPL-MCNC: 5.5 MG/DL — HIGH (ref 2.5–4.5)
PHOSPHATE SERPL-MCNC: 6 MG/DL — HIGH (ref 2.5–4.5)
PHOSPHATE SERPL-MCNC: 6.1 MG/DL — HIGH (ref 2.5–4.5)
PHOSPHATE SERPL-MCNC: 6.3 MG/DL — HIGH (ref 2.5–4.5)
PHOSPHATE SERPL-MCNC: 7 MG/DL — HIGH (ref 2.5–4.5)
PHOSPHATE SERPL-MCNC: 7.1 MG/DL — HIGH (ref 2.5–4.5)
PHOSPHATE SERPL-MCNC: 8.9 MG/DL — HIGH (ref 2.5–4.5)
PLAT MORPH BLD: NORMAL — SIGNIFICANT CHANGE UP
PLATELET # BLD AUTO: 108 K/UL — LOW (ref 150–400)
PLATELET # BLD AUTO: 142 K/UL — LOW (ref 150–400)
PLATELET # BLD AUTO: 155 K/UL — SIGNIFICANT CHANGE UP (ref 150–400)
PLATELET # BLD AUTO: 157 K/UL — SIGNIFICANT CHANGE UP (ref 150–400)
PLATELET # BLD AUTO: 158 K/UL — SIGNIFICANT CHANGE UP (ref 150–400)
PLATELET # BLD AUTO: 166 K/UL — SIGNIFICANT CHANGE UP (ref 150–400)
PLATELET # BLD AUTO: 168 K/UL — SIGNIFICANT CHANGE UP (ref 150–400)
PLATELET # BLD AUTO: 168 K/UL — SIGNIFICANT CHANGE UP (ref 150–400)
PLATELET # BLD AUTO: 171 K/UL — SIGNIFICANT CHANGE UP (ref 150–400)
PLATELET # BLD AUTO: 176 K/UL — SIGNIFICANT CHANGE UP (ref 150–400)
PLATELET # BLD AUTO: 180 K/UL — SIGNIFICANT CHANGE UP (ref 150–400)
PLATELET # BLD AUTO: 211 K/UL — SIGNIFICANT CHANGE UP (ref 150–400)
PLATELET # BLD AUTO: 234 K/UL — SIGNIFICANT CHANGE UP (ref 150–400)
PLATELET # BLD AUTO: 246 K/UL — SIGNIFICANT CHANGE UP (ref 150–400)
PLATELET # BLD AUTO: 275 K/UL — SIGNIFICANT CHANGE UP (ref 150–400)
PLATELET # BLD AUTO: 305 K/UL — SIGNIFICANT CHANGE UP (ref 150–400)
PLATELET # BLD AUTO: 313 K/UL — SIGNIFICANT CHANGE UP (ref 150–400)
PLATELET # BLD AUTO: 325 K/UL — SIGNIFICANT CHANGE UP (ref 150–400)
PLATELET # BLD AUTO: 327 K/UL — SIGNIFICANT CHANGE UP (ref 150–400)
PLATELET # BLD AUTO: 333 K/UL — SIGNIFICANT CHANGE UP (ref 150–400)
PLATELET # BLD AUTO: 352 K/UL — SIGNIFICANT CHANGE UP (ref 150–400)
PLATELET # BLD AUTO: 76 K/UL — LOW (ref 150–400)
PLATELET # BLD AUTO: 91 K/UL — LOW (ref 150–400)
PLATELET COUNT - ESTIMATE: NORMAL — SIGNIFICANT CHANGE UP
PO2 BLDV: 26 MMHG — SIGNIFICANT CHANGE UP (ref 25–45)
PO2 BLDV: 28 MMHG — SIGNIFICANT CHANGE UP (ref 25–45)
PO2 BLDV: 45 MMHG — SIGNIFICANT CHANGE UP (ref 25–45)
PO2 BLDV: 56 MMHG — HIGH (ref 25–45)
PO2 BLDV: 70 MMHG — HIGH (ref 25–45)
POIKILOCYTOSIS BLD QL AUTO: SLIGHT — SIGNIFICANT CHANGE UP
POLYCHROMASIA BLD QL SMEAR: SIGNIFICANT CHANGE UP
POTASSIUM BLDV-SCNC: 5.8 MMOL/L — HIGH (ref 3.5–5.1)
POTASSIUM BLDV-SCNC: 5.9 MMOL/L — HIGH (ref 3.5–5.1)
POTASSIUM BLDV-SCNC: 6.3 MMOL/L — CRITICAL HIGH (ref 3.5–5.1)
POTASSIUM SERPL-MCNC: 4 MMOL/L — SIGNIFICANT CHANGE UP (ref 3.5–5.3)
POTASSIUM SERPL-MCNC: 4.1 MMOL/L — SIGNIFICANT CHANGE UP (ref 3.5–5.3)
POTASSIUM SERPL-MCNC: 4.3 MMOL/L — SIGNIFICANT CHANGE UP (ref 3.5–5.3)
POTASSIUM SERPL-MCNC: 4.4 MMOL/L — SIGNIFICANT CHANGE UP (ref 3.5–5.3)
POTASSIUM SERPL-MCNC: 4.5 MMOL/L — SIGNIFICANT CHANGE UP (ref 3.5–5.3)
POTASSIUM SERPL-MCNC: 4.7 MMOL/L — SIGNIFICANT CHANGE UP (ref 3.5–5.3)
POTASSIUM SERPL-MCNC: 4.9 MMOL/L — SIGNIFICANT CHANGE UP (ref 3.5–5.3)
POTASSIUM SERPL-MCNC: 5 MMOL/L — SIGNIFICANT CHANGE UP (ref 3.5–5.3)
POTASSIUM SERPL-MCNC: 5.1 MMOL/L — SIGNIFICANT CHANGE UP (ref 3.5–5.3)
POTASSIUM SERPL-MCNC: 5.2 MMOL/L — SIGNIFICANT CHANGE UP (ref 3.5–5.3)
POTASSIUM SERPL-MCNC: 5.2 MMOL/L — SIGNIFICANT CHANGE UP (ref 3.5–5.3)
POTASSIUM SERPL-MCNC: 5.5 MMOL/L — HIGH (ref 3.5–5.3)
POTASSIUM SERPL-MCNC: 5.5 MMOL/L — HIGH (ref 3.5–5.3)
POTASSIUM SERPL-MCNC: 5.7 MMOL/L — HIGH (ref 3.5–5.3)
POTASSIUM SERPL-MCNC: 6.1 MMOL/L — HIGH (ref 3.5–5.3)
POTASSIUM SERPL-MCNC: 6.9 MMOL/L — CRITICAL HIGH (ref 3.5–5.3)
POTASSIUM SERPL-MCNC: SIGNIFICANT CHANGE UP MMOL/L (ref 3.5–5.3)
POTASSIUM SERPL-SCNC: 4 MMOL/L — SIGNIFICANT CHANGE UP (ref 3.5–5.3)
POTASSIUM SERPL-SCNC: 4.1 MMOL/L — SIGNIFICANT CHANGE UP (ref 3.5–5.3)
POTASSIUM SERPL-SCNC: 4.3 MMOL/L — SIGNIFICANT CHANGE UP (ref 3.5–5.3)
POTASSIUM SERPL-SCNC: 4.4 MMOL/L — SIGNIFICANT CHANGE UP (ref 3.5–5.3)
POTASSIUM SERPL-SCNC: 4.5 MMOL/L — SIGNIFICANT CHANGE UP (ref 3.5–5.3)
POTASSIUM SERPL-SCNC: 4.7 MMOL/L — SIGNIFICANT CHANGE UP (ref 3.5–5.3)
POTASSIUM SERPL-SCNC: 4.9 MMOL/L — SIGNIFICANT CHANGE UP (ref 3.5–5.3)
POTASSIUM SERPL-SCNC: 5 MMOL/L — SIGNIFICANT CHANGE UP (ref 3.5–5.3)
POTASSIUM SERPL-SCNC: 5.1 MMOL/L — SIGNIFICANT CHANGE UP (ref 3.5–5.3)
POTASSIUM SERPL-SCNC: 5.2 MMOL/L — SIGNIFICANT CHANGE UP (ref 3.5–5.3)
POTASSIUM SERPL-SCNC: 5.2 MMOL/L — SIGNIFICANT CHANGE UP (ref 3.5–5.3)
POTASSIUM SERPL-SCNC: 5.5 MMOL/L — HIGH (ref 3.5–5.3)
POTASSIUM SERPL-SCNC: 5.5 MMOL/L — HIGH (ref 3.5–5.3)
POTASSIUM SERPL-SCNC: 5.7 MMOL/L — HIGH (ref 3.5–5.3)
POTASSIUM SERPL-SCNC: 6.1 MMOL/L — HIGH (ref 3.5–5.3)
POTASSIUM SERPL-SCNC: 6.9 MMOL/L — CRITICAL HIGH (ref 3.5–5.3)
POTASSIUM SERPL-SCNC: SIGNIFICANT CHANGE UP MMOL/L (ref 3.5–5.3)
POTASSIUM UR-SCNC: 46.8 MMOL/L — SIGNIFICANT CHANGE UP
PROT ?TM UR-MCNC: 50 MG/DL — SIGNIFICANT CHANGE UP
PROT SERPL-MCNC: 5.1 G/DL — LOW (ref 6–8.3)
PROT SERPL-MCNC: 5.4 G/DL — LOW (ref 6–8.3)
PROT SERPL-MCNC: 5.6 G/DL — LOW (ref 6–8.3)
PROT SERPL-MCNC: 5.6 G/DL — LOW (ref 6–8.3)
PROT SERPL-MCNC: 5.7 G/DL — LOW (ref 6–8.3)
PROT SERPL-MCNC: 5.8 G/DL — LOW (ref 6–8.3)
PROT SERPL-MCNC: 5.8 G/DL — LOW (ref 6–8.3)
PROT SERPL-MCNC: 5.9 G/DL — LOW (ref 6–8.3)
PROT SERPL-MCNC: 6 G/DL — SIGNIFICANT CHANGE UP (ref 6–8.3)
PROT SERPL-MCNC: 6.1 G/DL — SIGNIFICANT CHANGE UP (ref 6–8.3)
PROT SERPL-MCNC: 6.2 G/DL — SIGNIFICANT CHANGE UP (ref 6–8.3)
PROT SERPL-MCNC: 6.3 G/DL — SIGNIFICANT CHANGE UP (ref 6–8.3)
PROT SERPL-MCNC: 6.3 G/DL — SIGNIFICANT CHANGE UP (ref 6–8.3)
PROT SERPL-MCNC: 6.4 G/DL — SIGNIFICANT CHANGE UP (ref 6–8.3)
PROT SERPL-MCNC: 6.5 G/DL — SIGNIFICANT CHANGE UP (ref 6–8.3)
PROT SERPL-MCNC: 6.5 G/DL — SIGNIFICANT CHANGE UP (ref 6–8.3)
PROT SERPL-MCNC: 6.7 G/DL — SIGNIFICANT CHANGE UP (ref 6–8.3)
PROT SERPL-MCNC: 7 G/DL — SIGNIFICANT CHANGE UP (ref 6–8.3)
PROT SERPL-MCNC: 7.3 G/DL — SIGNIFICANT CHANGE UP (ref 6–8.3)
PROT SERPL-MCNC: 7.8 G/DL — SIGNIFICANT CHANGE UP (ref 6–8.3)
PROT UR-MCNC: 100 MG/DL
PROT UR-MCNC: 100 MG/DL
PROT UR-MCNC: 30 MG/DL
PROT/CREAT UR-RTO: 0.2 RATIO — SIGNIFICANT CHANGE UP (ref 0–0.2)
PROTHROM AB SERPL-ACNC: 11.6 SEC — SIGNIFICANT CHANGE UP (ref 9.5–13)
PROTHROM AB SERPL-ACNC: 12.8 SEC — SIGNIFICANT CHANGE UP (ref 9.5–13)
PROTHROM AB SERPL-ACNC: 14.1 SEC — HIGH (ref 9.5–13)
PROTHROM AB SERPL-ACNC: 17.7 SEC — HIGH (ref 9.5–13)
PROTHROM AB SERPL-ACNC: 27.9 SEC — HIGH (ref 9.5–13)
PROTHROM AB SERPL-ACNC: 29.2 SEC — HIGH (ref 9.5–13)
RAPID RVP RESULT: SIGNIFICANT CHANGE UP
RBC # BLD: 2.11 M/UL — LOW (ref 3.8–5.2)
RBC # BLD: 2.52 M/UL — LOW (ref 3.8–5.2)
RBC # BLD: 2.56 M/UL — LOW (ref 3.8–5.2)
RBC # BLD: 2.6 M/UL — LOW (ref 3.8–5.2)
RBC # BLD: 2.64 M/UL — LOW (ref 3.8–5.2)
RBC # BLD: 2.76 M/UL — LOW (ref 3.8–5.2)
RBC # BLD: 2.81 M/UL — LOW (ref 3.8–5.2)
RBC # BLD: 2.83 M/UL — LOW (ref 3.8–5.2)
RBC # BLD: 2.83 M/UL — LOW (ref 3.8–5.2)
RBC # BLD: 2.87 M/UL — LOW (ref 3.8–5.2)
RBC # BLD: 2.98 M/UL — LOW (ref 3.8–5.2)
RBC # BLD: 2.99 M/UL — LOW (ref 3.8–5.2)
RBC # BLD: 3 M/UL — LOW (ref 3.8–5.2)
RBC # BLD: 3.01 M/UL — LOW (ref 3.8–5.2)
RBC # BLD: 3.01 M/UL — LOW (ref 3.8–5.2)
RBC # BLD: 3.03 M/UL — LOW (ref 3.8–5.2)
RBC # BLD: 3.08 M/UL — LOW (ref 3.8–5.2)
RBC # BLD: 3.15 M/UL — LOW (ref 3.8–5.2)
RBC # BLD: 3.18 M/UL — LOW (ref 3.8–5.2)
RBC # BLD: 3.19 M/UL — LOW (ref 3.8–5.2)
RBC # BLD: 3.4 M/UL — LOW (ref 3.8–5.2)
RBC # BLD: 3.65 M/UL — LOW (ref 3.8–5.2)
RBC # BLD: 4.1 M/UL — SIGNIFICANT CHANGE UP (ref 3.8–5.2)
RBC # FLD: 13 % — SIGNIFICANT CHANGE UP (ref 10.3–14.5)
RBC # FLD: 17.2 % — HIGH (ref 10.3–14.5)
RBC # FLD: 17.3 % — HIGH (ref 10.3–14.5)
RBC # FLD: 17.4 % — HIGH (ref 10.3–14.5)
RBC # FLD: 17.5 % — HIGH (ref 10.3–14.5)
RBC # FLD: 17.6 % — HIGH (ref 10.3–14.5)
RBC # FLD: 17.7 % — HIGH (ref 10.3–14.5)
RBC # FLD: 17.8 % — HIGH (ref 10.3–14.5)
RBC # FLD: 17.8 % — HIGH (ref 10.3–14.5)
RBC # FLD: 18 % — HIGH (ref 10.3–14.5)
RBC # FLD: 18.2 % — HIGH (ref 10.3–14.5)
RBC # FLD: 18.7 % — HIGH (ref 10.3–14.5)
RBC # FLD: 19.1 % — HIGH (ref 10.3–14.5)
RBC # FLD: 19.1 % — HIGH (ref 10.3–14.5)
RBC # FLD: 19.3 % — HIGH (ref 10.3–14.5)
RBC # FLD: 19.4 % — HIGH (ref 10.3–14.5)
RBC # FLD: 19.7 % — HIGH (ref 10.3–14.5)
RBC # FLD: 19.7 % — HIGH (ref 10.3–14.5)
RBC # FLD: 19.8 % — HIGH (ref 10.3–14.5)
RBC # FLD: 19.8 % — HIGH (ref 10.3–14.5)
RBC # FLD: 20.2 % — HIGH (ref 10.3–14.5)
RBC BLD AUTO: ABNORMAL
RBC CASTS # UR COMP ASSIST: 3 /HPF — SIGNIFICANT CHANGE UP (ref 0–4)
RBC CASTS # UR COMP ASSIST: 38 /HPF — HIGH (ref 0–4)
RBC CASTS # UR COMP ASSIST: SIGNIFICANT CHANGE UP /HPF (ref 0–4)
REVIEW: SIGNIFICANT CHANGE UP
REVIEW: SIGNIFICANT CHANGE UP
RH IG SCN BLD-IMP: POSITIVE — SIGNIFICANT CHANGE UP
RSV RNA SPEC QL NAA+PROBE: SIGNIFICANT CHANGE UP
RV+EV RNA SPEC QL NAA+PROBE: SIGNIFICANT CHANGE UP
S AUREUS DNA NOSE QL NAA+PROBE: DETECTED
SAO2 % BLDV: 22.6 % — LOW (ref 67–88)
SAO2 % BLDV: 34.6 % — LOW (ref 67–88)
SAO2 % BLDV: 67.9 % — SIGNIFICANT CHANGE UP (ref 67–88)
SAO2 % BLDV: 79.3 % — SIGNIFICANT CHANGE UP (ref 67–88)
SAO2 % BLDV: 91.3 % — HIGH (ref 67–88)
SARS-COV-2 RNA SPEC QL NAA+PROBE: SIGNIFICANT CHANGE UP
SODIUM SERPL-SCNC: 129 MMOL/L — LOW (ref 135–145)
SODIUM SERPL-SCNC: 129 MMOL/L — LOW (ref 135–145)
SODIUM SERPL-SCNC: 130 MMOL/L — LOW (ref 135–145)
SODIUM SERPL-SCNC: 131 MMOL/L — LOW (ref 135–145)
SODIUM SERPL-SCNC: 132 MMOL/L — LOW (ref 135–145)
SODIUM SERPL-SCNC: 134 MMOL/L — LOW (ref 135–145)
SODIUM SERPL-SCNC: 134 MMOL/L — LOW (ref 135–145)
SODIUM SERPL-SCNC: 136 MMOL/L — SIGNIFICANT CHANGE UP (ref 135–145)
SODIUM SERPL-SCNC: 137 MMOL/L — SIGNIFICANT CHANGE UP (ref 135–145)
SODIUM SERPL-SCNC: 137 MMOL/L — SIGNIFICANT CHANGE UP (ref 135–145)
SODIUM SERPL-SCNC: 138 MMOL/L — SIGNIFICANT CHANGE UP (ref 135–145)
SODIUM SERPL-SCNC: 139 MMOL/L — SIGNIFICANT CHANGE UP (ref 135–145)
SODIUM SERPL-SCNC: 140 MMOL/L — SIGNIFICANT CHANGE UP (ref 135–145)
SODIUM SERPL-SCNC: 140 MMOL/L — SIGNIFICANT CHANGE UP (ref 135–145)
SODIUM SERPL-SCNC: 141 MMOL/L — SIGNIFICANT CHANGE UP (ref 135–145)
SODIUM SERPL-SCNC: 142 MMOL/L — SIGNIFICANT CHANGE UP (ref 135–145)
SODIUM SERPL-SCNC: 143 MMOL/L — SIGNIFICANT CHANGE UP (ref 135–145)
SODIUM SERPL-SCNC: 143 MMOL/L — SIGNIFICANT CHANGE UP (ref 135–145)
SODIUM SERPL-SCNC: SIGNIFICANT CHANGE UP MMOL/L (ref 135–145)
SODIUM UR-SCNC: 22 MMOL/L — SIGNIFICANT CHANGE UP
SP GR SPEC: 1.02 — SIGNIFICANT CHANGE UP (ref 1–1.03)
SP GR SPEC: 1.02 — SIGNIFICANT CHANGE UP (ref 1–1.03)
SP GR SPEC: 1.03 — SIGNIFICANT CHANGE UP (ref 1–1.03)
SPECIMEN SOURCE: SIGNIFICANT CHANGE UP
SQUAMOUS # UR AUTO: 10 /HPF — HIGH (ref 0–5)
SQUAMOUS # UR AUTO: 5 /HPF — SIGNIFICANT CHANGE UP (ref 0–5)
SURGICAL PATHOLOGY STUDY: SIGNIFICANT CHANGE UP
TRIGL SERPL-MCNC: 145 MG/DL — SIGNIFICANT CHANGE UP
TRIGL SERPL-MCNC: 384 MG/DL — HIGH
TROPONIN T, HIGH SENSITIVITY RESULT: 47 NG/L — SIGNIFICANT CHANGE UP
URATE SERPL-MCNC: 0.2 MG/DL — LOW (ref 2.5–7)
URATE SERPL-MCNC: 0.7 MG/DL — LOW (ref 2.5–7)
URATE SERPL-MCNC: 1.2 MG/DL — LOW (ref 2.5–7)
URATE SERPL-MCNC: 1.8 MG/DL — LOW (ref 2.5–7)
URATE SERPL-MCNC: 14.5 MG/DL — HIGH (ref 2.5–7)
URATE SERPL-MCNC: 2.2 MG/DL — LOW (ref 2.5–7)
URATE SERPL-MCNC: 22.2 MG/DL — HIGH (ref 2.5–7)
URATE SERPL-MCNC: 5.6 MG/DL — SIGNIFICANT CHANGE UP (ref 2.5–7)
URATE SERPL-MCNC: 5.9 MG/DL — SIGNIFICANT CHANGE UP (ref 2.5–7)
URATE SERPL-MCNC: <0.2 MG/DL — LOW (ref 2.5–7)
UROBILINOGEN FLD QL: 1 MG/DL — SIGNIFICANT CHANGE UP (ref 0.2–1)
UUN UR-MCNC: 600.8 MG/DL — SIGNIFICANT CHANGE UP
WBC # BLD: 12.27 K/UL — HIGH (ref 3.8–10.5)
WBC # BLD: 14.01 K/UL — HIGH (ref 3.8–10.5)
WBC # BLD: 14.49 K/UL — HIGH (ref 3.8–10.5)
WBC # BLD: 14.89 K/UL — HIGH (ref 3.8–10.5)
WBC # BLD: 14.93 K/UL — HIGH (ref 3.8–10.5)
WBC # BLD: 15.71 K/UL — HIGH (ref 3.8–10.5)
WBC # BLD: 15.74 K/UL — HIGH (ref 3.8–10.5)
WBC # BLD: 16.34 K/UL — HIGH (ref 3.8–10.5)
WBC # BLD: 17.21 K/UL — HIGH (ref 3.8–10.5)
WBC # BLD: 18.37 K/UL — HIGH (ref 3.8–10.5)
WBC # BLD: 18.81 K/UL — HIGH (ref 3.8–10.5)
WBC # BLD: 19.56 K/UL — HIGH (ref 3.8–10.5)
WBC # BLD: 19.59 K/UL — HIGH (ref 3.8–10.5)
WBC # BLD: 21.55 K/UL — HIGH (ref 3.8–10.5)
WBC # BLD: 24.05 K/UL — HIGH (ref 3.8–10.5)
WBC # BLD: 24.86 K/UL — HIGH (ref 3.8–10.5)
WBC # BLD: 27.5 K/UL — HIGH (ref 3.8–10.5)
WBC # BLD: 30.73 K/UL — HIGH (ref 3.8–10.5)
WBC # BLD: 32.02 K/UL — HIGH (ref 3.8–10.5)
WBC # BLD: 32.6 K/UL — HIGH (ref 3.8–10.5)
WBC # BLD: 32.61 K/UL — HIGH (ref 3.8–10.5)
WBC # BLD: 37.6 K/UL — HIGH (ref 3.8–10.5)
WBC # BLD: 41.06 K/UL — CRITICAL HIGH (ref 3.8–10.5)
WBC # FLD AUTO: 12.27 K/UL — HIGH (ref 3.8–10.5)
WBC # FLD AUTO: 14.01 K/UL — HIGH (ref 3.8–10.5)
WBC # FLD AUTO: 14.49 K/UL — HIGH (ref 3.8–10.5)
WBC # FLD AUTO: 14.89 K/UL — HIGH (ref 3.8–10.5)
WBC # FLD AUTO: 14.93 K/UL — HIGH (ref 3.8–10.5)
WBC # FLD AUTO: 15.71 K/UL — HIGH (ref 3.8–10.5)
WBC # FLD AUTO: 15.74 K/UL — HIGH (ref 3.8–10.5)
WBC # FLD AUTO: 16.34 K/UL — HIGH (ref 3.8–10.5)
WBC # FLD AUTO: 17.21 K/UL — HIGH (ref 3.8–10.5)
WBC # FLD AUTO: 18.37 K/UL — HIGH (ref 3.8–10.5)
WBC # FLD AUTO: 18.81 K/UL — HIGH (ref 3.8–10.5)
WBC # FLD AUTO: 19.56 K/UL — HIGH (ref 3.8–10.5)
WBC # FLD AUTO: 19.59 K/UL — HIGH (ref 3.8–10.5)
WBC # FLD AUTO: 21.55 K/UL — HIGH (ref 3.8–10.5)
WBC # FLD AUTO: 24.05 K/UL — HIGH (ref 3.8–10.5)
WBC # FLD AUTO: 24.86 K/UL — HIGH (ref 3.8–10.5)
WBC # FLD AUTO: 27.5 K/UL — HIGH (ref 3.8–10.5)
WBC # FLD AUTO: 30.73 K/UL — HIGH (ref 3.8–10.5)
WBC # FLD AUTO: 32.02 K/UL — HIGH (ref 3.8–10.5)
WBC # FLD AUTO: 32.6 K/UL — HIGH (ref 3.8–10.5)
WBC # FLD AUTO: 32.61 K/UL — HIGH (ref 3.8–10.5)
WBC # FLD AUTO: 37.6 K/UL — HIGH (ref 3.8–10.5)
WBC # FLD AUTO: 41.06 K/UL — CRITICAL HIGH (ref 3.8–10.5)
WBC UR QL: 37 /HPF — HIGH (ref 0–5)
WBC UR QL: 423 /HPF — HIGH (ref 0–5)
WBC UR QL: SIGNIFICANT CHANGE UP /HPF (ref 0–5)

## 2023-01-01 PROCEDURE — 99239 HOSP IP/OBS DSCHRG MGMT >30: CPT

## 2023-01-01 PROCEDURE — 71045 X-RAY EXAM CHEST 1 VIEW: CPT | Mod: 26

## 2023-01-01 PROCEDURE — 45331 SIGMOIDOSCOPY AND BIOPSY: CPT | Mod: GC,59

## 2023-01-01 PROCEDURE — 99284 EMERGENCY DEPT VISIT MOD MDM: CPT

## 2023-01-01 PROCEDURE — 36556 INSERT NON-TUNNEL CV CATH: CPT | Mod: GC

## 2023-01-01 PROCEDURE — 88341 IMHCHEM/IMCYTCHM EA ADD ANTB: CPT | Mod: 26

## 2023-01-01 PROCEDURE — 74181 MRI ABDOMEN W/O CONTRAST: CPT | Mod: 26

## 2023-01-01 PROCEDURE — 99223 1ST HOSP IP/OBS HIGH 75: CPT | Mod: GC

## 2023-01-01 PROCEDURE — 74176 CT ABD & PELVIS W/O CONTRAST: CPT | Mod: 26,MA

## 2023-01-01 PROCEDURE — 76700 US EXAM ABDOM COMPLETE: CPT | Mod: 26

## 2023-01-01 PROCEDURE — 88305 TISSUE EXAM BY PATHOLOGIST: CPT | Mod: 26

## 2023-01-01 PROCEDURE — 99233 SBSQ HOSP IP/OBS HIGH 50: CPT

## 2023-01-01 PROCEDURE — 99204 OFFICE O/P NEW MOD 45 MIN: CPT

## 2023-01-01 PROCEDURE — 99232 SBSQ HOSP IP/OBS MODERATE 35: CPT | Mod: GC

## 2023-01-01 PROCEDURE — 93308 TTE F-UP OR LMTD: CPT | Mod: 26,GC

## 2023-01-01 PROCEDURE — 99292 CRITICAL CARE ADDL 30 MIN: CPT | Mod: GC,25

## 2023-01-01 PROCEDURE — 99222 1ST HOSP IP/OBS MODERATE 55: CPT

## 2023-01-01 PROCEDURE — 99222 1ST HOSP IP/OBS MODERATE 55: CPT | Mod: GC

## 2023-01-01 PROCEDURE — 99285 EMERGENCY DEPT VISIT HI MDM: CPT

## 2023-01-01 PROCEDURE — 71046 X-RAY EXAM CHEST 2 VIEWS: CPT | Mod: 26

## 2023-01-01 PROCEDURE — 99223 1ST HOSP IP/OBS HIGH 75: CPT

## 2023-01-01 PROCEDURE — 76937 US GUIDE VASCULAR ACCESS: CPT | Mod: 26

## 2023-01-01 PROCEDURE — 36000 PLACE NEEDLE IN VEIN: CPT

## 2023-01-01 PROCEDURE — 93010 ELECTROCARDIOGRAM REPORT: CPT

## 2023-01-01 PROCEDURE — 76770 US EXAM ABDO BACK WALL COMP: CPT | Mod: 26

## 2023-01-01 PROCEDURE — 36620 INSERTION CATHETER ARTERY: CPT | Mod: GC

## 2023-01-01 PROCEDURE — 90945 DIALYSIS ONE EVALUATION: CPT | Mod: GC

## 2023-01-01 PROCEDURE — 71250 CT THORAX DX C-: CPT | Mod: 26

## 2023-01-01 PROCEDURE — 36680 INSERT NEEDLE BONE CAVITY: CPT | Mod: GC,59

## 2023-01-01 PROCEDURE — 99291 CRITICAL CARE FIRST HOUR: CPT | Mod: GC,25

## 2023-01-01 PROCEDURE — 93970 EXTREMITY STUDY: CPT | Mod: 26

## 2023-01-01 PROCEDURE — 36569 INSJ PICC 5 YR+ W/O IMAGING: CPT | Mod: GC

## 2023-01-01 PROCEDURE — 93306 TTE W/DOPPLER COMPLETE: CPT | Mod: 26

## 2023-01-01 PROCEDURE — 99291 CRITICAL CARE FIRST HOUR: CPT | Mod: GC

## 2023-01-01 PROCEDURE — 73610 X-RAY EXAM OF ANKLE: CPT | Mod: 26,LT

## 2023-01-01 PROCEDURE — 76705 ECHO EXAM OF ABDOMEN: CPT | Mod: 26

## 2023-01-01 PROCEDURE — 43235 EGD DIAGNOSTIC BRUSH WASH: CPT | Mod: GC

## 2023-01-01 PROCEDURE — 99232 SBSQ HOSP IP/OBS MODERATE 35: CPT

## 2023-01-01 PROCEDURE — 99291 CRITICAL CARE FIRST HOUR: CPT

## 2023-01-01 PROCEDURE — 88342 IMHCHEM/IMCYTCHM 1ST ANTB: CPT | Mod: 26

## 2023-01-01 RX ORDER — SEVELAMER CARBONATE 2400 MG/1
800 POWDER, FOR SUSPENSION ORAL THREE TIMES A DAY
Refills: 0 | Status: DISCONTINUED | OUTPATIENT
Start: 2023-01-01 | End: 2023-01-01

## 2023-01-01 RX ORDER — SODIUM BICARBONATE 1 MEQ/ML
0.17 SYRINGE (ML) INTRAVENOUS
Qty: 150 | Refills: 0 | Status: DISCONTINUED | OUTPATIENT
Start: 2023-01-01 | End: 2023-01-01

## 2023-01-01 RX ORDER — PANTOPRAZOLE SODIUM 20 MG/1
40 TABLET, DELAYED RELEASE ORAL
Refills: 0 | Status: DISCONTINUED | OUTPATIENT
Start: 2023-01-01 | End: 2023-01-01

## 2023-01-01 RX ORDER — ALBUMIN HUMAN 25 %
100 VIAL (ML) INTRAVENOUS ONCE
Refills: 0 | Status: COMPLETED | OUTPATIENT
Start: 2023-01-01 | End: 2023-01-01

## 2023-01-01 RX ORDER — DEXTROSE 50 % IN WATER 50 %
50 SYRINGE (ML) INTRAVENOUS ONCE
Refills: 0 | Status: COMPLETED | OUTPATIENT
Start: 2023-01-01 | End: 2023-01-01

## 2023-01-01 RX ORDER — CALCIUM GLUCONATE 100 MG/ML
2 VIAL (ML) INTRAVENOUS ONCE
Refills: 0 | Status: COMPLETED | OUTPATIENT
Start: 2023-01-01 | End: 2023-01-01

## 2023-01-01 RX ORDER — HYDROMORPHONE HYDROCHLORIDE 2 MG/ML
0.25 INJECTION INTRAMUSCULAR; INTRAVENOUS; SUBCUTANEOUS ONCE
Refills: 0 | Status: DISCONTINUED | OUTPATIENT
Start: 2023-01-01 | End: 2023-01-01

## 2023-01-01 RX ORDER — PROPOFOL 10 MG/ML
30 INJECTION, EMULSION INTRAVENOUS
Qty: 1000 | Refills: 0 | Status: DISCONTINUED | OUTPATIENT
Start: 2023-01-01 | End: 2023-01-01

## 2023-01-01 RX ORDER — ACETAMINOPHEN 500 MG
650 TABLET ORAL EVERY 6 HOURS
Refills: 0 | Status: DISCONTINUED | OUTPATIENT
Start: 2023-01-01 | End: 2023-01-01

## 2023-01-01 RX ORDER — SODIUM BICARBONATE 1 MEQ/ML
50 SYRINGE (ML) INTRAVENOUS ONCE
Refills: 0 | Status: COMPLETED | OUTPATIENT
Start: 2023-01-01 | End: 2023-01-01

## 2023-01-01 RX ORDER — SERTRALINE 25 MG/1
1 TABLET, FILM COATED ORAL
Refills: 0 | DISCHARGE

## 2023-01-01 RX ORDER — ALLOPURINOL 300 MG
1 TABLET ORAL
Qty: 30 | Refills: 0
Start: 2023-01-01 | End: 2023-10-13

## 2023-01-01 RX ORDER — IBUPROFEN 800 MG/1
800 TABLET, FILM COATED ORAL
Qty: 20 | Refills: 0 | Status: ACTIVE | COMMUNITY
Start: 2023-01-01

## 2023-01-01 RX ORDER — TRAZODONE HCL 50 MG
1 TABLET ORAL
Refills: 0 | DISCHARGE

## 2023-01-01 RX ORDER — AMLODIPINE BESYLATE 10 MG/1
10 TABLET ORAL
Refills: 0 | Status: ACTIVE | COMMUNITY

## 2023-01-01 RX ORDER — NOREPINEPHRINE BITARTRATE/D5W 8 MG/250ML
0.05 PLASTIC BAG, INJECTION (ML) INTRAVENOUS
Qty: 16 | Refills: 0 | Status: DISCONTINUED | OUTPATIENT
Start: 2023-01-01 | End: 2023-01-01

## 2023-01-01 RX ORDER — METOPROLOL TARTRATE 50 MG
1 TABLET ORAL
Refills: 0 | DISCHARGE

## 2023-01-01 RX ORDER — HEPARIN SODIUM 5000 [USP'U]/ML
3500 INJECTION INTRAVENOUS; SUBCUTANEOUS EVERY 6 HOURS
Refills: 0 | Status: DISCONTINUED | OUTPATIENT
Start: 2023-01-01 | End: 2023-01-01

## 2023-01-01 RX ORDER — HEPARIN SODIUM 5000 [USP'U]/ML
INJECTION INTRAVENOUS; SUBCUTANEOUS
Qty: 25000 | Refills: 0 | Status: DISCONTINUED | OUTPATIENT
Start: 2023-01-01 | End: 2023-01-01

## 2023-01-01 RX ORDER — PANTOPRAZOLE SODIUM 20 MG/1
1 TABLET, DELAYED RELEASE ORAL
Refills: 0 | DISCHARGE

## 2023-01-01 RX ORDER — CHLORHEXIDINE GLUCONATE 213 G/1000ML
15 SOLUTION TOPICAL EVERY 12 HOURS
Refills: 0 | Status: DISCONTINUED | OUTPATIENT
Start: 2023-01-01 | End: 2023-01-01

## 2023-01-01 RX ORDER — SODIUM ZIRCONIUM CYCLOSILICATE 10 G/10G
10 POWDER, FOR SUSPENSION ORAL ONCE
Refills: 0 | Status: COMPLETED | OUTPATIENT
Start: 2023-01-01 | End: 2023-01-01

## 2023-01-01 RX ORDER — CEFTRIAXONE 500 MG/1
1000 INJECTION, POWDER, FOR SOLUTION INTRAMUSCULAR; INTRAVENOUS EVERY 24 HOURS
Refills: 0 | Status: DISCONTINUED | OUTPATIENT
Start: 2023-01-01 | End: 2023-01-01

## 2023-01-01 RX ORDER — HEPARIN SODIUM 5000 [USP'U]/ML
7000 INJECTION INTRAVENOUS; SUBCUTANEOUS EVERY 6 HOURS
Refills: 0 | Status: DISCONTINUED | OUTPATIENT
Start: 2023-01-01 | End: 2023-01-01

## 2023-01-01 RX ORDER — SERTRALINE 25 MG/1
25 TABLET, FILM COATED ORAL DAILY
Refills: 0 | Status: DISCONTINUED | OUTPATIENT
Start: 2023-01-01 | End: 2023-01-01

## 2023-01-01 RX ORDER — APIXABAN 2.5 MG/1
10 TABLET, FILM COATED ORAL EVERY 12 HOURS
Refills: 0 | Status: DISCONTINUED | OUTPATIENT
Start: 2023-01-01 | End: 2023-01-01

## 2023-01-01 RX ORDER — ALLOPURINOL 300 MG
100 TABLET ORAL DAILY
Refills: 0 | Status: DISCONTINUED | OUTPATIENT
Start: 2023-01-01 | End: 2023-01-01

## 2023-01-01 RX ORDER — RASBURICASE 7.5 MG
6 KIT INTRAVENOUS ONCE
Refills: 0 | Status: COMPLETED | OUTPATIENT
Start: 2023-01-01 | End: 2023-01-01

## 2023-01-01 RX ORDER — SODIUM CHLORIDE 9 MG/ML
1000 INJECTION INTRAMUSCULAR; INTRAVENOUS; SUBCUTANEOUS
Refills: 0 | Status: DISCONTINUED | OUTPATIENT
Start: 2023-01-01 | End: 2023-01-01

## 2023-01-01 RX ORDER — CALCIUM GLUCONATE 100 MG/ML
1 VIAL (ML) INTRAVENOUS ONCE
Refills: 0 | Status: COMPLETED | OUTPATIENT
Start: 2023-01-01 | End: 2023-01-01

## 2023-01-01 RX ORDER — PHENYLEPHRINE HYDROCHLORIDE 10 MG/ML
1 INJECTION INTRAVENOUS
Qty: 160 | Refills: 0 | Status: DISCONTINUED | OUTPATIENT
Start: 2023-01-01 | End: 2023-01-01

## 2023-01-01 RX ORDER — LISINOPRIL 2.5 MG/1
1 TABLET ORAL
Refills: 0 | DISCHARGE

## 2023-01-01 RX ORDER — NOREPINEPHRINE BITARTRATE/D5W 8 MG/250ML
0.05 PLASTIC BAG, INJECTION (ML) INTRAVENOUS
Qty: 8 | Refills: 0 | Status: DISCONTINUED | OUTPATIENT
Start: 2023-01-01 | End: 2023-01-01

## 2023-01-01 RX ORDER — HEPARIN SODIUM 5000 [USP'U]/ML
1100 INJECTION INTRAVENOUS; SUBCUTANEOUS
Qty: 25000 | Refills: 0 | Status: DISCONTINUED | OUTPATIENT
Start: 2023-01-01 | End: 2023-01-01

## 2023-01-01 RX ORDER — DEXTROSE 50 % IN WATER 50 %
100 SYRINGE (ML) INTRAVENOUS ONCE
Refills: 0 | Status: COMPLETED | OUTPATIENT
Start: 2023-01-01 | End: 2023-01-01

## 2023-01-01 RX ORDER — TRAZODONE HCL 50 MG
50 TABLET ORAL AT BEDTIME
Refills: 0 | Status: DISCONTINUED | OUTPATIENT
Start: 2023-01-01 | End: 2023-01-01

## 2023-01-01 RX ORDER — APIXABAN 2.5 MG/1
1 TABLET, FILM COATED ORAL
Qty: 60 | Refills: 0
Start: 2023-01-01 | End: 2023-10-13

## 2023-01-01 RX ORDER — INSULIN HUMAN 100 [IU]/ML
5 INJECTION, SOLUTION SUBCUTANEOUS ONCE
Refills: 0 | Status: COMPLETED | OUTPATIENT
Start: 2023-01-01 | End: 2023-01-01

## 2023-01-01 RX ORDER — MORPHINE SULFATE 50 MG/1
2 CAPSULE, EXTENDED RELEASE ORAL ONCE
Refills: 0 | Status: DISCONTINUED | OUTPATIENT
Start: 2023-01-01 | End: 2023-01-01

## 2023-01-01 RX ORDER — SODIUM CHLORIDE 9 MG/ML
1000 INJECTION, SOLUTION INTRAVENOUS
Refills: 0 | Status: DISCONTINUED | OUTPATIENT
Start: 2023-01-01 | End: 2023-01-01

## 2023-01-01 RX ORDER — PIPERACILLIN AND TAZOBACTAM 4; .5 G/20ML; G/20ML
3.38 INJECTION, POWDER, LYOPHILIZED, FOR SOLUTION INTRAVENOUS EVERY 12 HOURS
Refills: 0 | Status: DISCONTINUED | OUTPATIENT
Start: 2023-01-01 | End: 2023-01-01

## 2023-01-01 RX ORDER — VASOPRESSIN 20 [USP'U]/ML
0.08 INJECTION INTRAVENOUS
Qty: 40 | Refills: 0 | Status: DISCONTINUED | OUTPATIENT
Start: 2023-01-01 | End: 2023-01-01

## 2023-01-01 RX ORDER — LISINOPRIL 40 MG/1
40 TABLET ORAL
Refills: 0 | Status: ACTIVE | COMMUNITY

## 2023-01-01 RX ORDER — ONDANSETRON 8 MG/1
4 TABLET, FILM COATED ORAL EVERY 8 HOURS
Refills: 0 | Status: DISCONTINUED | OUTPATIENT
Start: 2023-01-01 | End: 2023-01-01

## 2023-01-01 RX ORDER — HYDROCORTISONE 20 MG
50 TABLET ORAL EVERY 6 HOURS
Refills: 0 | Status: DISCONTINUED | OUTPATIENT
Start: 2023-01-01 | End: 2023-01-01

## 2023-01-01 RX ORDER — INSULIN HUMAN 100 [IU]/ML
10 INJECTION, SOLUTION SUBCUTANEOUS ONCE
Refills: 0 | Status: COMPLETED | OUTPATIENT
Start: 2023-01-01 | End: 2023-01-01

## 2023-01-01 RX ORDER — VANCOMYCIN HCL 1 G
1000 VIAL (EA) INTRAVENOUS ONCE
Refills: 0 | Status: COMPLETED | OUTPATIENT
Start: 2023-01-01 | End: 2023-01-01

## 2023-01-01 RX ORDER — VANCOMYCIN HCL 1 G
1000 VIAL (EA) INTRAVENOUS EVERY 12 HOURS
Refills: 0 | Status: DISCONTINUED | OUTPATIENT
Start: 2023-01-01 | End: 2023-01-01

## 2023-01-01 RX ORDER — AMOXICILLIN 500 MG/1
500 CAPSULE ORAL
Qty: 21 | Refills: 0 | Status: ACTIVE | COMMUNITY
Start: 2023-01-01

## 2023-01-01 RX ORDER — AMLODIPINE BESYLATE 2.5 MG/1
1 TABLET ORAL
Refills: 0 | DISCHARGE

## 2023-01-01 RX ORDER — ALBUMIN HUMAN 25 %
50 VIAL (ML) INTRAVENOUS ONCE
Refills: 0 | Status: COMPLETED | OUTPATIENT
Start: 2023-01-01 | End: 2023-01-01

## 2023-01-01 RX ORDER — LANOLIN ALCOHOL/MO/W.PET/CERES
3 CREAM (GRAM) TOPICAL AT BEDTIME
Refills: 0 | Status: DISCONTINUED | OUTPATIENT
Start: 2023-01-01 | End: 2023-01-01

## 2023-01-01 RX ORDER — PIPERACILLIN AND TAZOBACTAM 4; .5 G/20ML; G/20ML
3.38 INJECTION, POWDER, LYOPHILIZED, FOR SOLUTION INTRAVENOUS ONCE
Refills: 0 | Status: COMPLETED | OUTPATIENT
Start: 2023-01-01 | End: 2023-01-01

## 2023-01-01 RX ORDER — OXYCODONE HYDROCHLORIDE 5 MG/1
5 TABLET ORAL EVERY 6 HOURS
Refills: 0 | Status: DISCONTINUED | OUTPATIENT
Start: 2023-01-01 | End: 2023-01-01

## 2023-01-01 RX ORDER — APIXABAN 2.5 MG/1
1 TABLET, FILM COATED ORAL
Qty: 1 | Refills: 0
Start: 2023-01-01

## 2023-01-01 RX ORDER — INFLUENZA VIRUS VACCINE 15; 15; 15; 15 UG/.5ML; UG/.5ML; UG/.5ML; UG/.5ML
0.5 SUSPENSION INTRAMUSCULAR ONCE
Refills: 0 | Status: DISCONTINUED | OUTPATIENT
Start: 2023-01-01 | End: 2023-01-01

## 2023-01-01 RX ORDER — HEPARIN SODIUM 5000 [USP'U]/ML
7000 INJECTION INTRAVENOUS; SUBCUTANEOUS ONCE
Refills: 0 | Status: COMPLETED | OUTPATIENT
Start: 2023-01-01 | End: 2023-01-01

## 2023-01-01 RX ORDER — METOPROLOL TARTRATE 25 MG/1
25 TABLET, FILM COATED ORAL
Refills: 0 | Status: ACTIVE | COMMUNITY

## 2023-01-01 RX ORDER — HEPARIN SODIUM 5000 [USP'U]/ML
7000 INJECTION INTRAVENOUS; SUBCUTANEOUS ONCE
Refills: 0 | Status: DISCONTINUED | OUTPATIENT
Start: 2023-01-01 | End: 2023-01-01

## 2023-01-01 RX ORDER — VASOPRESSIN 20 [USP'U]/ML
0.04 INJECTION INTRAVENOUS
Qty: 40 | Refills: 0 | Status: DISCONTINUED | OUTPATIENT
Start: 2023-01-01 | End: 2023-01-01

## 2023-01-01 RX ORDER — CHLORHEXIDINE GLUCONATE 213 G/1000ML
1 SOLUTION TOPICAL DAILY
Refills: 0 | Status: DISCONTINUED | OUTPATIENT
Start: 2023-01-01 | End: 2023-01-01

## 2023-01-01 RX ORDER — RIVAROXABAN 15 MG-20MG
15 KIT ORAL
Qty: 1 | Refills: 0
Start: 2023-01-01 | End: 2023-10-04

## 2023-01-01 RX ORDER — OMEPRAZOLE 20 MG/1
20 CAPSULE, DELAYED RELEASE ORAL
Refills: 0 | Status: ACTIVE | COMMUNITY

## 2023-01-01 RX ORDER — PANTOPRAZOLE 40 MG/1
40 TABLET, DELAYED RELEASE ORAL
Qty: 90 | Refills: 0 | Status: ACTIVE | COMMUNITY
Start: 2023-01-01 | End: 1900-01-01

## 2023-01-01 RX ORDER — DEXTROSE 50 % IN WATER 50 %
50 SYRINGE (ML) INTRAVENOUS ONCE
Refills: 0 | Status: DISCONTINUED | OUTPATIENT
Start: 2023-01-01 | End: 2023-01-01

## 2023-01-01 RX ORDER — SODIUM BICARBONATE 1 MEQ/ML
50 SYRINGE (ML) INTRAVENOUS ONCE
Refills: 0 | Status: DISCONTINUED | OUTPATIENT
Start: 2023-01-01 | End: 2023-01-01

## 2023-01-01 RX ORDER — TRAZODONE HCL 50 MG
50 TABLET ORAL ONCE
Refills: 0 | Status: COMPLETED | OUTPATIENT
Start: 2023-01-01 | End: 2023-01-01

## 2023-01-01 RX ORDER — AMLODIPINE BESYLATE 2.5 MG/1
10 TABLET ORAL ONCE
Refills: 0 | Status: COMPLETED | OUTPATIENT
Start: 2023-01-01 | End: 2023-01-01

## 2023-01-01 RX ORDER — LANOLIN ALCOHOL/MO/W.PET/CERES
6 CREAM (GRAM) TOPICAL AT BEDTIME
Refills: 0 | Status: COMPLETED | OUTPATIENT
Start: 2023-01-01 | End: 2023-01-01

## 2023-01-01 RX ORDER — SODIUM CHLORIDE 9 MG/ML
1000 INJECTION INTRAMUSCULAR; INTRAVENOUS; SUBCUTANEOUS ONCE
Refills: 0 | Status: COMPLETED | OUTPATIENT
Start: 2023-01-01 | End: 2023-01-01

## 2023-01-01 RX ORDER — SODIUM CHLORIDE 9 MG/ML
1000 INJECTION, SOLUTION INTRAVENOUS ONCE
Refills: 0 | Status: COMPLETED | OUTPATIENT
Start: 2023-01-01 | End: 2023-01-01

## 2023-01-01 RX ORDER — PANTOPRAZOLE SODIUM 20 MG/1
40 TABLET, DELAYED RELEASE ORAL DAILY
Refills: 0 | Status: DISCONTINUED | OUTPATIENT
Start: 2023-01-01 | End: 2023-01-01

## 2023-01-01 RX ORDER — INSULIN HUMAN 100 [IU]/ML
5 INJECTION, SOLUTION SUBCUTANEOUS ONCE
Refills: 0 | Status: DISCONTINUED | OUTPATIENT
Start: 2023-01-01 | End: 2023-01-01

## 2023-01-01 RX ORDER — APIXABAN 2.5 MG/1
5 TABLET, FILM COATED ORAL
Refills: 0 | Status: DISCONTINUED | OUTPATIENT
Start: 2023-01-01 | End: 2023-01-01

## 2023-01-01 RX ORDER — POLYETHYLENE GLYCOL 3350 AND ELECTROLYTES WITH LEMON FLAVOR 236; 22.74; 6.74; 5.86; 2.97 G/4L; G/4L; G/4L; G/4L; G/4L
236 POWDER, FOR SOLUTION ORAL
Qty: 1 | Refills: 0 | Status: ACTIVE | COMMUNITY
Start: 2023-01-01 | End: 1900-01-01

## 2023-01-01 RX ORDER — LISINOPRIL 20 MG/1
20 TABLET ORAL
Qty: 30 | Refills: 0 | Status: ACTIVE | COMMUNITY
Start: 2023-01-01

## 2023-01-01 RX ORDER — PIPERACILLIN AND TAZOBACTAM 4; .5 G/20ML; G/20ML
4.5 INJECTION, POWDER, LYOPHILIZED, FOR SOLUTION INTRAVENOUS EVERY 8 HOURS
Refills: 0 | Status: DISCONTINUED | OUTPATIENT
Start: 2023-01-01 | End: 2023-01-01

## 2023-01-01 RX ORDER — LANOLIN ALCOHOL/MO/W.PET/CERES
3 CREAM (GRAM) TOPICAL ONCE
Refills: 0 | Status: COMPLETED | OUTPATIENT
Start: 2023-01-01 | End: 2023-01-01

## 2023-01-01 RX ADMIN — Medication 3 MILLIGRAM(S): at 21:40

## 2023-01-01 RX ADMIN — Medication 100 MEQ/KG/HR: at 19:44

## 2023-01-01 RX ADMIN — Medication 100 MEQ/KG/HR: at 10:52

## 2023-01-01 RX ADMIN — Medication 50 MILLIGRAM(S): at 01:00

## 2023-01-01 RX ADMIN — Medication 100 MILLIGRAM(S): at 12:06

## 2023-01-01 RX ADMIN — Medication 50 MILLIGRAM(S): at 09:27

## 2023-01-01 RX ADMIN — SODIUM ZIRCONIUM CYCLOSILICATE 10 GRAM(S): 10 POWDER, FOR SUSPENSION ORAL at 13:02

## 2023-01-01 RX ADMIN — HEPARIN SODIUM 1100 UNIT(S)/HR: 5000 INJECTION INTRAVENOUS; SUBCUTANEOUS at 19:06

## 2023-01-01 RX ADMIN — AMLODIPINE BESYLATE 10 MILLIGRAM(S): 2.5 TABLET ORAL at 14:25

## 2023-01-01 RX ADMIN — SODIUM CHLORIDE 125 MILLILITER(S): 9 INJECTION INTRAMUSCULAR; INTRAVENOUS; SUBCUTANEOUS at 11:14

## 2023-01-01 RX ADMIN — HEPARIN SODIUM 7000 UNIT(S): 5000 INJECTION INTRAVENOUS; SUBCUTANEOUS at 16:51

## 2023-01-01 RX ADMIN — Medication 100 MILLILITER(S): at 17:38

## 2023-01-01 RX ADMIN — HEPARIN SODIUM 0 UNIT(S)/HR: 5000 INJECTION INTRAVENOUS; SUBCUTANEOUS at 06:19

## 2023-01-01 RX ADMIN — Medication 3 MILLIGRAM(S): at 21:02

## 2023-01-01 RX ADMIN — Medication 50 MILLIGRAM(S): at 22:26

## 2023-01-01 RX ADMIN — Medication 3 MILLIGRAM(S): at 02:34

## 2023-01-01 RX ADMIN — PIPERACILLIN AND TAZOBACTAM 200 GRAM(S): 4; .5 INJECTION, POWDER, LYOPHILIZED, FOR SOLUTION INTRAVENOUS at 18:20

## 2023-01-01 RX ADMIN — Medication 50 MILLIGRAM(S): at 17:22

## 2023-01-01 RX ADMIN — PANTOPRAZOLE SODIUM 40 MILLIGRAM(S): 20 TABLET, DELAYED RELEASE ORAL at 17:02

## 2023-01-01 RX ADMIN — HYDROMORPHONE HYDROCHLORIDE 0.25 MILLIGRAM(S): 2 INJECTION INTRAMUSCULAR; INTRAVENOUS; SUBCUTANEOUS at 12:05

## 2023-01-01 RX ADMIN — CHLORHEXIDINE GLUCONATE 15 MILLILITER(S): 213 SOLUTION TOPICAL at 20:49

## 2023-01-01 RX ADMIN — Medication 50 MILLIGRAM(S): at 21:02

## 2023-01-01 RX ADMIN — SODIUM CHLORIDE 100 MILLILITER(S): 9 INJECTION INTRAMUSCULAR; INTRAVENOUS; SUBCUTANEOUS at 13:06

## 2023-01-01 RX ADMIN — SERTRALINE 25 MILLIGRAM(S): 25 TABLET, FILM COATED ORAL at 12:06

## 2023-01-01 RX ADMIN — SEVELAMER CARBONATE 800 MILLIGRAM(S): 2400 POWDER, FOR SUSPENSION ORAL at 23:06

## 2023-01-01 RX ADMIN — HEPARIN SODIUM 1100 UNIT(S)/HR: 5000 INJECTION INTRAVENOUS; SUBCUTANEOUS at 19:03

## 2023-01-01 RX ADMIN — PANTOPRAZOLE SODIUM 40 MILLIGRAM(S): 20 TABLET, DELAYED RELEASE ORAL at 05:29

## 2023-01-01 RX ADMIN — Medication 6 MILLIGRAM(S): at 23:51

## 2023-01-01 RX ADMIN — PROPOFOL 16.2 MICROGRAM(S)/KG/MIN: 10 INJECTION, EMULSION INTRAVENOUS at 17:06

## 2023-01-01 RX ADMIN — Medication 8.44 MICROGRAM(S)/KG/MIN: at 17:06

## 2023-01-01 RX ADMIN — PANTOPRAZOLE SODIUM 40 MILLIGRAM(S): 20 TABLET, DELAYED RELEASE ORAL at 05:05

## 2023-01-01 RX ADMIN — VASOPRESSIN 6 UNIT(S)/MIN: 20 INJECTION INTRAVENOUS at 17:06

## 2023-01-01 RX ADMIN — HEPARIN SODIUM 3500 UNIT(S): 5000 INJECTION INTRAVENOUS; SUBCUTANEOUS at 18:24

## 2023-01-01 RX ADMIN — Medication 50 MILLIGRAM(S): at 23:51

## 2023-01-01 RX ADMIN — Medication 100 MEQ/KG/HR: at 21:16

## 2023-01-01 RX ADMIN — APIXABAN 5 MILLIGRAM(S): 2.5 TABLET, FILM COATED ORAL at 05:23

## 2023-01-01 RX ADMIN — HYDROMORPHONE HYDROCHLORIDE 0.25 MILLIGRAM(S): 2 INJECTION INTRAMUSCULAR; INTRAVENOUS; SUBCUTANEOUS at 11:38

## 2023-01-01 RX ADMIN — MORPHINE SULFATE 2 MILLIGRAM(S): 50 CAPSULE, EXTENDED RELEASE ORAL at 11:35

## 2023-01-01 RX ADMIN — Medication 30 MILLILITER(S): at 13:14

## 2023-01-01 RX ADMIN — SODIUM CHLORIDE 100 MILLILITER(S): 9 INJECTION INTRAMUSCULAR; INTRAVENOUS; SUBCUTANEOUS at 19:21

## 2023-01-01 RX ADMIN — HEPARIN SODIUM 1100 UNIT(S)/HR: 5000 INJECTION INTRAVENOUS; SUBCUTANEOUS at 07:23

## 2023-01-01 RX ADMIN — PANTOPRAZOLE SODIUM 40 MILLIGRAM(S): 20 TABLET, DELAYED RELEASE ORAL at 05:24

## 2023-01-01 RX ADMIN — Medication 50 MILLIGRAM(S): at 02:34

## 2023-01-01 RX ADMIN — INSULIN HUMAN 5 UNIT(S): 100 INJECTION, SOLUTION SUBCUTANEOUS at 15:49

## 2023-01-01 RX ADMIN — VASOPRESSIN 12 UNIT(S)/MIN: 20 INJECTION INTRAVENOUS at 08:09

## 2023-01-01 RX ADMIN — HEPARIN SODIUM 1300 UNIT(S)/HR: 5000 INJECTION INTRAVENOUS; SUBCUTANEOUS at 01:04

## 2023-01-01 RX ADMIN — Medication 50 MILLIGRAM(S): at 22:14

## 2023-01-01 RX ADMIN — Medication 50 MILLIGRAM(S): at 21:33

## 2023-01-01 RX ADMIN — CHLORHEXIDINE GLUCONATE 15 MILLILITER(S): 213 SOLUTION TOPICAL at 06:25

## 2023-01-01 RX ADMIN — SODIUM ZIRCONIUM CYCLOSILICATE 10 GRAM(S): 10 POWDER, FOR SUSPENSION ORAL at 15:50

## 2023-01-01 RX ADMIN — PIPERACILLIN AND TAZOBACTAM 25 GRAM(S): 4; .5 INJECTION, POWDER, LYOPHILIZED, FOR SOLUTION INTRAVENOUS at 23:51

## 2023-01-01 RX ADMIN — VASOPRESSIN 6 UNIT(S)/MIN: 20 INJECTION INTRAVENOUS at 19:45

## 2023-01-01 RX ADMIN — HEPARIN SODIUM 900 UNIT(S)/HR: 5000 INJECTION INTRAVENOUS; SUBCUTANEOUS at 15:49

## 2023-01-01 RX ADMIN — SODIUM CHLORIDE 250 MILLILITER(S): 9 INJECTION, SOLUTION INTRAVENOUS at 17:08

## 2023-01-01 RX ADMIN — SEVELAMER CARBONATE 800 MILLIGRAM(S): 2400 POWDER, FOR SUSPENSION ORAL at 21:33

## 2023-01-01 RX ADMIN — APIXABAN 10 MILLIGRAM(S): 2.5 TABLET, FILM COATED ORAL at 13:43

## 2023-01-01 RX ADMIN — HEPARIN SODIUM 1100 UNIT(S)/HR: 5000 INJECTION INTRAVENOUS; SUBCUTANEOUS at 01:00

## 2023-01-01 RX ADMIN — PANTOPRAZOLE SODIUM 40 MILLIGRAM(S): 20 TABLET, DELAYED RELEASE ORAL at 05:40

## 2023-01-01 RX ADMIN — HEPARIN SODIUM 0 UNIT(S)/HR: 5000 INJECTION INTRAVENOUS; SUBCUTANEOUS at 00:02

## 2023-01-01 RX ADMIN — Medication 100 MILLIGRAM(S): at 13:02

## 2023-01-01 RX ADMIN — PANTOPRAZOLE SODIUM 40 MILLIGRAM(S): 20 TABLET, DELAYED RELEASE ORAL at 05:02

## 2023-01-01 RX ADMIN — OXYCODONE HYDROCHLORIDE 5 MILLIGRAM(S): 5 TABLET ORAL at 09:59

## 2023-01-01 RX ADMIN — CHLORHEXIDINE GLUCONATE 1 APPLICATION(S): 213 SOLUTION TOPICAL at 11:29

## 2023-01-01 RX ADMIN — RASBURICASE 108 MILLIGRAM(S): KIT at 14:48

## 2023-01-01 RX ADMIN — OXYCODONE HYDROCHLORIDE 5 MILLIGRAM(S): 5 TABLET ORAL at 22:16

## 2023-01-01 RX ADMIN — PIPERACILLIN AND TAZOBACTAM 25 GRAM(S): 4; .5 INJECTION, POWDER, LYOPHILIZED, FOR SOLUTION INTRAVENOUS at 11:22

## 2023-01-01 RX ADMIN — OXYCODONE HYDROCHLORIDE 5 MILLIGRAM(S): 5 TABLET ORAL at 15:20

## 2023-01-01 RX ADMIN — Medication 3 MILLIGRAM(S): at 01:00

## 2023-01-01 RX ADMIN — HEPARIN SODIUM 1100 UNIT(S)/HR: 5000 INJECTION INTRAVENOUS; SUBCUTANEOUS at 07:09

## 2023-01-01 RX ADMIN — PHENYLEPHRINE HYDROCHLORIDE 16.9 MICROGRAM(S)/KG/MIN: 10 INJECTION INTRAVENOUS at 08:08

## 2023-01-01 RX ADMIN — PANTOPRAZOLE SODIUM 40 MILLIGRAM(S): 20 TABLET, DELAYED RELEASE ORAL at 06:18

## 2023-01-01 RX ADMIN — HEPARIN SODIUM 1100 UNIT(S)/HR: 5000 INJECTION INTRAVENOUS; SUBCUTANEOUS at 07:03

## 2023-01-01 RX ADMIN — SODIUM CHLORIDE 75 MILLILITER(S): 9 INJECTION INTRAMUSCULAR; INTRAVENOUS; SUBCUTANEOUS at 14:12

## 2023-01-01 RX ADMIN — SEVELAMER CARBONATE 800 MILLIGRAM(S): 2400 POWDER, FOR SUSPENSION ORAL at 05:40

## 2023-01-01 RX ADMIN — CHLORHEXIDINE GLUCONATE 15 MILLILITER(S): 213 SOLUTION TOPICAL at 17:22

## 2023-01-01 RX ADMIN — SODIUM CHLORIDE 1000 MILLILITER(S): 9 INJECTION, SOLUTION INTRAVENOUS at 21:15

## 2023-01-01 RX ADMIN — SERTRALINE 25 MILLIGRAM(S): 25 TABLET, FILM COATED ORAL at 13:24

## 2023-01-01 RX ADMIN — HEPARIN SODIUM 900 UNIT(S)/HR: 5000 INJECTION INTRAVENOUS; SUBCUTANEOUS at 11:05

## 2023-01-01 RX ADMIN — Medication 100 MILLIGRAM(S): at 17:34

## 2023-01-01 RX ADMIN — VASOPRESSIN 12 UNIT(S)/MIN: 20 INJECTION INTRAVENOUS at 23:33

## 2023-01-01 RX ADMIN — PIPERACILLIN AND TAZOBACTAM 25 GRAM(S): 4; .5 INJECTION, POWDER, LYOPHILIZED, FOR SOLUTION INTRAVENOUS at 00:33

## 2023-01-01 RX ADMIN — Medication 250 MILLIGRAM(S): at 10:37

## 2023-01-01 RX ADMIN — SODIUM ZIRCONIUM CYCLOSILICATE 10 GRAM(S): 10 POWDER, FOR SUSPENSION ORAL at 10:15

## 2023-01-01 RX ADMIN — SODIUM CHLORIDE 100 MILLILITER(S): 9 INJECTION INTRAMUSCULAR; INTRAVENOUS; SUBCUTANEOUS at 06:48

## 2023-01-01 RX ADMIN — SERTRALINE 25 MILLIGRAM(S): 25 TABLET, FILM COATED ORAL at 13:06

## 2023-01-01 RX ADMIN — SEVELAMER CARBONATE 800 MILLIGRAM(S): 2400 POWDER, FOR SUSPENSION ORAL at 05:02

## 2023-01-01 RX ADMIN — PANTOPRAZOLE SODIUM 40 MILLIGRAM(S): 20 TABLET, DELAYED RELEASE ORAL at 05:39

## 2023-01-01 RX ADMIN — HEPARIN SODIUM 1100 UNIT(S)/HR: 5000 INJECTION INTRAVENOUS; SUBCUTANEOUS at 07:39

## 2023-01-01 RX ADMIN — PHENYLEPHRINE HYDROCHLORIDE 16.9 MICROGRAM(S)/KG/MIN: 10 INJECTION INTRAVENOUS at 21:17

## 2023-01-01 RX ADMIN — HEPARIN SODIUM 1100 UNIT(S)/HR: 5000 INJECTION INTRAVENOUS; SUBCUTANEOUS at 18:21

## 2023-01-01 RX ADMIN — Medication 250 MILLIGRAM(S): at 21:17

## 2023-01-01 RX ADMIN — Medication 100 MILLIGRAM(S): at 11:27

## 2023-01-01 RX ADMIN — PANTOPRAZOLE SODIUM 40 MILLIGRAM(S): 20 TABLET, DELAYED RELEASE ORAL at 16:48

## 2023-01-01 RX ADMIN — Medication 4.22 MICROGRAM(S)/KG/MIN: at 21:17

## 2023-01-01 RX ADMIN — SEVELAMER CARBONATE 800 MILLIGRAM(S): 2400 POWDER, FOR SUSPENSION ORAL at 23:51

## 2023-01-01 RX ADMIN — SEVELAMER CARBONATE 800 MILLIGRAM(S): 2400 POWDER, FOR SUSPENSION ORAL at 21:04

## 2023-01-01 RX ADMIN — HYDROMORPHONE HYDROCHLORIDE 0.25 MILLIGRAM(S): 2 INJECTION INTRAMUSCULAR; INTRAVENOUS; SUBCUTANEOUS at 17:47

## 2023-01-01 RX ADMIN — Medication 100 MILLIGRAM(S): at 10:15

## 2023-01-01 RX ADMIN — Medication 50 MILLIGRAM(S): at 21:40

## 2023-01-01 RX ADMIN — Medication 4.22 MICROGRAM(S)/KG/MIN: at 08:09

## 2023-01-01 RX ADMIN — HEPARIN SODIUM 1100 UNIT(S)/HR: 5000 INJECTION INTRAVENOUS; SUBCUTANEOUS at 09:32

## 2023-01-01 RX ADMIN — HEPARIN SODIUM 1100 UNIT(S)/HR: 5000 INJECTION INTRAVENOUS; SUBCUTANEOUS at 19:21

## 2023-01-01 RX ADMIN — HEPARIN SODIUM 900 UNIT(S)/HR: 5000 INJECTION INTRAVENOUS; SUBCUTANEOUS at 03:39

## 2023-01-01 RX ADMIN — PHENYLEPHRINE HYDROCHLORIDE 16.9 MICROGRAM(S)/KG/MIN: 10 INJECTION INTRAVENOUS at 19:46

## 2023-01-01 RX ADMIN — Medication 1 APPLICATION(S): at 11:53

## 2023-01-01 RX ADMIN — APIXABAN 5 MILLIGRAM(S): 2.5 TABLET, FILM COATED ORAL at 17:05

## 2023-01-01 RX ADMIN — INSULIN HUMAN 10 UNIT(S): 100 INJECTION, SOLUTION SUBCUTANEOUS at 17:37

## 2023-01-01 RX ADMIN — CEFTRIAXONE 100 MILLIGRAM(S): 500 INJECTION, POWDER, FOR SOLUTION INTRAMUSCULAR; INTRAVENOUS at 13:02

## 2023-01-01 RX ADMIN — Medication 100 MILLIGRAM(S): at 09:31

## 2023-01-01 RX ADMIN — HEPARIN SODIUM 1100 UNIT(S)/HR: 5000 INJECTION INTRAVENOUS; SUBCUTANEOUS at 07:07

## 2023-01-01 RX ADMIN — HEPARIN SODIUM 1100 UNIT(S)/HR: 5000 INJECTION INTRAVENOUS; SUBCUTANEOUS at 19:13

## 2023-01-01 RX ADMIN — HEPARIN SODIUM 1100 UNIT(S)/HR: 5000 INJECTION INTRAVENOUS; SUBCUTANEOUS at 19:08

## 2023-01-01 RX ADMIN — HEPARIN SODIUM 1100 UNIT(S)/HR: 5000 INJECTION INTRAVENOUS; SUBCUTANEOUS at 02:17

## 2023-01-01 RX ADMIN — SODIUM CHLORIDE 125 MILLILITER(S): 9 INJECTION INTRAMUSCULAR; INTRAVENOUS; SUBCUTANEOUS at 05:40

## 2023-01-01 RX ADMIN — OXYCODONE HYDROCHLORIDE 5 MILLIGRAM(S): 5 TABLET ORAL at 17:25

## 2023-01-01 RX ADMIN — HEPARIN SODIUM 1600 UNIT(S)/HR: 5000 INJECTION INTRAVENOUS; SUBCUTANEOUS at 16:52

## 2023-01-01 RX ADMIN — Medication 100 MILLIGRAM(S): at 12:10

## 2023-01-01 RX ADMIN — OXYCODONE HYDROCHLORIDE 5 MILLIGRAM(S): 5 TABLET ORAL at 21:16

## 2023-01-01 RX ADMIN — PROPOFOL 16.2 MICROGRAM(S)/KG/MIN: 10 INJECTION, EMULSION INTRAVENOUS at 08:08

## 2023-01-01 RX ADMIN — PHENYLEPHRINE HYDROCHLORIDE 16.9 MICROGRAM(S)/KG/MIN: 10 INJECTION INTRAVENOUS at 17:06

## 2023-01-01 RX ADMIN — HEPARIN SODIUM 1100 UNIT(S)/HR: 5000 INJECTION INTRAVENOUS; SUBCUTANEOUS at 08:53

## 2023-01-01 RX ADMIN — Medication 4.22 MICROGRAM(S)/KG/MIN: at 22:45

## 2023-01-01 RX ADMIN — Medication 1 MILLIGRAM(S): at 10:26

## 2023-01-01 RX ADMIN — PIPERACILLIN AND TAZOBACTAM 25 GRAM(S): 4; .5 INJECTION, POWDER, LYOPHILIZED, FOR SOLUTION INTRAVENOUS at 17:22

## 2023-01-01 RX ADMIN — HEPARIN SODIUM 1100 UNIT(S)/HR: 5000 INJECTION INTRAVENOUS; SUBCUTANEOUS at 08:18

## 2023-01-01 RX ADMIN — HEPARIN SODIUM 1100 UNIT(S)/HR: 5000 INJECTION INTRAVENOUS; SUBCUTANEOUS at 03:33

## 2023-01-01 RX ADMIN — SERTRALINE 25 MILLIGRAM(S): 25 TABLET, FILM COATED ORAL at 12:10

## 2023-01-01 RX ADMIN — Medication 50 MILLIGRAM(S): at 23:25

## 2023-01-01 RX ADMIN — HEPARIN SODIUM 1100 UNIT(S)/HR: 5000 INJECTION INTRAVENOUS; SUBCUTANEOUS at 21:53

## 2023-01-01 RX ADMIN — HEPARIN SODIUM 1600 UNIT(S)/HR: 5000 INJECTION INTRAVENOUS; SUBCUTANEOUS at 23:58

## 2023-01-01 RX ADMIN — SEVELAMER CARBONATE 800 MILLIGRAM(S): 2400 POWDER, FOR SUSPENSION ORAL at 13:06

## 2023-01-01 RX ADMIN — PROPOFOL 16.2 MICROGRAM(S)/KG/MIN: 10 INJECTION, EMULSION INTRAVENOUS at 21:16

## 2023-01-01 RX ADMIN — PANTOPRAZOLE SODIUM 40 MILLIGRAM(S): 20 TABLET, DELAYED RELEASE ORAL at 05:52

## 2023-01-01 RX ADMIN — PANTOPRAZOLE SODIUM 40 MILLIGRAM(S): 20 TABLET, DELAYED RELEASE ORAL at 18:29

## 2023-01-01 RX ADMIN — HEPARIN SODIUM 900 UNIT(S)/HR: 5000 INJECTION INTRAVENOUS; SUBCUTANEOUS at 19:22

## 2023-01-01 RX ADMIN — HEPARIN SODIUM 1300 UNIT(S)/HR: 5000 INJECTION INTRAVENOUS; SUBCUTANEOUS at 07:23

## 2023-01-01 RX ADMIN — HEPARIN SODIUM 900 UNIT(S)/HR: 5000 INJECTION INTRAVENOUS; SUBCUTANEOUS at 07:16

## 2023-01-01 RX ADMIN — Medication 3 MILLIGRAM(S): at 21:16

## 2023-01-01 RX ADMIN — PANTOPRAZOLE SODIUM 40 MILLIGRAM(S): 20 TABLET, DELAYED RELEASE ORAL at 11:29

## 2023-01-01 RX ADMIN — PROPOFOL 16.2 MICROGRAM(S)/KG/MIN: 10 INJECTION, EMULSION INTRAVENOUS at 19:44

## 2023-01-01 RX ADMIN — HYDROMORPHONE HYDROCHLORIDE 0.25 MILLIGRAM(S): 2 INJECTION INTRAMUSCULAR; INTRAVENOUS; SUBCUTANEOUS at 18:11

## 2023-01-01 RX ADMIN — Medication 100 GRAM(S): at 15:49

## 2023-01-01 RX ADMIN — Medication 50 MILLIGRAM(S): at 21:16

## 2023-01-01 RX ADMIN — SEVELAMER CARBONATE 800 MILLIGRAM(S): 2400 POWDER, FOR SUSPENSION ORAL at 06:18

## 2023-01-01 RX ADMIN — SODIUM CHLORIDE 1000 MILLILITER(S): 9 INJECTION INTRAMUSCULAR; INTRAVENOUS; SUBCUTANEOUS at 11:36

## 2023-01-01 RX ADMIN — Medication 100 MILLIGRAM(S): at 13:24

## 2023-01-01 RX ADMIN — PANTOPRAZOLE SODIUM 40 MILLIGRAM(S): 20 TABLET, DELAYED RELEASE ORAL at 18:34

## 2023-01-01 RX ADMIN — Medication 8.44 MICROGRAM(S)/KG/MIN: at 19:44

## 2023-01-01 RX ADMIN — VASOPRESSIN 6 UNIT(S)/MIN: 20 INJECTION INTRAVENOUS at 21:17

## 2023-01-01 RX ADMIN — HEPARIN SODIUM 1100 UNIT(S)/HR: 5000 INJECTION INTRAVENOUS; SUBCUTANEOUS at 07:11

## 2023-01-01 RX ADMIN — HEPARIN SODIUM 0 UNIT(S)/HR: 5000 INJECTION INTRAVENOUS; SUBCUTANEOUS at 08:08

## 2023-01-01 RX ADMIN — SERTRALINE 25 MILLIGRAM(S): 25 TABLET, FILM COATED ORAL at 13:16

## 2023-01-01 RX ADMIN — OXYCODONE HYDROCHLORIDE 5 MILLIGRAM(S): 5 TABLET ORAL at 14:23

## 2023-01-01 RX ADMIN — SODIUM CHLORIDE 100 MILLILITER(S): 9 INJECTION INTRAMUSCULAR; INTRAVENOUS; SUBCUTANEOUS at 11:18

## 2023-01-01 RX ADMIN — SERTRALINE 25 MILLIGRAM(S): 25 TABLET, FILM COATED ORAL at 11:27

## 2023-01-01 RX ADMIN — OXYCODONE HYDROCHLORIDE 5 MILLIGRAM(S): 5 TABLET ORAL at 09:26

## 2023-01-01 RX ADMIN — HEPARIN SODIUM 900 UNIT(S)/HR: 5000 INJECTION INTRAVENOUS; SUBCUTANEOUS at 07:19

## 2023-01-01 RX ADMIN — Medication 100 MILLIGRAM(S): at 13:10

## 2023-01-01 RX ADMIN — Medication 50 MILLIEQUIVALENT(S): at 18:20

## 2023-01-01 RX ADMIN — SERTRALINE 25 MILLIGRAM(S): 25 TABLET, FILM COATED ORAL at 09:31

## 2023-01-01 RX ADMIN — Medication 200 GRAM(S): at 17:37

## 2023-01-01 RX ADMIN — Medication 50 MILLIGRAM(S): at 21:04

## 2023-01-01 RX ADMIN — PANTOPRAZOLE SODIUM 40 MILLIGRAM(S): 20 TABLET, DELAYED RELEASE ORAL at 05:23

## 2023-01-01 RX ADMIN — HEPARIN SODIUM 1100 UNIT(S)/HR: 5000 INJECTION INTRAVENOUS; SUBCUTANEOUS at 13:15

## 2023-01-01 RX ADMIN — Medication 50 MILLIEQUIVALENT(S): at 01:22

## 2023-01-01 RX ADMIN — SERTRALINE 25 MILLIGRAM(S): 25 TABLET, FILM COATED ORAL at 13:02

## 2023-01-01 RX ADMIN — SEVELAMER CARBONATE 800 MILLIGRAM(S): 2400 POWDER, FOR SUSPENSION ORAL at 05:23

## 2023-01-01 RX ADMIN — SODIUM ZIRCONIUM CYCLOSILICATE 10 GRAM(S): 10 POWDER, FOR SUSPENSION ORAL at 18:17

## 2023-01-01 RX ADMIN — Medication 50 MILLILITER(S): at 01:26

## 2023-01-01 RX ADMIN — SEVELAMER CARBONATE 800 MILLIGRAM(S): 2400 POWDER, FOR SUSPENSION ORAL at 13:16

## 2023-01-01 RX ADMIN — Medication 250 MILLIGRAM(S): at 18:28

## 2023-01-01 RX ADMIN — PANTOPRAZOLE SODIUM 40 MILLIGRAM(S): 20 TABLET, DELAYED RELEASE ORAL at 17:23

## 2023-01-01 RX ADMIN — HEPARIN SODIUM 1100 UNIT(S)/HR: 5000 INJECTION INTRAVENOUS; SUBCUTANEOUS at 08:59

## 2023-01-01 RX ADMIN — HEPARIN SODIUM 1600 UNIT(S)/HR: 5000 INJECTION INTRAVENOUS; SUBCUTANEOUS at 19:46

## 2023-01-01 RX ADMIN — PANTOPRAZOLE SODIUM 40 MILLIGRAM(S): 20 TABLET, DELAYED RELEASE ORAL at 17:34

## 2023-01-01 RX ADMIN — Medication 50 MILLIGRAM(S): at 21:39

## 2023-01-01 RX ADMIN — SERTRALINE 25 MILLIGRAM(S): 25 TABLET, FILM COATED ORAL at 10:15

## 2023-01-01 RX ADMIN — SERTRALINE 25 MILLIGRAM(S): 25 TABLET, FILM COATED ORAL at 17:39

## 2023-01-01 RX ADMIN — HEPARIN SODIUM 1400 UNIT(S)/HR: 5000 INJECTION INTRAVENOUS; SUBCUTANEOUS at 23:33

## 2023-01-01 RX ADMIN — Medication 50 MILLILITER(S): at 15:50

## 2023-01-01 RX ADMIN — SEVELAMER CARBONATE 800 MILLIGRAM(S): 2400 POWDER, FOR SUSPENSION ORAL at 14:53

## 2023-01-01 RX ADMIN — HEPARIN SODIUM 1600 UNIT(S)/HR: 5000 INJECTION INTRAVENOUS; SUBCUTANEOUS at 17:11

## 2023-01-01 RX ADMIN — Medication 50 MILLILITER(S): at 05:41

## 2023-01-01 RX ADMIN — RASBURICASE 108 MILLIGRAM(S): KIT at 01:00

## 2023-07-26 NOTE — ED PROVIDER NOTE - PATIENT PORTAL LINK FT
You can access the FollowMyHealth Patient Portal offered by [facility] by registering at the following website: [email or web address]. By joining [facility]’s FollowMyHealth portal, you will also be able to view your health information using other applications (apps) compatible with our system.

## 2023-07-26 NOTE — ED PROVIDER NOTE - ATTENDING CONTRIBUTION TO CARE
Attending note:   After face to face evaluation of this patient, I concur with above noted hx, pe, and care plan for this patient.  Caldera: 58-year-old female with history of hypertension.  Patient has been off medications for approximately 1 week.  Patient has been having dark stools intermittently for last 1 week.  Patient states that every third bowel movement has been black.  Patient has also been having some abdominal occasional distention with gassy.  Patient was seen by PMD ED and had positive guaiac stool.  Patient denies any alcohol use or NSAID use.  Patient has not had a colonoscopy or endoscopy ever.  Patient has no previous GI follow-up.  On exam patient's blood pressure is elevated.  Patient has good skin color no pallor noted.  Abdomen is soft with no significant tenderness.  Lungs are clear heart is regular.  Patient CBC hemoglobin is 11.5.  Given patient's very intermittent black stools and stable hemoglobin patient is a good candidate for outpatient follow-up.  Given black stools this is likely more upper GI and patient can be started on a PPI prior to GI follow-up.  Case was given to discharge lounge and patient to get follow-up with GI expedited hopefully.  Patient was also given blood pressure medication.  Patient's PMD did renew medications and patient has been waiting for her at the pharmacy.

## 2023-07-26 NOTE — ED PROVIDER NOTE - NSFOLLOWUPINSTRUCTIONS_ED_ALL_ED_FT
You were seen in the ER for change in stool color . Your blood work did not show any life-threatening findings  However your liver enzymes were slightly elevated. Take omeprazole 20mg each day for your symptoms. Follow up with a doctor ASAP (the hospital should call you for this appointment.  Follow up with your regular doctor within the next 1 week. Return to the ER for worsening symptoms, blood in stool, weakness, dizziness

## 2023-07-26 NOTE — ED PROVIDER NOTE - CLINICAL SUMMARY MEDICAL DECISION MAKING FREE TEXT BOX
58F w/HTN referred to ED for further work-up of GIB. Well appearing and normal vitals here. No risk factors for variceal bleeding. Will check basic labs including CBC - if patient does not have episode of bleeding in ED will refer for outpatient GI work-up

## 2023-07-26 NOTE — ED PROVIDER NOTE - NS ED ROS FT
CONSTITUTIONAL: no fever and no chills.    Eyes: No visual change  · ENMT: no dysphagia, no hoarseness and no throat pain.  · CARDIOVASCULAR: no chest pain and no edema.  · RESPIRATORY:  no cough, and no shortness of breath.  · GASTROINTESTINAL: + nausea and dark stools. No abdominal pain, no diarrhea  · GENITOURINARY: no dysuria, no frequency, and no hematuria.  · MUSCULOSKELETAL: no back pain, no gout, no musculoskeletal pain, no neck pain, and no weakness.    NEURO: No weakness, no numbness/tingling   · ROS STATEMENT: all other ROS negative except as per HPI

## 2023-07-26 NOTE — ED PROVIDER NOTE - OBJECTIVE STATEMENT
hx notable for HTN. Ran out of BP Rx and went to her PMD today. She mentioned weeks of abdominal bloating and dark colored stools. Her PMD obtained a stool sample that was occult+ for blood so she was sent to the ED for further work-up.     She has never had an EGD. No liver dx/etoh use. not on NSAIDs. During these symptoms she has had poor appetite - had 2 episodes of vomiting during the past 2 days - she feels this may be due to poor appetite. no

## 2023-07-26 NOTE — ED ADULT TRIAGE NOTE - CHIEF COMPLAINT QUOTE
Pt reports she has been having episodes of dark stool, and has lost about 22 pounds in the last 8 weeks. Saw PMD who advised her to come to ER for eval, "thinks I have an ulcer." Denies weakness, SOB, abd pain, denies AC use. Hx: HTN- reports did not take BP meds this morning

## 2023-07-26 NOTE — ED ADULT NURSE NOTE - NSFALLUNIVINTERV_ED_ALL_ED
Bed/Stretcher in lowest position, wheels locked, appropriate side rails in place/Call bell, personal items and telephone in reach/Instruct patient to call for assistance before getting out of bed/chair/stretcher/Non-slip footwear applied when patient is off stretcher/[place] to call system/Physically safe environment - no spills, clutter or unnecessary equipment/Purposeful proactive rounding/Room/bathroom lighting operational, light cord in reach

## 2023-07-26 NOTE — ED ADULT NURSE NOTE - NS ED NOTE ABUSE SUSPICION NEGLECT YN
RN went in to check on patient after a coughing fit during morning rounds/AM labs  Patient had tissues that contained sputum with blood tinge to them  Notified oncoming ROSAURA Zaragoza RN
No

## 2023-07-26 NOTE — ED ADULT NURSE NOTE - OBJECTIVE STATEMENT
Pt is alert and orientedx4, ambulatory at baseline. Pt presents to the ED with dark stools for 8 weeks and losing weight. Pt says she gets very bloated and has episodes of nausea and vomiting. Pt denies any GI pain. Pt went to her PCP who did a fecal occult and said it was positive. Pt sent to ER for GI evaluation and CBC. Pt denies chest pain, shortness of breath, dyspnea on exertion, breathing is unlabored and even. Pt denies nausea, vomiting or diarrhea. 20G IV placed in left AC labs drawn, awaiting further orders. Call bell within reach, bed in lowest position, will continue to monitor.

## 2023-07-26 NOTE — ED PROVIDER NOTE - PHYSICAL EXAMINATION
· CONSTITUTIONAL: Well appearing, awake, alert,  in no apparent distress.  · ENMT: Airway patent, tolerating secretions.  Nasal mucosa clear. Mouth with normal mucosa.  · EYES: Clear bilaterally, pupils equal, round  · CARDIAC: Warm, water-perfused  · RESPIRATORY: Equal Chest Rise, no stridor. No respiratory distress  · GASTROINTESTINAL: Non-distended. non-tender in all quadrants.  · MUSCULOSKELETAL: Spine appears normal, range of motion is not limited, no muscle or joint tenderness  · NEUROLOGICAL: Alert. Speech Fluent. Attends to conversation and exam  · SKIN:  No evidence of rash.  · PSYCHIATRIC: Normal affect. Cooperative with history and exam

## 2023-07-27 PROBLEM — I10 ESSENTIAL (PRIMARY) HYPERTENSION: Chronic | Status: ACTIVE | Noted: 2023-01-01

## 2023-07-27 PROBLEM — Z00.00 ENCOUNTER FOR PREVENTIVE HEALTH EXAMINATION: Status: ACTIVE | Noted: 2023-01-01

## 2023-08-01 PROBLEM — K21.9 GERD WITHOUT ESOPHAGITIS: Status: ACTIVE | Noted: 2023-01-01

## 2023-08-01 PROBLEM — R19.7 DIARRHEA, UNSPECIFIED TYPE: Status: ACTIVE | Noted: 2023-01-01

## 2023-08-01 PROBLEM — R63.4 WEIGHT LOSS, ABNORMAL: Status: ACTIVE | Noted: 2023-01-01

## 2023-08-01 PROBLEM — Z12.11 COLON CANCER SCREENING: Status: ACTIVE | Noted: 2023-01-01

## 2023-08-01 PROBLEM — K62.5 RECTAL BLEEDING: Status: ACTIVE | Noted: 2023-01-01

## 2023-08-01 PROBLEM — R11.2 NAUSEA AND VOMITING, UNSPECIFIED VOMITING TYPE: Status: ACTIVE | Noted: 2023-01-01

## 2023-08-01 PROBLEM — R10.13 DYSPEPSIA: Status: ACTIVE | Noted: 2023-01-01

## 2023-08-01 PROBLEM — R10.84 GENERALIZED ABDOMINAL PAIN: Status: ACTIVE | Noted: 2023-01-01

## 2023-08-01 NOTE — ASSESSMENT
[FreeTextEntry1] : Abdominal Pain: The patient complains of abdominal pain. The patient is to avoid nonsteroidal anti-inflammatory drugs and aspirin.  I recommend a low FOD-MAP diet.  I recommend a trial of Dicyclomine 10 mg tablet PO 3 times a day PRN for the abdominal pain. Dyspepsia: The patient complains of dyspeptic symptoms. The patient was advised to follow a low FODMAP diet.  The patient was given a pamphlet regarding a low FODMAP diet.  The patient and I discussed the low FODMAP diet at length. I recommend a trial of simethicone 1 tablet 4 times a day as needed abdominal pain and bloating. GERD: The patient complains of reflux symptoms.  The patient was given a pamphlet for anti-reflux diet. The patient was advised to avoid late-night meals and dietary indiscretions.  The patient was advised to avoid fried and fatty foods.  The patient was advised to abide by an anti-GERD diet. The patient was given a pamphlet for anti-GERD.  The patient and I reviewed the anti-GERD diet at length.  I recommend start on Pantoprazole 40 mg once a day x 3 months. Nausea/Vomiting: The patient complains of nausea/vomiting. If the symptoms of nausea/vomiting persists, the patient may require a trial of Zofran 4 mg twice a day.  Diarrhea: The patient complains of diarrhea.  I recommend a low residue diet. The patient is to avoid fiber supplementation. The patient is to consider starting a trial of a probiotic such as Align once a day.   I recommend sending stool studies for C+S, O+P x3, and C. difficile to assess for an infectious etiology of the diarrhea.  The symptoms are worse after meals.  The patient has a history of cholecystectomy.  I recommend a trial of cholestyramine one packet once a day for possible bile induced diarrhea. If the symptoms persist, the patient may require a colonoscopy to assess for colitis versus other causes.  The patient was told of the risks and benefits of the procedure.  The patient was told of the risks of perforation, emergency surgery, bleeding, infections and missed lesions.  The patient agreed and will follow-up to reassess the symptoms.   Weight Loss: The patient complains of weight loss and anorexia.  The patient admits to losing 22 pounds over the past 10 weeks. The patient attributes the weight loss to change in diet and loss of appetite.  Rectal Bleeding: The patient had episodes of rectal bleeding.  The etiology of the rectal bleeding is unclear.  I recommend a trial of Anusol HC suppositories one per rectum QHS and Anusol HC 2.5% cream apply to affected area twice a day PRN hemorrhoidal bleeding or pain.  I recommend a trial of Calmoseptine cream apply to affected area twice a day for rectal discomfort. I recommend Tucks pads for the hemorrhoids.  I recommend starting Sitz baths twice a day for the hemorrhoids.  I recommend avoid wearing tight underwear and use boxers. I recommend avoid touching the perineal area. I recommend a colonoscopy to assess the site of bleeding. The patient was told of the risks and benefits of the procedure.  The patient was told of the risks of perforation, emergency surgery, bleeding, infections and missed lesions.  The patient agreed and will schedule for the procedure. The patient is to be n.p.o. after midnight and bowel prep was given.  The patient is to return for the procedure. Elevated Liver Enzymes: The patient has elevated liver enzymes noted on prior blood work of unclear etiology. The patient denies any jaundice or pruritus.  The patient denies any alcohol use.  The patient denies taking large doses of nonsteroidal anti-inflammatory drugs or acetaminophen.  I had a long discussion with the patient regarding the elevated liver enzymes and the possible progression of the liver disease to cirrhosis.  The patient was told of the possible increased risk of developing liver failure, cirrhosis, ascites, GI bleeding secondary to varices, hepatic encephalopathy, bleeding tendencies and liver cancer.  The patient was told of the importance of follow-up.  The patient was advised to follow up every 6 months for blood work and imaging studies.  I recommend avoid alcohol and hepato-toxic agents.  I recommend avoiding large doses of nonsteroidal anti-inflammatory drugs or acetaminophen.   If the liver enzymes remain elevated, the patient may require a CT guided liver biopsy to assess the liver parenchyma and for possible treatment.  We had a long discussion regarding the risks and benefits of the procedure.  The patient was told of the risks of bleeding, perforation, infections, emergency surgery and missing lesions.  The patient agreed and will follow-up to reassess the symptoms. Colon Cancer screening: I recommend colonoscopy for colon cancer screening over the age of 45 to assess for colonic polyps. The patient was told of the risks and benefits of the procedure.  The patient was told of the risks of perforation, emergency surgery, bleeding, infections and missed lesions. The patient is to be on a clear liquid diet the entire day prior to the procedure. The patient is to complete the entire prescribed bowel prep the day prior to the procedure as directed.   The patient is told not to drive, drink alcohol, use recreational drugs, exercise, or work the day of the procedure.  The patient was told of the need for an escort to accompany the patient home after the procedure. The patient is aware that the procedure may be cancelled if they fail to follow the directions.  The patient agreed and will schedule for the procedure. The patient can take the antihypertensive medication with a sip of water one hour prior to the procedure. The patient is to be n.p.o. after midnight and bowel prep was given.  The patient is to return for the procedure. Colonoscopy: I recommend a colonoscopy to assess the symptoms and for colonic polyps.  The patient was told of the risks and benefits of the procedure.  The patient was told of the risks of perforation, emergency surgery, bleeding, infections and missed lesions.  The patient is to be on a clear liquid diet the entire day prior to the procedure. The patient is to complete the entire prescribed bowel prep the day prior to the procedure as directed. The patient is told not to drive, drink alcohol, use recreational drugs, exercise, or work the day of the procedure.  The patient was told of the need for an escort to accompany the patient home after the procedure. The patient is aware that the procedure may be cancelled if they fail to follow the directions.  The patient agreed and will schedule for the procedure. The patient can take the antihypertensive medication with a sip of water one hour prior to the procedure. The patient is to be n.p.o. after midnight and bowel prep was given.  The patient is to return for the procedure. Upper Endoscopy: I recommend an upper endoscopy to assess for peptic ulcer disease versus esophagitis.  The patient was told of the risks and benefits of the procedure.  The patient was told of the risks of perforation, emergency surgery, bleeding, infections and missed lesions. The patient is told not to drive, drink alcohol, use recreational drugs, exercise, or work the day of the procedure.  The patient was told of the need for an escort to accompany the patient home after the procedure. The patient is aware that the procedure may be cancelled if they fail to follow the directions.  The patient agreed and will schedule for the procedure. The patient can take the antihypertensive medication with a sip of water one hour prior to the procedure. The patient is to be n.p.o. after midnight.  The patient is to return for the procedure. Follow-up: The patient is to follow-up in the office in 4 weeks to reassess the symptoms. The patient was told to call the office if any further problems.

## 2023-08-01 NOTE — PHYSICAL EXAM
[Bowel Sounds] : normal bowel sounds [Epigastric] : epigastric [No CVA Tenderness] : no CVA  tenderness [Abnormal Walk] : normal gait [Normal Color / Pigmentation] : normal skin color and pigmentation [Sensation] : the sensory exam was normal to light touch and pinprick [Motor Exam] : the motor exam was normal [Normal] : oriented to person, place, and time [de-identified] : FROM in allextremities [de-identified] : not done

## 2023-08-01 NOTE — REVIEW OF SYSTEMS
[Abdominal Pain] : abdominal pain [Diarrhea] : diarrhea [Heartburn] : heartburn [Bleeding] : bleeding [Bloating (gassiness)] : bloating [Negative] : Heme/Lymph

## 2023-08-01 NOTE — HISTORY OF PRESENT ILLNESS
[FreeTextEntry1] : The patient is a 58-year-old female with past medical history significant for hypertension who was referred to my office by Dr. Rashad Riddle for abdominal pain, dyspepsia, gastroesophageal reflux disease, and nausea/vomiting. The patient also admits to having diarrhea, change in bowel habits, change in caliber of stool and rectal bleeding.  The patient complains of weight loss I was asked to render an opinion for consultation for the above complaints.   The patient states that she is feeling uncomfortable x 2 months.  The patient was recently evaluated at the [facility] emergency room on 2023 for occult blood in the stool.  The patient had blood work to assess the symptoms.  The blood work performed on 2023 revealed an elevated liver enzymes revealed an elevated WBC count of 12.27 K/UL, no evidence of anemia with a hemoglobin/hematocrit level of 11.5/36.7, respectively, elevated liver enzymes with a total bilirubin of 0.2 mg/dL, and elevated alkaline phosphatase/AST/ALT of 204/60/56 U/L, respectively, and elevated blood glucose of 133 mg/dL, and elevated anion gap of 15 mmol/L.  The patient was discharged in stable condition and advised to follow-up in the office for further work-up and treatment.  The stool guaiac was positive for occult blood in the stool.  I reviewed the blood work and imaging studies performed in the emergency room.  The patient complains of occasional abdominal pain.  The patient describes the abdominal pain as a sharp, intermittent left lower quadrant discomfort that is nonradiating in nature.  The abdominal pain is unrelated to meals or passing gas or having bowel movements.  The abdominal pain is described as mild in nature.  The abdominal pain occurs at night and in the morning.  The abdominal pain can occur at any time.   The abdominal pain never has awakened the patient from sleep.  The abdominal pain is not relieved with any medications.  The abdominal pain is associated with abdominal gas and bloating.  The patient complains of nausea and vomiting.  The patient complains of gastroesophageal reflux disease but denies any dysphagia. The gastroesophageal reflux disease is worse after meals and late at night and in the early morning.  The gastroesophageal reflux disease is slightly improved with proton pump inhibitors, H2 blockers and antacids.  The patient denies any atypical chest pain, shortness of breath or palpitations.  The patient denies any diaphoresis. The patient complains of occasional diarrhea but denies any constipation.  The patient has 2 to 3 bowel movements a day. The diarrhea is described as soft in nature.   The patient complains of a change in bowel habits.  The patient complains of a change in caliber of stool.   The patient admits to having mucus discharge with the bowel movements.  The patient complains of rectal bleeding but denies any melena or hematemesis.  The rectal bleeding is associated with diarrhea and internal hemorrhoids.  The patient denies any rectal pain or rectal pruritus. The patient complains of weight loss and anorexia.  The patient admits to losing 22 pounds over the past 10 weeks. The patient attributes the weight loss to change in diet and loss of appetite. She denies any fevers or chills.  The patient denies any jaundice or pruritus.  The patient denies any back pain.  The patient denies ever having a prior upper endoscopy and colonoscopy performed by another gastroenterologist.  The patient's last menstrual period was age 51. The patient is a .  The patient's first menstrual period was at age 13. The patient denies any significant family history of GI problems.    (-) smoking, (-) ETOH, (-) IVDA

## 2023-09-06 NOTE — ED ADULT NURSE NOTE - OBJECTIVE STATEMENT
Patient alert and oriented x 4 presenting to the ED with c/o of weakness and diarrhea x 1.5 months. The patient verbalized not being able to keep any food down for several weeks. Patient denies fever, chills, taking antibiotics or changing diet. Patient stated " I've lost nearly 30 pounds since July 2023 ". Patient denies abdominal pain. Patient verbalized her stool has been brown in color and denies dark color stool. Patient has tried to control diarrhea with Imodium and  Pepto Bismol but has been unsuccessful.  Iv#20 placed in right antecubital. PADDY ARITA

## 2023-09-06 NOTE — ED ADULT NURSE REASSESSMENT NOTE - NS ED NURSE REASSESS COMMENT FT1
Received report form CAROLEE Felton. Pt is A&Ox4, breathing even and unlabored. NAD. No complaints of chest pain, headache, nausea, dizziness, vomiting  SOB, fever, or chills

## 2023-09-06 NOTE — H&P ADULT - PROBLEM SELECTOR PLAN 5
Pt w/ BLE DVTs on ultrasound. Currently no respiratory symptoms c/f PE  -c/w Heparin gtt iso acute renal failure     -If renal function improves, can transition to lovenox vs DOAC  -If new respiratory symptoms, consider CT angio, echocardiogram Pt w/ BLE DVTs on ultrasound. Currently no respiratory symptoms c/f PE  -c/w Heparin gtt full a/c iso acute renal failure     -If renal function normalizes, consider transition to Lovenox vs DOAC  -If new respiratory symptoms, consider V/Q scan

## 2023-09-06 NOTE — H&P ADULT - PROBLEM SELECTOR PLAN 9
Pt on 3-drug regimen: Amlodipine 10, Lisinopril 40, and Metoprolol 25  -Holding all iso acute illness, restart if persistently hypertensive

## 2023-09-06 NOTE — ED ADULT NURSE NOTE - NSFALLUNIVINTERV_ED_ALL_ED
Bed/Stretcher in lowest position, wheels locked, appropriate side rails in place/Call bell, personal items and telephone in reach/Instruct patient to call for assistance before getting out of bed/chair/stretcher/Non-slip footwear applied when patient is off stretcher/[place] to call system/Physically safe environment - no spills, clutter or unnecessary equipment/Purposeful proactive rounding/Room/bathroom lighting operational, light cord in reach

## 2023-09-06 NOTE — H&P ADULT - NSHPPHYSICALEXAM_GEN_ALL_CORE
T(C): 36.4 (09-06-23 @ 19:24), Max: 36.4 (09-06-23 @ 10:10)  HR: 95 (09-06-23 @ 19:24) (85 - 95)  BP: 103/60 (09-06-23 @ 19:24) (103/60 - 121/49)  RR: 18 (09-06-23 @ 19:24) (18 - 18)  SpO2: 99% (09-06-23 @ 19:24) (99% - 99%)    General: Well-groomed, NAD, laying in bed, on RA  HEENT: PERRLA, EOMI, non-icteric  Neck:  symmetric,  JVD absent  Respiratory: Clear to ascultation bilaterally, no crackles/rales, no Resp distress; no accessory muscle use  Cardiovascular:  Tachycardic, RRR, no murmurs/rubs/gallops  Abdomen: Slight firmness in epigastrium, NT, ND  Extremities: Significant non-pitting edema in LLE to the knee  Skin: No rashes or lesions noted  Neurological: Sensation grossly intact  Psychiatry: AOx3, appropriate insight/judgement, appropriate affect, recent/remote memory intact Vital Signs Last 24 Hrs  T(C): 36.4 (06 Sep 2023 21:33), Max: 36.4 (06 Sep 2023 10:10)  T(F): 97.5 (06 Sep 2023 21:33), Max: 97.5 (06 Sep 2023 10:10)  HR: 89 (06 Sep 2023 21:33) (85 - 95)  BP: 114/66 (06 Sep 2023 21:33) (103/60 - 121/49)  BP(mean): --  RR: 19 (06 Sep 2023 21:33) (18 - 19)  SpO2: 98% (06 Sep 2023 21:33) (98% - 99%)    Parameters below as of 06 Sep 2023 21:33  Patient On (Oxygen Delivery Method): room air    General: Well-groomed, NAD, laying in bed, on RA  HEENT: PERRLA, EOMI, non-icteric  Neck:  symmetric,  JVD absent  Respiratory: Clear to ascultation bilaterally, no crackles/rales, no resp distress; no accessory muscle use  Cardiovascular:  Tachycardic, RRR, no murmurs/rubs/gallops  Abdomen: slight firmness in epigastrium, NT, ND  Extremities: non-pitting edema in LLE to the knee  Skin: No rashes or lesions noted  Neurological: Sensation grossly intact  Psychiatry: AOx3, appropriate insight/judgement, appropriate affect, recent/remote memory intact

## 2023-09-06 NOTE — H&P ADULT - PROBLEM SELECTOR PLAN 8
Pt with Hgb 10, down from 11.5 on 07/26. Likely 2/2 GI bleed given hx of positive FOBT, colonic invasion by malignancy, and dark stools  -ctm  -maintain active T&S  -transfuse for Hgb<7

## 2023-09-06 NOTE — H&P ADULT - PROBLEM SELECTOR PLAN 6
WBC 21.55, up from 12.27 on 07/26. No bands, but ANC doubled over this timeframe 9.63->17.80. Pt without other infectious symptoms, so likely reactive iso DVT + cancer + severe dehydration  -ctm WBC 21.55, up from 12.27 on 07/26. No bands, but ANC doubled over this timeframe 9.63->17.80. Pt without other infectious symptoms, so likely reactive iso DVT + cancer + severe dehydration, but need to r/o infectious process  -f/u BCx/UCx/UA  -f/u CXR  -q4h VS

## 2023-09-06 NOTE — H&P ADULT - HISTORY OF PRESENT ILLNESS
58F w/ pmh HTN who is p/w several months of worsening diarrhea and weight loss. Pt was seen in ED ~1 month ago for +FOBT, n/v, and occasional diarrhea. Labs were grossly wnl, so pt dc'd with omeprazole and GI f/u for EGD/colonoscopy. Since then n/v have resolved, but pt continues to have ~6 episodes of diarrhea w/ mucous every day, and she has noticed the color of her stool becoming progressively darker. This has been accompanied by anorexia, severe fatigue, and progressive non-painful swelling of LLE. Over the past several months she has lost ~30lb (10-15lb in the last month alone) and occasional night sweats. Denies fever/chills, headache, chest/abdominal pain, dyspnea, cough, dizziness, 58F w/ pmh HTN who is p/w several months of worsening diarrhea and weight loss. Pt was seen in ED ~1 month ago for +FOBT, n/v, and occasional diarrhea. Labs were grossly wnl, so pt dc'd with omeprazole and GI f/u for EGD/colonoscopy. Since then n/v have resolved, but pt continues to have ~6 episodes of diarrhea w/ mucous every day, and she has noticed the color of her stool becoming progressively darker. This has been accompanied by anorexia, severe fatigue, and progressive non-painful swelling of LLE. Over the past several months she has lost ~30lb (10-15lb in the last month alone) and occasional night sweats. Denies fever/chills, headache, chest/abdominal pain, dyspnea, cough, dizziness, changes in urination, dysuria. 57yo Female w/ mhx HTN who is p/w several months of worsening diarrhea and weight loss. Pt was seen in ED ~1 month ago for +FOBT, n/v, and occasional diarrhea. Labs were grossly wnl, so pt dc'd with omeprazole and GI f/u for EGD/colonoscopy. Since then n/v have resolved, but pt continues to have ~6 episodes of diarrhea w/ mucous every day, and she has noticed the color of her stool becoming progressively darker. This has been accompanied by anorexia, severe fatigue, and progressive non-painful swelling of LLE. Over the past several months she has lost ~30lb (10-15lb in the last month alone) and occasional night sweats. Denies fever/chills, headache, chest/abdominal pain, dyspnea, cough, dizziness, changes in urination, dysuria. 59yo Female w/ mhx HTN who is p/w several months of worsening diarrhea and weight loss. Pt was seen in ED ~1 month ago for +FOBT, n/v, and occasional diarrhea. Labs were grossly wnl, so pt dc'd with omeprazole and GI f/u for EGD/colonoscopy. Since then n/v have resolved, but pt continues to have ~6 episodes of diarrhea w/ mucous every day, and she has noticed the color of her stool becoming progressively darker. This has been accompanied by anorexia, severe fatigue, and progressive non-painful swelling of LLE. Over the past several months she has lost ~30lb (10-15lb in the last month alone) and occasional night sweats. Reports no fever, chills, headache, chest/abdominal pain, dyspnea, cough, dizziness, changes in urination, dysuria.

## 2023-09-06 NOTE — H&P ADULT - NSHPLABSRESULTS_GEN_ALL_CORE
LABS:             10.0  21.55 )-----------( 171    ( 09-06-23 @ 11:14 )             30.7      131  |  94  |  91  -------------------------<  125    ( 09-06-23 @ 14:26 )  4.7   |  17  |  4.66    131  |  93  |  91  -------------------------<  130    ( 09-06-23 @ 11:14 )  4.7   |  17  |  4.70      Calcium: 8.3 mg/dL (09-06-23 @ 14:26)  Magnesium: 3.10 mg/dL (09-06-23 @ 14:26)  Phosphorus: 7.1 mg/dL (09-06-23 @ 14:26)  Calcium: 8.6 mg/dL (09-06-23 @ 11:14)  Magnesium: 3.10 mg/dL (09-06-23 @ 11:14)  Phosphorus: 7.0 mg/dL (09-06-23 @ 11:14)    TPro  7.0  /  Alb  2.8  /  TBili  0.6  /  DBili  --  /  AST  110  /  ALT  43  /  AlkPhos  547    ( 09-06-23 @ 14:26 )  TPro  7.3  /  Alb  3.0  /  TBili  0.6  /  DBili  --  /  AST  115  /  ALT  44  /  AlkPhos  562    ( 09-06-23 @ 11:14 )    Blood Gas Profile - Venous (09.06.23 @ 11:14)    pH, Venous: 7.33: Due to specimen delivery delays, please interpret with caution    pCO2, Venous: 34 mmHg    pO2, Venous: 40 mmHg    HCO3, Venous: 18 mmol/L    Base Excess, Venous: -7.2 mmol/L    Oxygen Saturation, Venous: 54.0 %    Total CO2, Venous: 18.9 mmol/L      IMAGING:  ACC: [id number] EXAM:  XR ANKLE COMP MIN 3 VIEWS LT   ORDERED BY: NELIDA CAMPOS   PROCEDURE DATE:  09/06/2023    IMPRESSION:  No acute fracture or dislocation.  --- End of Report ---    ACC: [id number] EXAM:  US DPLX LWR EXT VEINS COMPL BI   ORDERED BY: NELIDA CAMPOS   PROCEDURE DATE:  09/06/2023    IMPRESSION:  -Acute deep venous thrombosis: above and below the knee.  -Acute deep venous thrombosis in the right popliteal and posterior tibial veins.  -Acute deep venous thrombosis extending from the left femoral vein through the popliteal and calf veins.  -Findings were discussed with Dr. BISWAS 9/6/2023 1:26 PM by Dr. Schaeffer with read back confirmation.  --- End of Report ---    ACC: [id number] EXAM:  CT ABDOMEN AND PELVIS   ORDERED BY: ABBY HEREDIA   PROCEDURE DATE:  09/06/2023    IMPRESSION:  -Limited noncontrast study.  -CT evidence of metastatic disease characterized by multiple bilobar liver lesions and peritoneal carcinomatosis/malignant ascites. Unknown primary. Several areas of colonic wall thickening, question serosal implants involving those organs versus primary neoplasm.  -Fissural nodularity and subpleural and tubular nodules at the visualized lung bases, nonspecific.  -Findings were discussed with CRISTOPHER Garcia 9/6/2023 2:09 PM by Dr. Rincon with repeat back confirmation.  --- End of Report --- .      -Personally interpreted EKG: nsr 91bpm, qtc 469, no acute Tw or ST changes, no PACs or PVCs     -Personally reviewed the following labs below:               10.0  21.55 )-----------( 171    ( 09-06-23 @ 11:14 )             30.7      131  |  94  |  91  -------------------------<  125    ( 09-06-23 @ 14:26 )  4.7   |  17  |  4.66    131  |  93  |  91  -------------------------<  130    ( 09-06-23 @ 11:14 )  4.7   |  17  |  4.70      Calcium: 8.3 mg/dL (09-06-23 @ 14:26)  Magnesium: 3.10 mg/dL (09-06-23 @ 14:26)  Phosphorus: 7.1 mg/dL (09-06-23 @ 14:26)  Calcium: 8.6 mg/dL (09-06-23 @ 11:14)  Magnesium: 3.10 mg/dL (09-06-23 @ 11:14)  Phosphorus: 7.0 mg/dL (09-06-23 @ 11:14)    TPro  7.0  /  Alb  2.8  /  TBili  0.6  /  DBili  --  /  AST  110  /  ALT  43  /  AlkPhos  547    ( 09-06-23 @ 14:26 )  TPro  7.3  /  Alb  3.0  /  TBili  0.6  /  DBili  --  /  AST  115  /  ALT  44  /  AlkPhos  562    ( 09-06-23 @ 11:14 )    Blood Gas Profile - Venous (09.06.23 @ 11:14)    pH, Venous: 7.33: Due to specimen delivery delays, please interpret with caution    pCO2, Venous: 34 mmHg    pO2, Venous: 40 mmHg    HCO3, Venous: 18 mmol/L    Base Excess, Venous: -7.2 mmol/L    Oxygen Saturation, Venous: 54.0 %    Total CO2, Venous: 18.9 mmol/L        -Personally reviewed: XR ANKLE COMP MIN 3 VIEWS LT   ORDERED BY: NELIDA CAMPOS, PROCEDURE DATE:  09/06/2023    IMPRESSION:  No acute fracture or dislocation.  --- End of Report ---    -Personally reviewed: US DPLX LWR EXT VEINS COMPL BI   ORDERED BY: NELIDA CAMPOS   PROCEDURE DATE:  09/06/2023      IMPRESSION:  -Acute deep venous thrombosis: above and below the knee.  -Acute deep venous thrombosis in the right popliteal and posterior tibial veins.  -Acute deep venous thrombosis extending from the left femoral vein through the popliteal and calf veins.      -Personally reviewed: CT ABDOMEN AND PELVIS   ORDERED BY: ABBY HEREDIA   PROCEDURE DATE:  09/06/2023      IMPRESSION:  -Limited noncontrast study.  -CT evidence of metastatic disease characterized by multiple bilobar liver lesions and peritoneal carcinomatosis/malignant ascites. Unknown primary. Several areas of colonic wall thickening, question serosal implants involving those organs versus primary neoplasm.  -Fissural nodularity and subpleural and tubular nodules at the visualized lung bases, nonspecific.    --- End of Report --- .    -Personally interpreted EKG: NSR 91bpm, qtc 469, no acute Tw or ST changes, no PACs or PVCs     -Personally reviewed the following labs below:               10.0  21.55 )-----------( 171    ( 09-06-23 @ 11:14 )             30.7      131  |  94  |  91  -------------------------<  125    ( 09-06-23 @ 14:26 )  4.7   |  17  |  4.66    131  |  93  |  91  -------------------------<  130    ( 09-06-23 @ 11:14 )  4.7   |  17  |  4.70      Calcium: 8.3 mg/dL (09-06-23 @ 14:26)  Magnesium: 3.10 mg/dL (09-06-23 @ 14:26)  Phosphorus: 7.1 mg/dL (09-06-23 @ 14:26)  Calcium: 8.6 mg/dL (09-06-23 @ 11:14)  Magnesium: 3.10 mg/dL (09-06-23 @ 11:14)  Phosphorus: 7.0 mg/dL (09-06-23 @ 11:14)    TPro  7.0  /  Alb  2.8  /  TBili  0.6  /  DBili  --  /  AST  110  /  ALT  43  /  AlkPhos  547    ( 09-06-23 @ 14:26 )  TPro  7.3  /  Alb  3.0  /  TBili  0.6  /  DBili  --  /  AST  115  /  ALT  44  /  AlkPhos  562    ( 09-06-23 @ 11:14 )    Blood Gas Profile - Venous (09.06.23 @ 11:14)    pH, Venous: 7.33: Due to specimen delivery delays, please interpret with caution    pCO2, Venous: 34 mmHg    pO2, Venous: 40 mmHg    HCO3, Venous: 18 mmol/L    Base Excess, Venous: -7.2 mmol/L    Oxygen Saturation, Venous: 54.0 %    Total CO2, Venous: 18.9 mmol/L      -Personally reviewed: XR ANKLE COMP MIN 3 VIEWS LT   ORDERED BY: NELIDA CAMPOS, PROCEDURE DATE:  09/06/2023    IMPRESSION:  No acute fracture or dislocation.      -Personally reviewed: US DPLX LWR EXT VEINS COMPL BI   ORDERED BY: NELIDA CAMPOS   PROCEDURE DATE:  09/06/2023      IMPRESSION:  -Acute deep venous thrombosis: above and below the knee.  -Acute deep venous thrombosis in the right popliteal and posterior tibial veins.  -Acute deep venous thrombosis extending from the left femoral vein through the popliteal and calf veins.      -Personally reviewed: CT ABDOMEN AND PELVIS   ORDERED BY: ABBY HEREDIA   PROCEDURE DATE:  09/06/2023      IMPRESSION:  -Limited noncontrast study.  -CT evidence of metastatic disease characterized by multiple bilobar liver lesions and peritoneal carcinomatosis/malignant ascites. Unknown primary. Several areas of colonic wall thickening, question serosal implants involving those organs versus primary neoplasm.  -Fissural nodularity and subpleural and tubular nodules at the visualized lung bases, nonspecific.    --- End of Report ---

## 2023-09-06 NOTE — H&P ADULT - PROBLEM SELECTOR PLAN 4
Pt with hyponatremia, hypochloremia, hypocalcemia, hypermagnesemia, and marked hyperphosphatemia. Also with decreased HCO3 and elevated AG, but without acidosis on VBG. Likely 2/2 combination of diarrhea, poor PO intake, and kidney/liver dysfunction. Also concerning for tumor lysis syndrome  -f/u Uric acid     -initiate Rasburicase if >8  -f/u repeat CMP after 2nd liter of LR  -If MESHA and electrolyte abnormalities persistent, start Sevelamer for hyperphosphatemia Pt with hyponatremia, hypochloremia, hypocalcemia, hypermagnesemia, and marked hyperphosphatemia. Also with decreased HCO3 and elevated AG, but without acidosis on VBG. Likely 2/2 combination of diarrhea, poor PO intake, and kidney/liver dysfunction. Also concerning for tumor lysis syndrome  -Uric Acid 22.2, Phos 7.0, K 4.7     -c/f TLS; K may be falsely low iso diarrhea     -Per Onc, more likely 2/2 kidney dysfunction     -Rasburicase 8 x1, Sevelamer tid  -f/u repeat CMP  -Monitor on telemetry  -q4h VS Pt with hyponatremia, hypochloremia, hypocalcemia, hypermagnesemia, and marked hyperphosphatemia. Also with decreased HCO3 and elevated AG, but without acidosis on VBG. Likely 2/2 combination of diarrhea, poor PO intake, and kidney/liver dysfunction. Also concerning for tumor lysis syndrome  -Uric Acid 22.2, Phos 7.0, K 4.7     -c/f TLS; K may be falsely low iso diarrhea     -Per Onc, more likely 2/2 kidney dysfunction     -Rasburicase 6mg x1, Sevelamer tid     -Heme/Onc note and recs reviewed;   -f/u repeat CMP  -Monitor on telemetry  -q4h VS

## 2023-09-06 NOTE — H&P ADULT - ASSESSMENT
58F w/ pmh HTN who is p/w several months of worsening diarrhea and weight loss, found to have imaging concerning for cancer of unknown primary with omental carcinomatosis and liver metastases. Pt additionally found to have BLE DVTs, leukocytosis, multiple electrolyte abnormalities, and severe nereida vs acute renal failure. 57yo Female w/ MHx of HTN a/w MESHA, severe uremia and acute DVT i/s/o newly found carcinomatosis with liver metastases; 57yo Female w/ MHx of HTN a/w MESHA, severe uremia and acute DVT i/s/o newly found carcinomatosis with liver metastases; High c/f  and treated as TLS i/s/o severe electrolytes imbalances as per Heme/Onc evaluation;

## 2023-09-06 NOTE — ED PROVIDER NOTE - ATTENDING APP SHARED VISIT CONTRIBUTION OF CARE
I have personally performed a history and physical examination of the patient and discussed management with the ROSSI as well as the patient.  I reviewed the ROSSI's note and agree with the documented findings and plan of care.  I have authored and modified critical sections of the Provider Note, including but not limited to HPI, Physical Exam and MDM.    HPI: 58-year-old female past medical history of hypertension, currently being evaluated evaluated by GI for PUD with upcoming EGD EGD and colonoscopy in 11/2023 and seen here on independent chart review on 7/26/2023 for similar symptoms presents complaining of persistent diarrhea for the last month worse over the shoulder last 10 days.  Denies any abdominal pain.  Endorses intermittent nausea not present at this time.  Denies recent hospitalization, alcohol use, recent antibiotic use, known sick contacts, fever, chills.  No relief with Pepto-Bismol or Imodium with Pepto-Bismol or Imodium.  Of note patient states she of note patient states she has been feeling weak which caused her to roll her ankle several days ago.  Denies head trauma or his head trauma or LOC.  No other current complaints at this time.    ROS:   General: No fever, no chills, no malaise, no fatigue  ENT: No earache, no coryza, no sore throat  Neck: No stiffness, no swollen glands, no dysphagia  Respiratory: No cough, no dyspnea, no pleuritic chest pain  Cardiac: no chest pain, no palpitations, no edema, no jvd  Abdomen: See HPI  : No dysuria, no increase frequency, no urgency, No discharge  Musculoskeletal: No myalgia, no arthralgia  Neurologic: No headache, no vertigo, no paresthesia, no focal deficits  Skin: No rash, no evidence of trauma  All other ROS are negative    PE:  General: NAD; well appearing; A&O x3   Head: NC/AT  Eyes: PERRL, EOMI  ENT: Airway patent, mmm  Pulmonary: CTA b/l, symmetric breath sounds. No W/R/R.  Cardiac: s1s2, RRR, no M,G,R, No JVD  Abdomen: +BS, ND, NT, soft, no guarding, no rebound, no masses , no rigidity  Back: Normal  spine  Extremities: FROM, symmetric pulses.  Left lower extremity edema extending up to the calf with no TTP and grossly stable in all movable movements.  Mild left calf TTP.  Skin: no rash or bruising  Neurologic: alert, speech clear, no focal deficits  Psych: nl mood/affect, nl insight.    MDM: 58-year-old female past medical history of hypertension, currently being evaluated evaluated by GI for PUD with upcoming EGD EGD and colonoscopy in 11/2023 and seen here on independent chart review on 7/26/2023 for similar symptoms presents complaining of persistent diarrhea for the last month worse over the shoulder last 10 days.  Possible IBS versus enteritis versus infectious colitis versus parasitic infection versus C. difficile.  Patient unable to tolerate p.o., consider dehydration versus allergic versus electrolyte abnormality.  Will obtain CBC, CMP, stool O&P, lipase, magnesium, phosphorus, RVP, VBG.  Will obtain CT A/P.  Given given left hand pain and lower extremity edema, in the setting of fall likely ankle sprain however will obtain ultrasound to rule out ultrasound to rule out DVT and x-ray to rule out fracture. Symptomatic control prn. I have personally performed a history and physical examination of the patient and discussed management with the ROSSI as well as the patient.  I reviewed the ROSSI's note and agree with the documented findings and plan of care.  I have authored and modified critical sections of the Provider Note, including but not limited to HPI, Physical Exam and MDM.    HPI: 58-year-old female past medical history of hypertension, currently being evaluated evaluated by GI for PUD with upcoming EGD EGD and colonoscopy in 11/2023 and seen here on independent chart review on 7/26/2023 for similar symptoms presents complaining of persistent diarrhea for the last month worse over the shoulder last 10 days.  Denies any abdominal pain.  Endorses intermittent nausea not present at this time.  Denies recent hospitalization, alcohol use, recent antibiotic use, known sick contacts, fever, chills.  No relief with Pepto-Bismol or Imodium with Pepto-Bismol or Imodium.  Of note patient states she of note patient states she has been feeling weak which caused her to roll her ankle several days ago.  Denies head trauma or his head trauma or LOC.  No other current complaints at this time.    ROS:   General: No fever, no chills, no malaise, no fatigue  ENT: No earache, no coryza, no sore throat  Neck: No stiffness, no swollen glands, no dysphagia  Respiratory: No cough, no dyspnea, no pleuritic chest pain  Cardiac: no chest pain, no palpitations, no edema, no jvd  Abdomen: See HPI  : No dysuria, no increase frequency, no urgency, No discharge  Musculoskeletal: No myalgia, no arthralgia  Neurologic: No headache, no vertigo, no paresthesia, no focal deficits  Skin: No rash, no evidence of trauma  All other ROS are negative    PE:  General: NAD; well appearing; A&O x3   Head: NC/AT  Eyes: PERRL, EOMI  ENT: Airway patent, mmm  Pulmonary: CTA b/l, symmetric breath sounds. No W/R/R.  Cardiac: s1s2, RRR, no M,G,R, No JVD  Abdomen: +BS, ND, NT, soft, no guarding, no rebound, no masses , no rigidity  Back: Normal  spine  Extremities: FROM, symmetric pulses.  Left lower extremity edema extending up to the calf with no TTP and grossly stable in all movable movements.  Mild left calf TTP.  Skin: no rash or bruising  Neurologic: alert, speech clear, no focal deficits  Psych: nl mood/affect, nl insight.    MDM: 58-year-old female past medical history of hypertension, currently being evaluated evaluated by GI for PUD with upcoming EGD EGD and colonoscopy in 11/2023 and seen here on independent chart review on 7/26/2023 for similar symptoms presents complaining of persistent diarrhea for the last month worse over the shoulder last 10 days.  Possible IBS versus enteritis versus infectious colitis versus parasitic infection versus C. difficile vs Ca.  Patient unable to tolerate p.o., consider dehydration versus allergic versus electrolyte abnormality.  Will obtain CBC, CMP, stool O&P, lipase, magnesium, phosphorus, RVP, VBG.  Will obtain CT A/P.  Given given left hand pain and lower extremity edema, in the setting of fall likely ankle sprain however will obtain ultrasound to rule out ultrasound to rule out DVT and x-ray to rule out fracture. Symptomatic control prn. I have personally performed a history and physical examination of the patient and discussed management with the ROSSI as well as the patient.  I reviewed the ROSSI's note and agree with the documented findings and plan of care.  I have authored and modified critical sections of the Provider Note, including but not limited to HPI, Physical Exam and MDM.    HPI: 58-year-old female past medical history of hypertension, currently being evaluated evaluated by GI for PUD with upcoming EGD EGD and colonoscopy in 11/2023 and seen here on independent chart review on 7/26/2023 for similar symptoms presents complaining of persistent diarrhea for the last month worse over the shoulder last 10 days.  Denies any abdominal pain.  Endorses intermittent nausea not present at this time.  Denies recent hospitalization, alcohol use, recent antibiotic use, known sick contacts, fever, chills.  No relief with Pepto-Bismol or Imodium with Pepto-Bismol or Imodium.  Of note patient states she of note patient states she has been feeling weak which caused her to roll her ankle several days ago.  Denies head trauma or his head trauma or LOC.  No other current complaints at this time.    ROS:   General: No fever, no chills, no malaise, no fatigue  ENT: No earache, no coryza, no sore throat  Neck: No stiffness, no swollen glands, no dysphagia  Respiratory: No cough, no dyspnea, no pleuritic chest pain  Cardiac: no chest pain, no palpitations, no edema, no jvd  Abdomen: See HPI  : No dysuria, no increase frequency, no urgency, No discharge  Musculoskeletal: No myalgia, no arthralgia  Neurologic: No headache, no vertigo, no paresthesia, no focal deficits  Skin: No rash, no evidence of trauma  All other ROS are negative    PE:  General: NAD; well appearing; A&O x3   Head: NC/AT  Eyes: PERRL, EOMI  ENT: Airway patent, mmm  Pulmonary: CTA b/l, symmetric breath sounds. No W/R/R.  Cardiac: s1s2, RRR, no M,G,R, No JVD  Abdomen: +BS, ND, NT, soft, no guarding, no rebound, no masses , no rigidity  Back: Normal  spine  Extremities: FROM, symmetric pulses.  Left lower extremity edema extending up to the calf with no TTP and grossly stable in all movable movements.  Mild left calf TTP.  Skin: no rash or bruising  Neurologic: alert, speech clear, no focal deficits  Psych: nl mood/affect, nl insight.    MDM: 58-year-old female past medical history of hypertension, currently being evaluated evaluated by GI for PUD with upcoming EGD EGD and colonoscopy in 11/2023 and seen here on independent chart review on 7/26/2023 for similar symptoms presents complaining of persistent diarrhea for the last month worse over the shoulder last 10 days.  Possible IBS versus enteritis versus infectious colitis versus parasitic infection versus C. difficile vs Ca.  Patient unable to tolerate p.o., consider dehydration versus allergic versus electrolyte abnormality.  Will obtain CBC, CMP, stool O&P, lipase, magnesium, phosphorus, RVP, VBG.  Will obtain CT A/P.  Given left calf pain and lower extremity edema, in the setting of fall likely ankle sprain however will obtain ultrasound to rule out ultrasound to rule out DVT and x-ray to rule out fracture. Symptomatic control prn.

## 2023-09-06 NOTE — H&P ADULT - NSHPREVIEWOFSYSTEMS_GEN_ALL_CORE
REVIEW OF SYSTEMS:  Constitutional: [ ] negative [ ] fevers [ ] chills [ ] weight loss [ ] weight gain  HEENT: [ ] negative [ ] dry eyes [ ] eye irritation [ ] postnasal drip [ ] nasal congestion  CV: [ ] negative  [ ] chest pain [ ] orthopnea [ ] palpitations [ ] murmur  Resp: [ ] negative [ ] cough [ ] shortness of breath [ ] dyspnea [ ] wheezing [ ] sputum [ ] hemoptysis  GI: [ ] negative [ ] nausea [ ] vomiting [ ] diarrhea [ ] constipation [ ] abd pain [ ] dysphagia   : [ ] negative [ ] dysuria [ ] nocturia [ ] hematuria [ ] increased urinary frequency  Musculoskeletal: [ ] negative [ ] back pain [ ] myalgias [ ] arthralgias [ ] fracture  Skin: [ ] negative [ ] rash [ ] itch  Neurological: [ ] negative [ ] headache [ ] dizziness [ ] syncope [ ] weakness [ ] numbness  Psychiatric: [ ] negative [ ] anxiety [ ] depression  Endocrine: [ ] negative [ ] diabetes [ ] thyroid problem  Hematologic/Lymphatic: [ ] negative [ ] anemia [ ] bleeding problem  Allergic/Immunologic: [ ] negative [ ] itchy eyes [ ] nasal discharge [ ] hives [ ] angioedema  [ ] All other systems negative  [ ] Unable to assess ROS because ________ REVIEW OF SYSTEMS:  Constitutional: [ ] negative [ ] fevers [ ] chills [X ] weight loss [ ] weight gain  HEENT: [ X] negative [ ] dry eyes [ ] eye irritation [ ] postnasal drip [ ] nasal congestion  CV: [ X] negative  [ ] chest pain [ ] orthopnea [ ] palpitations [ ] murmur  Resp: [X ] negative [ ] cough [ ] shortness of breath [ ] dyspnea [ ] wheezing [ ] sputum [ ] hemoptysis  GI: [ ] negative [ ] nausea [ ] vomiting [X ] diarrhea [ ] constipation [ ] abd pain [ ] dysphagia   : [X ] negative [ ] dysuria [ ] nocturia [ ] hematuria [ ] increased urinary frequency  Musculoskeletal: [X ] negative [ ] back pain [ ] myalgias [ ] arthralgias [ ] fracture  Skin: [ X] negative [ ] rash [ ] itch  Neurological: [ X] negative [ ] headache [ ] dizziness [ ] syncope [ ] weakness [ ] numbness  Psychiatric: [X ] negative [ ] anxiety [ ] depression  Endocrine: [X ] negative [ ] diabetes [ ] thyroid problem  Hematologic/Lymphatic: [ X] negative [ ] anemia [ ] bleeding problem  Allergic/Immunologic: [X ] negative [ ] itchy eyes [ ] nasal discharge [ ] hives [ ] angioedema  [ ] All other systems negative  [ ] Unable to assess ROS because ________

## 2023-09-06 NOTE — H&P ADULT - NS ATTEST RISK PROBLEM GEN_ALL_CORE FT
MESHA i/s/o severe uremia and hyperphosphatemia; High c/f  and treated as TLS per Heme/Onc evaluation;

## 2023-09-06 NOTE — ED PROVIDER NOTE - PROGRESS NOTE DETAILS
CRISTOPHER Barfield- following with Dr. Lubin. CRISTOPHER Barfield- discussed CT results with pt. will admit to medicine. DVT on US started on heparin.

## 2023-09-06 NOTE — H&P ADULT - ATTENDING COMMENTS
57yo Female w/ MHx of HTN a/w MESHA, severe uremia and acute DVT i/s/o newly found carcinomatosis with liver metastases;    Assessment and plan modified and supplemented where indicated; 59yo Female w/ MHx of HTN a/w MESHA, severe uremia and acute DVT i/s/o newly found carcinomatosis with liver metastases; High c/f  and treated as TLS i/s/o severe electrolytes imbalances as per Heme/Onc evaluation;     Assessment and plan modified and supplemented where indicated;

## 2023-09-06 NOTE — H&P ADULT - PROBLEM SELECTOR PLAN 1
per pt, resistant to pepto-bismol and imodium. Likely 2/2 partial metastatic invasion of colon vs less likely infectious etiology  -Stool counts  -f/u stool electrolytes, stool culture, GI PCR  -ensure adequate hydration per pt, resistant to pepto-bismol and imodium. Likely 2/2 partial metastatic invasion of colon vs less likely infectious etiology  -Stool counts  -Strict I/O q4h  -f/u stool electrolytes, stool culture, GI PCR, c diff toxin  -ensure adequate hydration     -s/p 2L LR in ED     -c/w NS 125cc/hr  -monitor for BRBPR given hx of +FOBT and on heparin gtt Acute, severe; SCr 4.7 this admission, up from baseline of 0.97 on 07/26/2023. Most likely multifactorial, pre-renal iso severe dehydration 2/2 persistent diarrhea and poor PO intake with  intra-renal iso severe hyperuricemia, hyperphosphatemia vs renal vein thrombosis;  -s/p 2L LR in ED  -f/u repeat CMP with next PTT  -f/u serum/urine osmol, UA, and urine lytes  -Renally dose medications  -strict I/Os  -IV hydration with no potassium supp  -f/u Renal duplex r/o renal vein thrombosis   -Renal diet: no concentrated phos and potassium

## 2023-09-06 NOTE — ED ADULT TRIAGE NOTE - CHIEF COMPLAINT QUOTE
pt with Hx. of Diarrhea coming similar symptoms with weakness, loss of appetite denies ABD pain, no fever. dysuria

## 2023-09-06 NOTE — H&P ADULT - PROBLEM SELECTOR PLAN 3
SCr 4.7 this admission, up from 0.97 on 07/26. Most likely pre-renal iso severe dehydration 2/2 persistent diarrhea and poor PO intake.  -s/p 1L LR in ED, 2nd liter in-progress  -f/u repeat CMP with next PTT  -f/u serum/urine osmol, UA, and urine lytes  -Renally dose medications SCr 4.7 this admission, up from 0.97 on 07/26. Most likely pre-renal iso severe dehydration 2/2 persistent diarrhea and poor PO intake + intra-renal iso hyperuricemia/hyperphosphatemia vs renal vein thrombosis  -s/p 2L LR in ED  -f/u repeat CMP with next PTT  -f/u serum/urine osmol, UA, and urine lytes  -Renally dose medications  -f/u renal duplex per pt, resistant to pepto-bismol and imodium. Likely 2/2 partial metastatic invasion of colon vs less likely infectious etiology  -Stool counts  -Strict I/O q4h  -f/u stool electrolytes, stool culture, GI PCR, c diff toxin  -ensure adequate hydration     -s/p 2L LR in ED     -c/w NS 125cc/hr  -monitor for BRBPR given hx of +FOBT and on heparin gtt per pt, resistant to pepto-bismol and imodium. Likely 2/2 partial metastatic invasion of colon vs less likely infectious etiology  -Stool counts  -Strict I/O q4h  -f/u stool electrolytes, stool culture, GI PCR, C diff PCR  -ensure adequate hydration     -s/p 2L LR in ED     -c/w NS 125cc/hr  -monitor for BRBPR given hx of +FOBT and on heparin gtt

## 2023-09-07 NOTE — DIETITIAN INITIAL EVALUATION ADULT - PERTINENT MEDS FT
MEDICATIONS  (STANDING):  heparin  Infusion. 1100 Unit(s)/Hr (11 mL/Hr) IV Continuous <Continuous>  influenza   Vaccine 0.5 milliLiter(s) IntraMuscular once  pantoprazole    Tablet 40 milliGRAM(s) Oral before breakfast  rasburicase IVPB 6 milliGRAM(s) IV Intermittent once  sertraline 25 milliGRAM(s) Oral daily  sevelamer carbonate 800 milliGRAM(s) Oral three times a day  sodium chloride 0.9%. 1000 milliLiter(s) (125 mL/Hr) IV Continuous <Continuous>  traZODone 50 milliGRAM(s) Oral at bedtime    MEDICATIONS  (PRN):  acetaminophen     Tablet .. 650 milliGRAM(s) Oral every 6 hours PRN Temp greater or equal to 38C (100.4F), Mild Pain (1 - 3)  aluminum hydroxide/magnesium hydroxide/simethicone Suspension 30 milliLiter(s) Oral every 4 hours PRN Dyspepsia  heparin   Injectable 3500 Unit(s) IV Push every 6 hours PRN For aPTT between 40 - 57  heparin   Injectable 7000 Unit(s) IV Push every 6 hours PRN For aPTT less than 40  melatonin 3 milliGRAM(s) Oral at bedtime PRN Insomnia  ondansetron Injectable 4 milliGRAM(s) IV Push every 8 hours PRN Nausea and/or Vomiting

## 2023-09-07 NOTE — PROGRESS NOTE ADULT - PROBLEM SELECTOR PLAN 3
per pt, resistant to pepto-bismol and imodium. Likely 2/2 partial metastatic invasion of colon vs less likely infectious etiology  -Stool counts  -Strict I/O q4h  -f/u stool electrolytes, stool culture, GI PCR, C diff PCR  -ensure adequate hydration     -s/p 2L LR in ED     -c/w NS 125cc/hr  -monitor for BRBPR given hx of +FOBT and on heparin gtt Likely 2/2 partial metastatic invasion of colon vs less likely infectious etiology  -Stool counts  -f/u stool culture, GI PCR, C diff PCR  -ensure adequate hydration

## 2023-09-07 NOTE — DIETITIAN INITIAL EVALUATION ADULT - OTHER INFO
Patient was seen at bedside. A&Ox4. No known food allergies. Patient is currently consuming <50% of meals due to no appetite and constant acid reflux. No chewing or swallowing issues. Denies nausea or vomiting. Patient complains of diarrhea, currently being ruled out for C. Diff. Last bowel movement was today. Patient's usual body weight on May 19th was 234 lbs, currently 197.1 lbs, weight loss -36.9 lbs (-15.8%) in 4 months.

## 2023-09-07 NOTE — DIETITIAN INITIAL EVALUATION ADULT - PROBLEM SELECTOR PLAN 1
Acute, severe; SCr 4.7 this admission, up from baseline of 0.97 on 07/26/2023. Most likely multifactorial, pre-renal iso severe dehydration 2/2 persistent diarrhea and poor PO intake with  intra-renal iso severe hyperuricemia, hyperphosphatemia vs renal vein thrombosis;  -s/p 2L LR in ED  -f/u repeat CMP with next PTT  -f/u serum/urine osmol, UA, and urine lytes  -Renally dose medications  -strict I/Os  -IV hydration with no potassium supp  -f/u Renal duplex r/o renal vein thrombosis   -Renal diet: no concentrated phos and potassium

## 2023-09-07 NOTE — PROGRESS NOTE ADULT - PROBLEM SELECTOR PLAN 2
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   -Given hx of PUD symptoms and persistent diarrhea, potentially gastrinoma, though very rare    -f/u serum Gastrin  -Formal Oncology c/s in AM CTAP w/ omental carcinomatosis and liver metastases, as well as apparent colonic invasion.   -Oncology consulted   -IR consult for potential biopsy  -CT chest without contrast CTAP w/ omental carcinomatosis and liver metastases, as well as apparent colonic invasion.   -Oncology input appreciated   -IR consult for potential biopsy  -GI consult as below   -obtain CT chest without contrast

## 2023-09-07 NOTE — DIETITIAN INITIAL EVALUATION ADULT - PROBLEM SELECTOR PLAN 4
Pt with hyponatremia, hypochloremia, hypocalcemia, hypermagnesemia, and marked hyperphosphatemia. Also with decreased HCO3 and elevated AG, but without acidosis on VBG. Likely 2/2 combination of diarrhea, poor PO intake, and kidney/liver dysfunction. Also concerning for tumor lysis syndrome  -Uric Acid 22.2, Phos 7.0, K 4.7     -c/f TLS; K may be falsely low iso diarrhea     -Per Onc, more likely 2/2 kidney dysfunction     -Rasburicase 6mg x1, Sevelamer tid     -Heme/Onc note and recs reviewed;   -f/u repeat CMP  -Monitor on telemetry  -q4h VS

## 2023-09-07 NOTE — DIETITIAN NUTRITION RISK NOTIFICATION - TREATMENT: THE FOLLOWING DIET HAS BEEN RECOMMENDED
Diet, Regular:   No Concentrated Potassium  No Concentrated Phosphorus (09-07-23 @ 00:07) [Active]

## 2023-09-07 NOTE — DIETITIAN INITIAL EVALUATION ADULT - WEIGHT (KG)
68 Ftsg Text: The defect edges were debeveled with a #15c scalpel blade.  Given the location of the defect, shape of the defect and the proximity to free margins a full thickness skin graft was deemed most appropriate.  Using a sterile surgical marker, the primary defect shape was transferred to the donor site. The area thus outlined was incised deep to adipose tissue with a #15c scalpel blade.  The harvested graft was then trimmed of adipose tissue until only dermis and epidermis was left.  The skin margins of the secondary defect were undermined to an appropriate distance in all directions utilizing iris scissors.  The secondary defect was closed with interrupted buried subcutaneous sutures.  The skin edges were then re-apposed with running  sutures.  The skin graft was then placed in the primary defect and oriented appropriately.

## 2023-09-07 NOTE — DIETITIAN INITIAL EVALUATION ADULT - PROBLEM SELECTOR PLAN 2
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   -Given hx of PUD symptoms and persistent diarrhea, potentially gastrinoma, though very rare    -f/u serum Gastrin  -Formal Oncology c/s in AM

## 2023-09-07 NOTE — DIETITIAN INITIAL EVALUATION ADULT - REASON FOR ADMISSION
Malignant neoplasm metastatic to liver or intrahepatic bile duct    59yo Female w/ mhx HTN who is p/w several months of worsening diarrhea and weight loss.

## 2023-09-07 NOTE — CONSULT NOTE ADULT - ASSESSMENT
This is a 57 y/o F with PMHx of HTN who presented to the ED for evaluation of diarrhea and weight loss. Patient noted ongoing diarrhea for the past 1-2 months alongside significant weight loss. On CT A/P noncon, found to have evidence of metastatic disease w/ multiple liver lesions, peritoneal carcinomatosis/malignant ascites and several areas of colonic wall thickening w/ unknown primary.     #metastatic disease- liver lesions, peritoneal carcinomatosis,  #diarrhea     #Early satiety  #Poor appetite   Patient w/ poor appetite and early satiety. Able to tolerate solid foods but there is concern for     Recommendations:   - recommend inpatient flex sig once better medically optimized   - upper GI series for evaluation of partial outlet obstruction   - diet as tolerated  - trend CBC, transfuse w/ Hgb goal >7   - rest of care per primary team     note not finalized until signed/attested by attending  This is a 57 y/o F with PMHx of HTN who presented to the ED for evaluation of diarrhea and weight loss. Patient noted ongoing diarrhea for the past 1-2 months alongside significant weight loss. On CT A/P noncon, found to have evidence of metastatic disease w/ multiple liver lesions, peritoneal carcinomatosis/malignant ascites and several areas of colonic wall thickening w/ unknown primary.     #metastatic disease- liver lesions, peritoneal carcinomatosis,  #diarrhea     #Early satiety  #Poor appetite   Patient w/ poor appetite and early satiety. Able to tolerate solid foods but there is concern for     Recommendations:   - recommend inpatient flex sig once better medically optimized   - upper GI series for evaluation of partial outlet obstruction   - trend CBC, transfuse w/ Hgb goal >7   - rest of care per primary team     note not finalized until signed/attested by attending  This is a 59 y/o F with PMHx of HTN who presented to the ED for evaluation of diarrhea and weight loss. Patient noted ongoing diarrhea for the past 1-2 months alongside significant weight loss. On CT A/P noncon, found to have evidence of metastatic disease w/ multiple liver lesions, peritoneal carcinomatosis/malignant ascites and several areas of colonic wall thickening w/ unknown primary.     #Metastatic disease of unknown origin  #Diarrhea   Patient presented w/ diarrhea, sig weight loss, early satiety and poor appetite. Patient on imaging was found to have evidence of metastatic disease, possibly colon as primary. Several areas of colonic wall thickening, serosal implants vs primary neoplasm. IR consulted for potential biopsy, either from liver or peritoneum. Discussed w/ patient plan for colonoscopy, but pt hesitant to proceed as she does not think she can complete bowel prep. Given masslike thickening in upper rectum/distal sigmoid, will proceed with flex sig early next week once medically optimized.     #UGIB   #Anemia   Patient w/ 1-2 months of reflux, n/v, poor appetite and diarrhea (dark brown or black in color). Hgb drop from 11.5 in 7/2023 to 10 on current admission in 9/2023. Concern for GIB though pt did take Pepto-Bismol which could cause black stools. Furthermore on rectal exam, brown stool w/ minimal red blood. Given pt is hemodynamically and no recent black tarry stools, no urgent indication for EGD.     Recommendations:   - plan for inpatient EGD and flex sig once better medically optimized, likely Monday 9/11  - maintain 2 large bore IV access  - maintain active T&S  - PO PPI BID  - maintain PTT in therapeutic range while on heparin gtt  - trend CBC, transfuse w/ hgb goal >7   - correct electrolytes, replete as necessary   - rest of care per primary and heme/onc team     note not finalized until signed/attested by attending

## 2023-09-07 NOTE — CONSULT NOTE ADULT - SUBJECTIVE AND OBJECTIVE BOX
HPI: This is a 57 y/o F with PMHx of HTN who presented to the ED for evaluation of diarrhea and weight loss. Patient noted ongoing diarrhea for the past 1-2 months alongside significant weight loss. Patient was previously seen in the ED about 1 month ago for melena, n/v, and occasional diarrhea. She was discharged w/ omeprazole and outpatient GI f/u. Patient was scheduled for EGD/colonoscopy in 2023. However, she decided to return to the ED yesterday on  for evaluation of worsening diarrhea and weight loss. Patient noted she continues to have about 6 episodes of diarrhea described as black in color. Also endorsed significant weight loss of ~30lb in 3 months, poor appetite, early satiety, n/v and fatigue. Patient has not had any colonoscopy/EGD in the past.     In the ED, labs significant for WBC 21.55, Hgb 10, Na 131, BUN 91, Cr 4.7 (baseline ~1) ,, , ALT 44, phos 7 and uric acid of 22.2. CT A/P noncon showed evidence of metastatic disease w/ multiple liver lesions, peritoneal carcinomatosis/malignant ascites, several areas of colonic wall thickening, unknown primary. Also found to have BLE DVT.     Allergies:  No Known Allergies    Home Medications:  amLODIPine 10 mg oral tablet: 1 tab(s) orally once a day (06 Sep 2023 20:11)  lisinopril 40 mg oral tablet: 1 tab(s) orally once a day (06 Sep 2023 20:11)  metoprolol tartrate 25 mg oral tablet: 1 tab(s) orally 2 times a day (06 Sep 2023 20:11)  pantoprazole 40 mg oral delayed release tablet: 1 tab(s) orally once a day (06 Sep 2023 20:11)  sertraline 25 mg oral tablet: 1 tab(s) orally once a day (06 Sep 2023 20:11)  traZODone 50 mg oral tablet: 1 tab(s) orally once a day (at bedtime) (06 Sep 2023 20:11)    Hospital Medications:  acetaminophen     Tablet .. 650 milliGRAM(s) Oral every 6 hours PRN  aluminum hydroxide/magnesium hydroxide/simethicone Suspension 30 milliLiter(s) Oral every 4 hours PRN  heparin   Injectable 7000 Unit(s) IV Push every 6 hours PRN  heparin   Injectable 3500 Unit(s) IV Push every 6 hours PRN  heparin  Infusion. 1100 Unit(s)/Hr IV Continuous <Continuous>  influenza   Vaccine 0.5 milliLiter(s) IntraMuscular once  melatonin 3 milliGRAM(s) Oral at bedtime PRN  ondansetron Injectable 4 milliGRAM(s) IV Push every 8 hours PRN  pantoprazole    Tablet 40 milliGRAM(s) Oral before breakfast  sertraline 25 milliGRAM(s) Oral daily  sevelamer carbonate 800 milliGRAM(s) Oral three times a day  sodium chloride 0.9%. 1000 milliLiter(s) IV Continuous <Continuous>  traZODone 50 milliGRAM(s) Oral at bedtime      PMHX/PSHX:  HTN (hypertension)    No significant past surgical history    History of     Family history:    Denies family history of colon cancer/polyps, stomach cancer/polyps, pancreatic cancer/masses, liver cancer/disease, ovarian cancer and endometrial cancer.    Social History:   Tob: Denies  EtOH: Denies  Illicit Drugs: Denies    ROS:   General:  +weight loss, fatigue, No fevers, chills, night sweats  Eyes:  Good vision, no reported pain  ENT:  No sore throat, pain, runny nose, dysphagia  CV:  No pain, palpitations, hypo/hypertension  Pulm:  No dyspnea, cough, tachypnea, wheezing  GI:  see HPI  :  No pain, bleeding, incontinence, nocturia  Muscle:  No pain, weakness  Neuro:  No weakness, tingling, memory problems  Psych:  No fatigue, insomnia, mood problems, depression  Endocrine:  No polyuria, polydipsia, cold/heat intolerance  Heme:  No petechiae, ecchymosis, easy bruisability  Skin:  No rash, tattoos, scars, edema    PHYSICAL EXAM:   GENERAL:  No acute distress  HEENT:  NCAT, no scleral icterus   CHEST:  no respiratory distress  HEART:  Regular rate and rhythm  ABDOMEN:  Soft, non-tender, non-distended, normoactive bowel sounds, no masses  EXTREMITIES: edema in L leg   SKIN:  No rash/erythema/ecchymoses/petechiae/wounds/abscess/warm/dry  NEURO:  Alert and oriented x 3, no asterixis    Vital Signs:  Vital Signs Last 24 Hrs  T(C): 36.8 (07 Sep 2023 13:30), Max: 36.8 (07 Sep 2023 13:30)  T(F): 98.3 (07 Sep 2023 13:30), Max: 98.3 (07 Sep 2023 13:30)  HR: 87 (07 Sep 2023 13:) (85 - 95)  BP: 104/63 (07 Sep 2023 13:30) (99/66 - 121/49)  BP(mean): --  RR: 17 (07 Sep 2023 13:) (17 - 19)  SpO2: 100% (07 Sep 2023 13:) (98% - 100%)    Parameters below as of 07 Sep 2023 13:30  Patient On (Oxygen Delivery Method): room air      Daily Height in cm: 177.8 (07 Sep 2023 05:38)    Daily     LABS:                        8.6    17.21 )-----------( 180      ( 07 Sep 2023 06:28 )             27.1     Mean Cell Volume: 85.2 fL (23 @ 06:28)        129<L>  |  92<L>  |  96<H>  ----------------------------<  113<H>  4.4   |  17<L>  |  4.37<H>    Ca    8.4      07 Sep 2023 06:28  Phos  6.3       Mg     2.90         TPro  6.3  /  Alb  2.6<L>  /  TBili  0.5  /  DBili  x   /  AST  99<H>  /  ALT  41<H>  /  AlkPhos  550<H>      LIVER FUNCTIONS - ( 07 Sep 2023 06:28 )  Alb: 2.6 g/dL / Pro: 6.3 g/dL / ALK PHOS: 550 U/L / ALT: 41 U/L / AST: 99 U/L / GGT: x           PT/INR - ( 07 Sep 2023 06:28 )   PT: 12.8 sec;   INR: 1.15 ratio    PTT - ( 07 Sep 2023 15:23 )  PTT:100.1 sec               8.6    17.21 )-----------( 180      ( 07 Sep 2023 06:28 )             27.1                         8.6    18.37 )-----------( 157      ( 06 Sep 2023 22:43 )             27.5                         10.0   21.55 )-----------( 171      ( 06 Sep 2023 11:14 )             30.7       Imaging:  < from: CT Abdomen and Pelvis No Cont (23 @ 13:49) >  PROCEDURE:  CT of the Abdomen and Pelvis was performed.  Sagittal and coronal reformats were performed.    FINDINGS:    Evaluation of the solid visceral organs and vasculature is limited   without the administration of intravenous contrast.    LOWER CHEST: Coronary artery calcifications. Several tubular opacities in   the right lower lobe with a reference measuring 1.2 cm in the medial   right lower lobe (301:10), nonspecific. Additionally, nodularity of the   visualized fissures, indeterminate.    LIVER: Innumerable bilobar hypoattenuating lesions.  BILE DUCTS: Normal caliber.  GALLBLADDER: Suggestion of layering sludge.  SPLEEN: Within normal limits.  PANCREAS: Within normal limits.  ADRENALS: Within normal limits.  KIDNEYS/URETERS: Within normal limits.    BLADDER: Underdistended.  REPRODUCTIVE ORGANS: Limited evaluation due to surrounding ascites and   noncontrast study.    BOWEL: No bowel obstruction. Appendix is normal. Extensive peritoneal   carcinomatosis with serosal implants involving parts of the bowel.   Ill-defined masslike thickening of the upper rectum and distal sigmoid,   question serosal implants or underlying primary malignancy. Left lateral   to and inseparable from the upper rectum, there is a 2.8 x 2.7 cm   hypoattenuating rounded structure, question tumor deposit/implant versus   extramural tumor.  PERITONEUM: Extensive peritoneal carcinomatosis with a reference tumor   depositin the left hemiabdomen measuring 4.2 x 2.1 cm (301:62). Small to   moderate volume abdominopelvic ascites with peritoneal thickening and   nodularity.  VESSELS: Within normal limits.  RETROPERITONEUM/LYMPH NODES: No lymphadenopathy.  ABDOMINAL WALL:Within normal limits.  BONES: Degenerative changes. Multilevel vertebral body hemangiomata.    IMPRESSION:  Limited noncontrast study.    CT evidence of metastatic disease characterized by multiple bilobar liver   lesions and peritoneal carcinomatosis/malignant ascites. Unknown primary.   Several areas of colonic wall thickening, question serosal implants   involving those organs versus primary neoplasm.    Fissural nodularity and subpleural and tubular nodules at the visualized   lung bases, nonspecific.    Findings were discussed with CRISTOPHER Garcia 2023 2:09 PM by Dr. Rincon   with repeat back confirmation.    --- End of Report ---      < end of copied text >    < from: US Duplex Venous Lower Ext Complete, Bilateral (23 @ 12:45) >  TECHNIQUE: Duplex sonography of the BILATERAL LOWER extremity veins with   color and spectral Doppler, with and without compression.    FINDINGS:    RIGHT:  Normal compressibility of the RIGHT common femoral and femoral veins.    Acute deep venous thrombosis in the right popliteal vein and posterior   tibial veins. The peroneal vein is not visualized..    LEFT:  Normal compressibility of the LEFT common femoral vein.    Acute deep venous thrombosis in the mid and distal portions of the   femoral vein, popliteal vein, gastrocnemius, posterior tibial, and   peroneal veins.    IMPRESSION:  Acute deep venous thrombosis: above and below the knee.    Acute deep venous thrombosis in the right popliteal and posterior tibial   veins.  Acute deep venous thrombosis extending from the left femoral vein through   the popliteal and calf veins.    Findings were discussed with Dr. BISWAS 2023 1:26 PM by Dr. Schaeffer with   read back confirmation.    --- End of Report ---    < end of copied text >           HPI: This is a 57 y/o F with PMHx of HTN who presented to the ED for evaluation of diarrhea and weight loss. Patient noted ongoing diarrhea for the past 1-2 months alongside significant weight loss. Patient was previously seen in the ED about 1 month ago for melena, n/v, and occasional diarrhea. She was discharged w/ omeprazole and outpatient GI f/u. Patient was scheduled for EGD/colonoscopy in 2023. However, she decided to return to the ED yesterday on  for evaluation of worsening diarrhea and weight loss. Patient noted she continues to have about 6 episodes of diarrhea described as black in color. Patient has been taking Pepto-Bismol. Also endorsed significant weight loss of ~30lb in 3 months, poor appetite, early satiety, n/v, reflux and fatigue. Patient has not had any colonoscopy/EGD in the past.     In the ED, labs significant for WBC 21.55, Hgb 10, Na 131, BUN 91, Cr 4.7 (baseline ~1) ,, , ALT 44, phos 7 and uric acid of 22.2. CT A/P noncon showed evidence of metastatic disease w/ multiple liver lesions, peritoneal carcinomatosis/malignant ascites, several areas of colonic wall thickening possibly serosal implants, unknown primary. Also found to have BLE DVT.     Allergies:  No Known Allergies    Home Medications:  amLODIPine 10 mg oral tablet: 1 tab(s) orally once a day (06 Sep 2023 20:11)  lisinopril 40 mg oral tablet: 1 tab(s) orally once a day (06 Sep 2023 20:11)  metoprolol tartrate 25 mg oral tablet: 1 tab(s) orally 2 times a day (06 Sep 2023 20:11)  pantoprazole 40 mg oral delayed release tablet: 1 tab(s) orally once a day (06 Sep 2023 20:11)  sertraline 25 mg oral tablet: 1 tab(s) orally once a day (06 Sep 2023 20:11)  traZODone 50 mg oral tablet: 1 tab(s) orally once a day (at bedtime) (06 Sep 2023 20:11)    Hospital Medications:  acetaminophen     Tablet .. 650 milliGRAM(s) Oral every 6 hours PRN  aluminum hydroxide/magnesium hydroxide/simethicone Suspension 30 milliLiter(s) Oral every 4 hours PRN  heparin   Injectable 7000 Unit(s) IV Push every 6 hours PRN  heparin   Injectable 3500 Unit(s) IV Push every 6 hours PRN  heparin  Infusion. 1100 Unit(s)/Hr IV Continuous <Continuous>  influenza   Vaccine 0.5 milliLiter(s) IntraMuscular once  melatonin 3 milliGRAM(s) Oral at bedtime PRN  ondansetron Injectable 4 milliGRAM(s) IV Push every 8 hours PRN  pantoprazole    Tablet 40 milliGRAM(s) Oral before breakfast  sertraline 25 milliGRAM(s) Oral daily  sevelamer carbonate 800 milliGRAM(s) Oral three times a day  sodium chloride 0.9%. 1000 milliLiter(s) IV Continuous <Continuous>  traZODone 50 milliGRAM(s) Oral at bedtime      PMHX/PSHX:  HTN (hypertension)    No significant past surgical history    History of     Family history:    Denies family history of colon cancer/polyps, stomach cancer/polyps, pancreatic cancer/masses, liver cancer/disease, ovarian cancer and endometrial cancer.    Social History:   Tob: Denies  EtOH: Denies  Illicit Drugs: Denies    ROS:   General:  +weight loss, fatigue, No fevers, chills, night sweats  Eyes:  Good vision, no reported pain  ENT:  No sore throat, pain, runny nose, dysphagia  CV:  No pain, palpitations, hypo/hypertension  Pulm:  No dyspnea, cough, tachypnea, wheezing  GI:  see HPI  :  No pain, bleeding, incontinence, nocturia  Muscle:  No pain, weakness  Neuro:  No weakness, tingling, memory problems  Psych:  No fatigue, insomnia, mood problems, depression  Endocrine:  No polyuria, polydipsia, cold/heat intolerance  Heme:  No petechiae, ecchymosis, easy bruisability  Skin:  No rash, tattoos, scars, edema    PHYSICAL EXAM:   GENERAL:  No acute distress  HEENT:  NCAT, no scleral icterus   CHEST:  no respiratory distress  HEART:  Regular rate and rhythm  ABDOMEN:  Soft, non-tender, non-distended, normoactive bowel sounds, no masses  EXTREMITIES: edema in L leg   SKIN:  No rash/erythema/ecchymoses/petechiae/wounds/abscess/warm/dry  NEURO:  Alert and oriented x 3, no asterixis  RECTAL: brown red stool w/ minimal blood, possible extraluminal mass palpated     Vital Signs:  Vital Signs Last 24 Hrs  T(C): 36.8 (07 Sep 2023 13:30), Max: 36.8 (07 Sep 2023 13:30)  T(F): 98.3 (07 Sep 2023 13:30), Max: 98.3 (07 Sep 2023 13:30)  HR: 87 (07 Sep 2023 13:30) (85 - 95)  BP: 104/63 (07 Sep 2023 13:30) (99/66 - 121/49)  BP(mean): --  RR: 17 (07 Sep 2023 13:30) (17 - 19)  SpO2: 100% (07 Sep 2023 13:30) (98% - 100%)    Parameters below as of 07 Sep 2023 13:30  Patient On (Oxygen Delivery Method): room air    Daily Height in cm: 177.8 (07 Sep 2023 05:38)    Daily     LABS:                        8.6    17.21 )-----------( 180      ( 07 Sep 2023 06:28 )             27.1     Mean Cell Volume: 85.2 fL (23 @ 06:28)        129<L>  |  92<L>  |  96<H>  ----------------------------<  113<H>  4.4   |  17<L>  |  4.37<H>    Ca    8.4      07 Sep 2023 06:28  Phos  6.3       Mg     2.90         TPro  6.3  /  Alb  2.6<L>  /  TBili  0.5  /  DBili  x   /  AST  99<H>  /  ALT  41<H>  /  AlkPhos  550<H>      LIVER FUNCTIONS - ( 07 Sep 2023 06:28 )  Alb: 2.6 g/dL / Pro: 6.3 g/dL / ALK PHOS: 550 U/L / ALT: 41 U/L / AST: 99 U/L / GGT: x           PT/INR - ( 07 Sep 2023 06:28 )   PT: 12.8 sec;   INR: 1.15 ratio    PTT - ( 07 Sep 2023 15:23 )  PTT:100.1 sec               8.6    17.21 )-----------( 180      ( 07 Sep 2023 06:28 )             27.1                         8.6    18.37 )-----------( 157      ( 06 Sep 2023 22:43 )             27.5                         10.0   21.55 )-----------( 171      ( 06 Sep 2023 11:14 )             30.7       Imaging:  < from: CT Abdomen and Pelvis No Cont (23 @ 13:49) >  PROCEDURE:  CT of the Abdomen and Pelvis was performed.  Sagittal and coronal reformats were performed.    FINDINGS:    Evaluation of the solid visceral organs and vasculature is limited   without the administration of intravenous contrast.    LOWER CHEST: Coronary artery calcifications. Several tubular opacities in   the right lower lobe with a reference measuring 1.2 cm in the medial   right lower lobe (301:10), nonspecific. Additionally, nodularity of the   visualized fissures, indeterminate.    LIVER: Innumerable bilobar hypoattenuating lesions.  BILE DUCTS: Normal caliber.  GALLBLADDER: Suggestion of layering sludge.  SPLEEN: Within normal limits.  PANCREAS: Within normal limits.  ADRENALS: Within normal limits.  KIDNEYS/URETERS: Within normal limits.    BLADDER: Underdistended.  REPRODUCTIVE ORGANS: Limited evaluation due to surrounding ascites and   noncontrast study.    BOWEL: No bowel obstruction. Appendix is normal. Extensive peritoneal   carcinomatosis with serosal implants involving parts of the bowel.   Ill-defined masslike thickening of the upper rectum and distal sigmoid,   question serosal implants or underlying primary malignancy. Left lateral   to and inseparable from the upper rectum, there is a 2.8 x 2.7 cm   hypoattenuating rounded structure, question tumor deposit/implant versus   extramural tumor.  PERITONEUM: Extensive peritoneal carcinomatosis with a reference tumor   depositin the left hemiabdomen measuring 4.2 x 2.1 cm (301:62). Small to   moderate volume abdominopelvic ascites with peritoneal thickening and   nodularity.  VESSELS: Within normal limits.  RETROPERITONEUM/LYMPH NODES: No lymphadenopathy.  ABDOMINAL WALL:Within normal limits.  BONES: Degenerative changes. Multilevel vertebral body hemangiomata.    IMPRESSION:  Limited noncontrast study.    CT evidence of metastatic disease characterized by multiple bilobar liver   lesions and peritoneal carcinomatosis/malignant ascites. Unknown primary.   Several areas of colonic wall thickening, question serosal implants   involving those organs versus primary neoplasm.    Fissural nodularity and subpleural and tubular nodules at the visualized   lung bases, nonspecific.    Findings were discussed with CRISTOPHER Garcia 2023 2:09 PM by Dr. Rincon   with repeat back confirmation.    --- End of Report ---      < end of copied text >    < from: US Duplex Venous Lower Ext Complete, Bilateral (23 @ 12:45) >  TECHNIQUE: Duplex sonography of the BILATERAL LOWER extremity veins with   color and spectral Doppler, with and without compression.    FINDINGS:    RIGHT:  Normal compressibility of the RIGHT common femoral and femoral veins.    Acute deep venous thrombosis in the right popliteal vein and posterior   tibial veins. The peroneal vein is not visualized..    LEFT:  Normal compressibility of the LEFT common femoral vein.    Acute deep venous thrombosis in the mid and distal portions of the   femoral vein, popliteal vein, gastrocnemius, posterior tibial, and   peroneal veins.    IMPRESSION:  Acute deep venous thrombosis: above and below the knee.    Acute deep venous thrombosis in the right popliteal and posterior tibial   veins.  Acute deep venous thrombosis extending from the left femoral vein through   the popliteal and calf veins.    Findings were discussed with Dr. BISWAS 2023 1:26 PM by Dr. Schaeffer with   read back confirmation.    --- End of Report ---    < end of copied text >

## 2023-09-07 NOTE — PROGRESS NOTE ADULT - PROBLEM SELECTOR PLAN 8
Likely 2/2 chronic GI bleed given hx of positive FOBT, colonic invasion by malignancy, and dark stools  -monitor CBC  -transfuse for Hgb<7 -Likely d/t liver metastases  -trend LFT's

## 2023-09-07 NOTE — DIETITIAN INITIAL EVALUATION ADULT - PROBLEM SELECTOR PLAN 6
WBC 21.55, up from 12.27 on 07/26. No bands, but ANC doubled over this timeframe 9.63->17.80. Pt without other infectious symptoms, so likely reactive iso DVT + cancer + severe dehydration, but need to r/o infectious process  -f/u BCx/UCx/UA  -f/u CXR  -q4h VS

## 2023-09-07 NOTE — DIETITIAN INITIAL EVALUATION ADULT - PERTINENT LABORATORY DATA
09-07    129<L>  |  92<L>  |  96<H>  ----------------------------<  113<H>  4.4   |  17<L>  |  4.37<H>    Ca    8.4      07 Sep 2023 06:28  Phos  6.3     09-07  Mg     2.90     09-07    TPro  6.3  /  Alb  2.6<L>  /  TBili  0.5  /  DBili  x   /  AST  99<H>  /  ALT  41<H>  /  AlkPhos  550<H>  09-07  A1C with Estimated Average Glucose Result: 5.4 % (09-07-23 @ 06:28)

## 2023-09-07 NOTE — CONSULT NOTE ADULT - SUBJECTIVE AND OBJECTIVE BOX
Interventional Radiology    Evaluate for Procedure: biopsy for peritoneal carcinomatosis and liver mets    HPI: 57 y/o F with PMHx of HTN who presented to the ED for evaluation of diarrhea and weight loss. Also endorsed significant weight loss of ~30lb in 3 months, poor appetite, early satiety, nausea/vomiting, reflux and fatigue. In the ED, labs significant for WBC 21.55, Hgb 10, Na 131, BUN 91, Cr 4.7 (baseline ~1) ,, , ALT 44, phos 7 and uric acid of 22.2. CT A/P noncon showed evidence of metastatic disease w/ multiple liver lesions, peritoneal carcinomatosis/malignant ascites, several areas of colonic wall thickening possibly serosal implants, unknown primary. Also found to have BLE DVT.     Allergies: No Known Allergies    Medications (Abx/Cardiac/Anticoagulation/Blood Products)    heparin   Injectable: 7000 Unit(s) IV Push (09-06 @ 16:51)  heparin  Infusion.: 1300 Unit(s)/Hr IV Continuous (09-07 @ 01:05)  heparin  Infusion.: 900 Unit(s)/Hr IV Continuous (09-07 @ 13:16)    Data:  177.8  T(C): 36.6  HR: 98  BP: 118/75  RR: 17  SpO2: 100%    -WBC 17.21 / HgB 8.6 / Hct 27.1 / Plt 180  -Na 129 / Cl 92 / BUN 96 / Glucose 113  -K 4.4 / CO2 17 / Cr 4.37  -ALT 41 / Alk Phos 550 / T.Bili 0.5  -INR -- / .1      Radiology:   CTAP 9/6/23:  IMPRESSION:  Limited noncontrast study.    CT evidence of metastatic disease characterized by multiple bilobar liver lesions and peritoneal carcinomatosis/malignant ascites. Unknown primary. Several areas of colonic wall thickening, question serosal implants involving those organs versus primary neoplasm.    Fissural nodularity and subpleural and tubular nodules at the visualized lung bases, nonspecific.    Findings were discussed with CRISTOPHER Garcia 9/6/2023 2:09 PM by Dr. Rincon with repeat back confirmation.    Assessment/Plan:      57 y/o F with PMHx of HTN who presented to the ED for evaluation of diarrhea and weight loss. Also endorsed significant weight loss of ~30lb in 3 months, poor appetite, early satiety, nausea/vomiting, reflux and fatigue. In the ED, labs significant for WBC 21.55, Hgb 10, Na 131, BUN 91, Cr 4.7 (baseline ~1) ,, , ALT 44, phos 7 and uric acid of 22.2. CT A/P noncon showed evidence of metastatic disease w/ multiple liver lesions, peritoneal carcinomatosis/malignant ascites, several areas of colonic wall thickening possibly serosal implants, unknown primary. Also found to have BLE DVT. IR consulted for possible biopsy of peritoneal carcinomatosis. At this time, suggest medical management for acute issues. Also, would recommend CTAP with IV contrast to better evaluate metastatic disease process. If that cannot be done, please obtain MR Abdomen and Pelvis with contrast.  Case discussed with Dr. Malik.

## 2023-09-07 NOTE — CONSULT NOTE ADULT - ASSESSMENT
57yo Female w/ mhx HTN who is p/w several months of worsening diarrhea and weight loss ~30lb in 3 months, episodes of black stool. New bilateral DVT found on duplex. Oncology consulted for abd masses seen CT.       Visit ED in 7/2023 for black stools, n/v, and occasional diarrhea.  Was scheduled for EGD/colonoscopy in 11/2023. No previous colonscopy. She can not remember when was her last PAP smear and mammogram. Denied vaginal bleeding.     CT abd/p without contrast on 9/6/23 showing Limited noncontrast study. evidence of metastatic disease characterized by multiple bilobar liver lesions and peritoneal carcinomatosis/malignant ascites. Several areas of colonic wall thickening, question serosal implants involving those organs versus primary neoplasm. Fissural nodularity and subpleural and tubular nodules at the visualized lung bases, nonspecific.     Duplex on Sandra Acute deep venous thrombosis: above and below the knee. Acute deep venous thrombosis in the right popliteal and posterior tibial veins.Acute deep venous thrombosis extending from the left femoral vein through the popliteal and calf veins.  Started heparin gtt after the admission.     Lab significant for Hb8.6 wbc 17.2 plt 180 MCV 85 Cr 4.3; ALT 41 AST 99; ;   -Phos Mg 2.9 Uric acid 14.9 this morning lab on 9/7 after one dose of rasburicase 6mg IV given.      #Abdominal mass with suspected carcinomatosis   -IR consult for potential biopsy  -Abdominal ultrasound   -CT chest without contrast (due to MESHA)   -GI consult in view of black stool and suspected GI bleeding   -age appropriate cancer screen as outpt with her PCP and primary team     #DVT   -continue with Heparin gtt for now; closely monitoring Hb (at least Q12hr lab); active type and screen; transfuse if Hb<7,   -if pt has evidence of GI bleeding, may consider IVC filter   -Pt need to be on long term AC if biopsy pathology confirmed as malignancy       #Increased uric acid, phosphorus, MESHA   -suspecting TLS   -rasburicase 6mg given last night by on call oncology fellow   -Uric acid down to 14; will give another dose of rasburicase 6mg IV(ordered by onc fellow)  -renal dosed allopurinol 100mg PO daily   -TLS lab including BMP, phos, Mg, LDH, uric acid q12hr for now;   -check coagulation panel including fibrinogen, D-dimer   -check G-6-phosphate dehydrogenase   -Renal consult for MESHA   -c/w IVF; avoid IV contrast and renal toxic agents/meds.       Note is not finalized until signed by attending   Marin Villa PGY6   hem & onc fellow [id number] or TEAM             57yo Female w/ mhx HTN who is p/w several months of worsening diarrhea and weight loss ~30lb in 3 months, episodes of black stool. New bilateral DVT found on duplex. Oncology consulted for abd masses seen CT.       Visit ED in 7/2023 for black stools, n/v, and occasional diarrhea.  Was scheduled for EGD/colonoscopy in 11/2023. No previous colonscopy. She can not remember when was her last PAP smear and mammogram. Denied vaginal bleeding.     CT abd/p without contrast on 9/6/23 showing Limited noncontrast study. evidence of metastatic disease characterized by multiple bilobar liver lesions and peritoneal carcinomatosis/malignant ascites. Several areas of colonic wall thickening, question serosal implants involving those organs versus primary neoplasm. Fissural nodularity and subpleural and tubular nodules at the visualized lung bases, nonspecific.     Duplex on Sandra Acute deep venous thrombosis: above and below the knee. Acute deep venous thrombosis in the right popliteal and posterior tibial veins.Acute deep venous thrombosis extending from the left femoral vein through the popliteal and calf veins.  Started heparin gtt after the admission.     Lab significant for Hb8.6 wbc 17.2 plt 180 MCV 85 Cr 4.3; ALT 41 AST 99; ;   -Phos Mg 2.9 Uric acid 14.9 this morning lab on 9/7 after one dose of rasburicase 6mg IV given.      #Abdominal mass with suspected carcinomatosis   -check Tumor marker AFP, , CEA,   -IR consult for potential biopsy  -Abdominal ultrasound   -CT chest without contrast (due to MESHA)   -GI consult in view of black stool and suspected GI bleeding   -age appropriate cancer screen as outpt with her PCP and primary team   -discussed with pt and her family including daughter, sisters at the bedside about her current conditions and management plan. Pt and family agree with the plan.     #DVT   -continue with Heparin gtt for now; closely monitoring Hb (at least Q12hr lab); active type and screen; transfuse if Hb<7,   -if pt has evidence of GI bleeding, may consider IVC filter   -Pt need to be on long term AC if biopsy pathology confirmed as malignancy       #Increased uric acid, phosphorus, MESHA   -suspecting TLS; rasburicase 6mg given last night 9/6 by on call oncology fellow   -Uric acid down to 14; will give another dose of rasburicase 6mg IV(ordered by onc fellow) today 9/7  -renal dosed allopurinol 100mg PO daily   -TLS lab including BMP, phos, Mg, LDH, uric acid q12hr for now;   -check coagulation panel including fibrinogen, D-dimer   -check G-6-phosphate dehydrogenase   -Renal consult for MESHA   -c/w IVF; avoid IV contrast and renal toxic agents/meds.       Note is not finalized until signed by attending   Marin Villa PGY6   hem & onc fellow [phone] or TEAM

## 2023-09-07 NOTE — DIETITIAN INITIAL EVALUATION ADULT - PROBLEM SELECTOR PLAN 7
Imaging showing liver mets and labs with signs of synthetic dysfunction (low albumin, elevated INR, low-normal PLT). Also with mild transaminitis, marked alk phos, and normal bilirubin  -Alk phos elevation likely non-hepatic in origin given cancer and normal bili. therefore more c/w hepatocellular injury pattern  -Likely iso liver metastases  -ctm  -monitor for signs of bleeding

## 2023-09-07 NOTE — DIETITIAN INITIAL EVALUATION ADULT - PERSON TAUGHT/METHOD
Patient was Verbally educated on Potassiums and Phosphorus containing foods. Handout was given too patient on Potassium and Phosphorus./verbal instruction/written material/patient instructed Patient was Verbally educated on Potassiums and Phosphorus containing foods. Handout was given to patient on Potassium and Phosphorus./verbal instruction/written material/patient instructed

## 2023-09-07 NOTE — PROGRESS NOTE ADULT - PROBLEM SELECTOR PLAN 6
WBC 21.55, up from 12.27 on 07/26. No bands, but ANC doubled over this timeframe 9.63->17.80. Pt without other infectious symptoms, so likely reactive   -f/u cultures   -f/u CXR  -RVP neg   -monitor off antibiotics Pt w/ acute b/l LE DVTs on ultrasound. Likely hypercoagulable in setting of malignancy.   -c/w Heparin gtt and monitor PTT  -If renal function normalizes, consider transition to Lovenox vs DOAC Pt w/ acute b/l LE DVTs on ultrasound. Likely hypercoagulable in setting of malignancy.   -c/w Heparin gtt and monitor PTT  -monitor closely for bleeding   -plan to eventually transition to DOAC

## 2023-09-07 NOTE — PROGRESS NOTE ADULT - ASSESSMENT
57yo Female w/ MHx of HTN a/w MESHA, severe uremia and acute DVT i/s/o newly found carcinomatosis with liver metastases

## 2023-09-07 NOTE — PROGRESS NOTE ADULT - PROBLEM SELECTOR PLAN 1
Acute, severe; SCr 4.7 this admission, up from baseline of 0.97 on 07/26/2023. Most likely multifactorial, pre-renal iso severe dehydration 2/2 persistent diarrhea and poor PO intake with  intra-renal iso severe hyperuricemia, hyperphosphatemia vs renal vein thrombosis;  -s/p 2L LR in ED  -f/u repeat CMP with next PTT  -f/u serum/urine osmol, UA, and urine lytes  -Renally dose medications  -strict I/Os  -IV hydration with no potassium supp  -f/u Renal duplex r/o renal vein thrombosis   -Renal diet: no concentrated phos and potassium Severe MESHA, up from baseline of 0.97 on 07/26/2023.   MESHA likely pre-renal iso severe dehydration 2/2 persistent diarrhea and poor PO intake versus ATN   -s/p 2L LR in ED, c/w IV fluids   -No hydro on CT A/P   -avoid nephrotoxic agents   -f/u Renal duplex   -monitor Cr Severe MESHA, up from baseline of 0.97 on 07/26/2023.   MESHA likely pre-renal iso severe dehydration 2/2 persistent diarrhea and poor PO intake versus ATN   -Cr improving   -FeNa 0.3%, consistent w/ pre-renal etiology   -s/p 2L LR in ED, c/w IV hydration   -No hydro on CT A/P   -avoid nephrotoxic agents   -monitor Cr

## 2023-09-07 NOTE — PROGRESS NOTE ADULT - PROBLEM SELECTOR PLAN 10
DVT: heparin gtt full a/c  Diet: regular Pt on 3-drug regimen at home: Amlodipine 10, Lisinopril 40, and Metoprolol 25  -Soft BP's, holding BP meds   -monitor BP

## 2023-09-07 NOTE — DIETITIAN INITIAL EVALUATION ADULT - PROBLEM SELECTOR PLAN 5
Pt w/ BLE DVTs on ultrasound. Currently no respiratory symptoms c/f PE  -c/w Heparin gtt full a/c iso acute renal failure     -If renal function normalizes, consider transition to Lovenox vs DOAC  -If new respiratory symptoms, consider V/Q scan

## 2023-09-07 NOTE — CONSULT NOTE ADULT - SUBJECTIVE AND OBJECTIVE BOX
HPI:  59yo Female w/ mhx HTN who is p/w several months of worsening diarrhea and weight loss ~30lb in 3 months. Pt was seen in ED ~1 month ago for black stools, n/v, and occasional diarrhea. Labs were grossly wnl, so pt dc'd with omeprazole and GI f/u for EGD/colonoscopy. Since then n/v have resolved, but pt continues to have ~6 episodes of diarrhea w/ mucous every day, and she has noticed the color of her stool becoming progressively darker. This has been accompanied by anorexia, severe fatigue, and progressive non-painful swelling of LLE. Over the past several months she has lost ~30lb (10-15lb in the last month alone) and occasional night sweats. Reports no fever, chills, headache, chest/abdominal pain, dyspnea, cough, dizziness, changes in urination, dysuria. (06 Sep 2023 19:49)    During the encounter she reported that she was scheduled for EGD/colonoscopy in 2023. She did not have previous colonscopy. She can not remember when was her last PAP smear and mammogram. She noticed Left leg swelling on 9/3/23 without erythema and tenderness. Denied SOB, chest pain, and previous history of blood clots. Denied estrogen hormonal med/supplements use. Manopausal with last period at age of 50. Denied recent vaginal bleeding.     After admission, CT abd/p without contrast on 23 showing Limited noncontrast study. evidence of metastatic disease characterized by multiple bilobar liver lesions and peritoneal carcinomatosis/malignant ascites. Several areas of colonic wall thickening, question serosal implants involving those organs versus primary neoplasm. Fissural nodularity and subpleural and tubular nodules at the visualized lung bases, nonspecific.     Duplex on Sandra Acute deep venous thrombosis: above and below the knee. Acute deep venous thrombosis in the right popliteal and posterior tibial veins.Acute deep venous thrombosis extending from the left femoral vein through the popliteal and calf veins.    Started heparin gtt after the admission.     Lab significant for Hb8.6 wbc 17.2 plt 180 MCV 85 Cr 4.3; ALT 41 AST 99; ;   -Phos Mg 2.9 Uric acid 14.9 this morning lab on  after one dose of rasburicase 6mg IV given.        Family history denied family history of cancers   Social history denied ETOH/smoking/recreational drug use, she works as a . [facility] her  at [facility].             PAST MEDICAL & SURGICAL HISTORY:  HTN (hypertension)      History of           Allergies    No Known Allergies    Intolerances        MEDICATIONS  (STANDING):  heparin  Infusion. 1100 Unit(s)/Hr (11 mL/Hr) IV Continuous <Continuous>  influenza   Vaccine 0.5 milliLiter(s) IntraMuscular once  pantoprazole    Tablet 40 milliGRAM(s) Oral before breakfast  rasburicase IVPB 6 milliGRAM(s) IV Intermittent once  sertraline 25 milliGRAM(s) Oral daily  sevelamer carbonate 800 milliGRAM(s) Oral three times a day  sodium chloride 0.9%. 1000 milliLiter(s) (125 mL/Hr) IV Continuous <Continuous>  traZODone 50 milliGRAM(s) Oral at bedtime    MEDICATIONS  (PRN):  acetaminophen     Tablet .. 650 milliGRAM(s) Oral every 6 hours PRN Temp greater or equal to 38C (100.4F), Mild Pain (1 - 3)  aluminum hydroxide/magnesium hydroxide/simethicone Suspension 30 milliLiter(s) Oral every 4 hours PRN Dyspepsia  heparin   Injectable 7000 Unit(s) IV Push every 6 hours PRN For aPTT less than 40  heparin   Injectable 3500 Unit(s) IV Push every 6 hours PRN For aPTT between 40 - 57  melatonin 3 milliGRAM(s) Oral at bedtime PRN Insomnia  ondansetron Injectable 4 milliGRAM(s) IV Push every 8 hours PRN Nausea and/or Vomiting      FAMILY HISTORY:  No pertinent family history in first degree relatives        SOCIAL HISTORY: No EtOH, no tobacco    REVIEW OF SYSTEMS:    see HPI     Height (cm): 177.8 ( @ 05:38)  Weight (kg): 89.8 ( @ 15:38)  BMI (kg/m2): 28.4 ( @ :38)  BSA (m2): 2.08 ( @ :38)    T(F): 98 (23 @ 09:30), Max: 98.1 (23 @ 05:38)  HR: 90 (23 @ 09:30)  BP: 109/69 (23 @ 09:30)  RR: 17 (23 @ 09:30)  SpO2: 100% (23 @ 09:30)  Wt(kg): --    GENERAL: NAD, well-developed; AAOX3   HEAD:  Atraumatic, Normocephalic  EYES: EOMI,  pale conjunctiva; anicteric   NECK: Supple, No JVD  CHEST/LUNG: Clear to auscultation bilaterally; No wheeze  HEART: Regular rate and rhythm; No murmurs, rubs, or gallops  ABDOMEN: Soft, Nontender, obese abdomen  Bowel sounds present  EXTREMITIES:  2+ Peripheral Pulses, No clubbing, left leg edema  NEUROLOGY: non-focal  SKIN: No rashes or lesions                          8.6    17.21 )-----------( 180      ( 07 Sep 2023 06:28 )             27.1           129<L>  |  92<L>  |  96<H>  ----------------------------<  113<H>  4.4   |  17<L>  |  4.37<H>    Ca    8.4      07 Sep 2023 06:28  Phos  6.3       Mg     2.90         TPro  6.3  /  Alb  2.6<L>  /  TBili  0.5  /  DBili  x   /  AST  99<H>  /  ALT  41<H>  /  AlkPhos  550<H>        Magnesium: 2.90 mg/dL ( @ 06:28)  Phosphorus: 6.3 mg/dL ( @ 06:28)  Uric Acid: 14.5 mg/dL ( @ 06:28)  Magnesium: 3.10 mg/dL ( @ 14:26)  Phosphorus: 7.1 mg/dL ( @ 14:26)  Uric Acid: 22.2 mg/dL ( @ 14:26)      PT/INR - ( 07 Sep 2023 06:28 )   PT: 12.8 sec;   INR: 1.15 ratio         PTT - ( 07 Sep 2023 06:28 )  PTT:134.9 sec    .Stool Feces   @ 19:14   Testing in progress  --  --

## 2023-09-07 NOTE — CONSULT NOTE ADULT - ATTENDING COMMENTS
57 y/o woman with liver lesions, peritoneal lesions, hematochezia, bilateral DVT, thickened colonic wall. Clinical picture is consistent with metastatic colorectal cancer which may be bleeding from a colonic mass. IR biopsy liver pending. Appreciate GI evaluation with visualization and biopsy if feasible of thickened colonic wall. Agree w fellow note above. Will follow closely.  Octaviano Edward MD PhD  Oncology Attending
Patient seen/examined. Assessment/recommendations as noted. Several month history of decreased appetite, 30 pound weight loss, heartburn, early satiety and diarrhea described as several stools of varying color daily but no overt red blood (at times observes black stool but has also taken Pepto-Bismol). Chronically ill-appearing but otherwise comfortable/NAD. Abdomen-obese, soft, nontender and without palpable mass. JUNIOR-consistent with extrinsic mass but no palpable intraluminal rectal lesion detected. Scant light brown stool. No blood. Labs/CT as noted. Imaging consistent with extensive peritoneal carcinomatosis and likely malignant ascites associated with metastatic lesions in liver. Mass like lesions noted in the upper rectum and distal sigmoid but unclear if primary neoplasm and intraluminal versus serosal implants. Case discussed with oncology team and indicates plan for IR guided biopsy (possibly omental implant or liver) for histologic diagnosis. From GI standpoint will plan for upper endoscopy and partial colonoscopy (patient declined full colonoscopy as doesn't think she would be able to comply with prep) after further optimization including improvement in hyperuricemia and tumor lysis syndrome. Pantoprazole 40 mg b.i.d. Monitor PTT with avoidance of supratherapeutic anticoagulation. Monitor serial hemoglobin/hematocrit. Check CEA and CA 19-9.

## 2023-09-07 NOTE — PROGRESS NOTE ADULT - PROBLEM SELECTOR PLAN 7
-Likely d/t liver metastases  -trend LFT's WBC 21.55, up from 12.27 on 07/26. No bands, but ANC doubled over this timeframe 9.63->17.80. Pt without other infectious symptoms, so likely reactive   -f/u cultures   -f/u CXR  -RVP neg   -monitor off antibiotics

## 2023-09-07 NOTE — PROGRESS NOTE ADULT - PROBLEM SELECTOR PLAN 5
Pt w/ acute b/l LE DVTs on ultrasound. Likely hypercoagulable in setting of malignancy.   -c/w Heparin gtt and monitor PTT  -If renal function normalizes, consider transition to Lovenox vs DOAC Likely hypovolemic hyponatremia   -urine Na 22  -c/w IVF for now   -monitor Na

## 2023-09-07 NOTE — DIETITIAN INITIAL EVALUATION ADULT - PROBLEM SELECTOR PROBLEM 3
Benzoyl Peroxide Pregnancy And Lactation Text: This medication is Pregnancy Category C. It is unknown if benzoyl peroxide is excreted in breast milk. Mucoid diarrhea

## 2023-09-07 NOTE — PROGRESS NOTE ADULT - PROBLEM SELECTOR PLAN 4
Pt with hyponatremia, hypochloremia, hypocalcemia, hypermagnesemia, and marked hyperphosphatemia. Also with decreased HCO3 and elevated AG, but without acidosis on VBG. Likely 2/2 combination of diarrhea, poor PO intake, and kidney/liver dysfunction. Also concerning for tumor lysis syndrome  -Uric Acid 22.2, Phos 7.0, K 4.7     -c/f TLS; K may be falsely low iso diarrhea     -Per Onc, more likely 2/2 kidney dysfunction     -Rasburicase 6mg x1, Sevelamer tid     -Heme/Onc note and recs reviewed;   -f/u repeat CMP  -Monitor on telemetry  -q4h VS -suspecting TLS   -Increased uric acid, phosphorus  -rasburicase 6mg given last night   -Uric acid down to 14; giving another dose of rasburicase 6mg IV  -renal dosed allopurinol 100mg PO daily   -c/w IVF  -TLS lab including BMP, phos, Mg, LDH, uric acid q12hr   -check G-6-phosphate dehydrogenase

## 2023-09-07 NOTE — DIETITIAN INITIAL EVALUATION ADULT - PROBLEM SELECTOR PLAN 3
per pt, resistant to pepto-bismol and imodium. Likely 2/2 partial metastatic invasion of colon vs less likely infectious etiology  -Stool counts  -Strict I/O q4h  -f/u stool electrolytes, stool culture, GI PCR, C diff PCR  -ensure adequate hydration     -s/p 2L LR in ED     -c/w NS 125cc/hr  -monitor for BRBPR given hx of +FOBT and on heparin gtt

## 2023-09-07 NOTE — PROGRESS NOTE ADULT - PROBLEM SELECTOR PLAN 9
Pt on 3-drug regimen at home: Amlodipine 10, Lisinopril 40, and Metoprolol 25  -Soft BP's, holding BP meds   -monitor BP Likely 2/2 chronic GI bleed given hx of positive FOBT, colonic invasion by malignancy, and dark stools  -monitor CBC  -GI consult   -transfuse for Hgb<7 Likely 2/2 chronic GI bleed given hx of positive FOBT, possible colonic invasion by malignancy, and prior dark stools  -no gross bleeding at this time   -monitor CBC  -GI consulted   -transfuse for Hgb<7

## 2023-09-08 NOTE — PROGRESS NOTE ADULT - PROBLEM SELECTOR PLAN 10
Pt on 3-drug regimen at home: Amlodipine 10, Lisinopril 40, and Metoprolol 25  -Soft BP's, holding BP meds   -monitor BP

## 2023-09-08 NOTE — PROGRESS NOTE ADULT - PROBLEM SELECTOR PLAN 9
Likely 2/2 chronic GI bleed given hx of positive FOBT, possible colonic invasion by malignancy, and prior dark stools  -no gross bleeding at this time   -monitor CBC, transfuse for Hgb<7  -c/w PPI   -GI consult appreciated   -plan for EGD and sigmoidoscopy likely Monday

## 2023-09-08 NOTE — PROGRESS NOTE ADULT - PROBLEM SELECTOR PLAN 4
-suspecting TLS   -Increased uric acid, phosphorus  -rasburicase 6mg given x 2   -start allopurinol 100mg PO daily   -c/w IVF  -TLS lab including BMP, phos, Mg, LDH, uric acid q12hr   -f/up G-6-phosphate dehydrogenase

## 2023-09-08 NOTE — PROGRESS NOTE ADULT - PROBLEM SELECTOR PLAN 3
Likely 2/2 partial metastatic invasion of colon vs less likely infectious etiology  -Improving   -stool culture NGTD, stool ova and parasite neg    -awaiting GI PCR, C diff PCR  -ensure adequate hydration

## 2023-09-08 NOTE — PROGRESS NOTE ADULT - PROBLEM SELECTOR PLAN 1
Severe MESHA, up from baseline of 0.97 on 07/26/2023.   MESHA likely pre-renal iso severe dehydration 2/2 persistent diarrhea and poor PO intake   -Cr improving   -FeNa 0.3%, consistent w/ pre-renal etiology   -s/p 2L LR in ED, c/w IV hydration   -No hydro on CT A/P   -avoid nephrotoxic agents   -monitor Cr

## 2023-09-08 NOTE — PROGRESS NOTE ADULT - PROBLEM SELECTOR PLAN 6
Pt w/ acute b/l LE DVTs on ultrasound. Likely hypercoagulable in setting of malignancy.   -c/w Heparin gtt and monitor PTT  -monitor closely for bleeding   -plan to eventually transition to DOAC

## 2023-09-08 NOTE — PROGRESS NOTE ADULT - PROBLEM SELECTOR PLAN 2
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   -Oncology input appreciated   -CT chest with scattered lung nodules concerning for metastasis   -IR consulted for potential biopsy, recommends CT A/P w/ IV contrast once renal function improves or MRI A/P   -GI consulted, plan for EGD and sigmoidoscopy likely Monday

## 2023-09-08 NOTE — PROGRESS NOTE ADULT - PROBLEM SELECTOR PLAN 7
WBC 21.55, up from 12.27 on 07/26.   Pt without other infectious symptoms, so likely reactive   -blood cultures NGTD   -CXR w/ possible right perihilar opacity  -CT chest w/ scattered nodularity c/f metastasis. Right cardiophrenic lymph nodes. Several nodules measuring up to 9 mm adjacent to the pericardium. No consolidation   -RVP neg   -monitor off antibiotics

## 2023-09-09 NOTE — PROGRESS NOTE ADULT - PROBLEM SELECTOR PLAN 3
Likely 2/2 partial metastatic invasion of colon vs less likely infectious etiology  -Improving   -stool culture NGTD, stool ova and parasite neg    -GI PCR, C diff PCR negative   -ensure adequate hydration

## 2023-09-09 NOTE — PROGRESS NOTE ADULT - ASSESSMENT
59yo Female w/ MHx of HTN a/w MESHA, severe uremia and acute DVT i/s/o newly found carcinomatosis with liver metastases

## 2023-09-09 NOTE — PROGRESS NOTE ADULT - PROBLEM SELECTOR PLAN 2
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   -Oncology input appreciated   -CT chest with scattered lung nodules concerning for metastasis   -IR consulted for potential biopsy, unable to obtain CT A/P w/ IV contrast d/t MESHA so MRI A/P ordered   -GI consulted, plan for EGD and sigmoidoscopy likely Monday

## 2023-09-09 NOTE — PROGRESS NOTE ADULT - PROBLEM SELECTOR PLAN 4
-possible TLS   -Increased uric acid and phosphorus, improved   -rasburicase 6mg given x 2   -started allopurinol 100mg PO daily   -c/w IVF  -monitor TLS labs daily

## 2023-09-10 NOTE — PROGRESS NOTE ADULT - ASSESSMENT
This is a 57 y/o F with PMHx of HTN who presented to the ED for evaluation of diarrhea and weight loss. Patient noted ongoing diarrhea for the past 1-2 months alongside significant weight loss. On CT A/P noncon, found to have evidence of metastatic disease w/ multiple liver lesions, peritoneal carcinomatosis/malignant ascites and several areas of colonic wall thickening w/ unknown primary.     #Metastatic disease of unknown origin  #Diarrhea   Patient presented w/ diarrhea, sig weight loss, early satiety and poor appetite. Patient on imaging was found to have evidence of metastatic disease, possibly colon as primary. Several areas of colonic wall thickening, serosal implants vs primary neoplasm. IR consulted for potential biopsy, either from liver or omental implant. Patient expressed concern about ability to tolerate prep. Given masslike thickening in upper rectum/distal sigmoid, will proceed with flex sig on Monday with Enema prep in the AM.     #UGIB   #Anemia   Patient w/ 1-2 months of reflux, n/v, poor appetite and diarrhea (dark brown or black in color). Hgb drop from 11.5 in 7/2023 to 10 on current admission in 9/2023. Concern for GIB though pt did take Pepto-Bismol which could cause black stools. JUNIOR w/ light brown stool, no blood.    Recommendations:   - Will proceed with EGD + flex sig on Monday  - NPO after midnight on Sunday  - Give 2 tap water enema 30 minutes apart about 1 hour before procedure  - Please ensure morning labs are drawn at 4am, electrolytes repleted as necessary, and Hg>7  - Maintain 2 large bore IV access  - Maintain active T&S  - PO PPI BID  - Trend CBC, transfuse w/ hgb goal >7     Case discussed with Dr. German Quiroz  GI/Hepatology Fellow    MONDAY-FRIDAY 8AM-5PM:  Pager# [phone] (J) or [phone] ([facility])    NON-URGENT CONSULTS:  Please email [email or web address] OR [email or web address]  AT NIGHT AND ON WEEKENDS:  Contact on-call GI fellow via answering service ([phone]) from 5pm-8am and on weekends/holidays

## 2023-09-10 NOTE — PROGRESS NOTE ADULT - PROBLEM SELECTOR PLAN 3
Likely 2/2 partial metastatic invasion of colon   -Improving   -stool culture NGTD, stool ova and parasite neg    -GI PCR, C diff PCR negative   -ensure adequate hydration

## 2023-09-10 NOTE — PROGRESS NOTE ADULT - PROBLEM SELECTOR PLAN 1
Severe MESHA, up from baseline of 0.97 on 07/26/2023.   MESHA likely pre-renal in setting of persistent diarrhea and poor PO intake   -Cr improving   -FeNa 0.3%, consistent w/ pre-renal etiology   -c/w IV hydration   -No hydro on CT A/P   -avoid nephrotoxic agents   -monitor Cr

## 2023-09-10 NOTE — PROGRESS NOTE ADULT - PROBLEM SELECTOR PLAN 2
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   -Oncology input appreciated   -CT chest with scattered lung nodules concerning for metastasis   -IR consulted for potential biopsy, unable to obtain CT A/P w/ IV contrast d/t MESHA, ordered MRI A/P   -GI consulted, plan for EGD and sigmoidoscopy on Monday

## 2023-09-10 NOTE — PROGRESS NOTE ADULT - PROBLEM SELECTOR PLAN 9
Likely 2/2 chronic GI bleed given hx of positive FOBT, possible colonic invasion by malignancy, and prior dark stools  -no gross bleeding at this time   -monitor CBC, transfuse for Hgb<7  -c/w PPI BID  -GI consult appreciated   -plan for EGD and sigmoidoscopy on Monday

## 2023-09-11 NOTE — PROGRESS NOTE ADULT - PROBLEM SELECTOR PLAN 1
Severe EMSHA, up from baseline of 0.97 on 07/26/2023.   MESHA likely pre-renal in setting of persistent diarrhea and poor PO intake   -Cr improving   -FeNa 0.3%, consistent w/ pre-renal etiology   -No hydro on CT A/P   -avoid nephrotoxic agents   -monitor Cr  --c/w IV hydration

## 2023-09-11 NOTE — PROGRESS NOTE ADULT - ASSESSMENT
59yo Female w/ MHx of HTN a/w MESHA, severe uremia and acute DVT i/s/o newly found carcinomatosis with liver metastases.       Physical Exam:  GENERAL: no apparent distress  CHEST/LUNG: on RA; Clear to auscultation bilaterally; No wheezing; No crackles  HEART: Regular rate and rhythm; S1/S2 wnl; no obvious murmurs  ABDOMEN: Soft, Nontender, Nondistended; Bowel sounds present  EXTREMITIES:  2+ Peripheral Pulses, No edema  PSYCH: normal affect, calm demeanor  NEUROLOGY: AAOX3, CN 2-12 grossly intact, no obvious FND

## 2023-09-11 NOTE — PROGRESS NOTE ADULT - PROBLEM SELECTOR PLAN 9
Likely 2/2 chronic GI bleed given hx of positive FOBT, possible colonic invasion by malignancy, and prior dark stools  -no gross bleeding at this time   -monitor CBC, transfuse for Hgb<7  -c/w PPI BID  -GI consult appreciated   -plan for EGD and sigmoidoscopy

## 2023-09-11 NOTE — PROGRESS NOTE ADULT - PROBLEM SELECTOR PLAN 2
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   -Oncology input appreciated   -CT chest with scattered lung nodules concerning for metastasis   -IR consulted for potential biopsy, unable to obtain CT A/P w/ IV contrast d/t MESHA, ordered MRI A/P   -GI consulted, plan for EGD and sigmoidoscopy today  - f/u procedure report.

## 2023-09-12 NOTE — PROGRESS NOTE ADULT - ASSESSMENT
59 y/o F with PMHx of HTN who presented to the ED for evaluation of diarrhea and weight loss. Patient noted ongoing diarrhea for the past 1-2 months alongside significant weight loss. On CT A/P noncon, found to have evidence of metastatic disease w/ multiple liver lesions, peritoneal carcinomatosis/malignant ascites and several areas of colonic wall thickening w/ unknown primary.     #Rectosigmoid mass with evidence of metastatic disease  #Diarrhea   #Anemia  Patient presented w/ diarrhea, sig weight loss, early satiety and poor appetite. Patient on imaging was found to have evidence of metastatic disease, possibly colon as primary. Several areas of colonic wall thickening, serosal implants vs primary neoplasm. IR consulted for potential biopsy, either from liver or omental implant. Patient expressed concern about ability to tolerate prep. Given masslike thickening in upper rectum/distal sigmoid, will proceed with flex sig on Monday with Enema prep in the AM.   -EGD/flex sig 9/11/2023 LA Grade B esophagitis, non-obstructing Schatzki ring, small hiatal hernia, normal examined duodenum, likely malignant partially obstructing tumor in the recto-sigmoid colon. Biopsied.    Recommendations:  -trend clinical symptoms, exam findings, vital signs, CBC, CMP  -f/u path  -recommend medical and surgical oncology consultations  -no further plans for anticipated inpatient GI interventions    Note incomplete until finalized by attending signature/attestation.    Bobo Castillo  GI/Hepatology Fellow    MONDAY-FRIDAY 8AM-5PM:  Pager# [phone] ([facility]) or [phone] ([facility])    NON-URGENT CONSULTS:  Please email [email or web address] OR [email or web address]  AT NIGHT AND ON WEEKENDS:  Contact on-call GI fellow via answering service ([phone]) from 5pm-8am and on weekends/holidays

## 2023-09-12 NOTE — PROGRESS NOTE ADULT - ASSESSMENT
57yo Female w/ MHx of HTN a/w MESHA, severe uremia and acute DVT i/s/o newly found carcinomatosis with liver metastases.       Physical Exam:  GENERAL: no apparent distress  CHEST/LUNG: on RA; Clear to auscultation bilaterally; No wheezing; No crackles  HEART: Regular rate and rhythm; S1/S2 wnl; no obvious murmurs  ABDOMEN: Soft, Nontender, Nondistended; Bowel sounds present  EXTREMITIES:  2+ Peripheral Pulses, No edema  PSYCH: normal affect, calm demeanor  NEUROLOGY: AAOX3, CN 2-12 grossly intact, no obvious FND

## 2023-09-12 NOTE — PROGRESS NOTE ADULT - PROBLEM SELECTOR PLAN 9
Likely 2/2 chronic GI bleed given hx of positive FOBT, possible colonic invasion by malignancy, and prior dark stools  -no gross bleeding at this time   -monitor CBC, transfuse for Hgb<7  -c/w PPI BID  -s/p EGD and flexsig on 9/11.

## 2023-09-12 NOTE — PROGRESS NOTE ADULT - PROBLEM SELECTOR PLAN 2
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   -Oncology input appreciated   -CT chest with scattered lung nodules concerning for metastasis   -IR consulted for potential biopsy, unable to obtain CT A/P w/ IV contrast d/t MESHA, ordered MRI A/P   -GI consulted, s/p EGD and sigmoidoscopy. mass biopsied. f/u path.   - Surg onc consult.   - MRI showing Pancreatic lesions, maria elena for pancreatic adenoca. f/u pathology.

## 2023-09-12 NOTE — PROGRESS NOTE ADULT - ATTENDING COMMENTS
58-year-old female with HTN presented initially for diarrhea/weight loss, found to have metastatic disease with multiple liver lesions, peritoneal carcinomatosis, malignant ascites status post flexible sigmoidoscopy found to have likely malignant partially obstructing tumor in the rectosigmoid colon, status post biopsies    Impression  Likely primary colon cancer    Recommendation  1.  Patient will need staging with CT imaging  2.  Await pathology results  3.  Call with questions
Agree w above. 59 yo w mets of unknown primary. Patient expressing concern for tolerating colonic prep. Plan for EGD/colon tomorrow.
Overall no change- appetite decreased with early satiety but no N/V or abd. pain. No further diarrhea or melena- reports 1 small brown stool. Clinical picture c/w advanced metastatic malignancy with peritoneal carcinomatosis and liver mets. Rec. as noted: pantoprazole 40mg/d, serial H/H AND liver enzymes with plan for EGD/flex sig when optimized. Await CEA, .

## 2023-09-12 NOTE — PROGRESS NOTE ADULT - PROBLEM SELECTOR PLAN 1
Severe MESHA, up from baseline of 0.97 on 07/26/2023.   MESHA likely pre-renal in setting of persistent diarrhea and poor PO intake   -Cr improving   -FeNa 0.3%, consistent w/ pre-renal etiology   -No hydro on CT A/P   -avoid nephrotoxic agents   -monitor Cr  --c/w IV hydration

## 2023-09-13 NOTE — CONSULT NOTE ADULT - CONSULT REASON
Rectosigmoid mass
liver masses and likely carcinomatosis on CT
Pancreatic mass on MRI
diarrhea, melena, metastatic disease on CT
Biopsy

## 2023-09-13 NOTE — PROGRESS NOTE ADULT - NUTRITIONAL ASSESSMENT
This patient has been assessed with a concern for Malnutrition and has been determined to have a diagnosis/diagnoses of Moderate protein-calorie malnutrition.    This patient is being managed with:   Diet Clear Liquid-  Entered: Sep 11 2023  8:14PM  
This patient has been assessed with a concern for Malnutrition and has been determined to have a diagnosis/diagnoses of Moderate protein-calorie malnutrition.    This patient is being managed with:   Diet NPO after Midnight-     NPO Start Date: 10-Sep-2023   NPO Start Time: 23:59  Except Medications  Entered: Sep 10 2023  3:07PM    Diet NPO after Midnight-     NPO Start Date: 10-Sep-2023   NPO Start Time: 23:59  Except Medications  Entered: Sep 10 2023  2:14PM    Diet Regular-  No Concentrated Potassium  No Concentrated Phosphorus  Entered: Sep  7 2023 12:06AM  
This patient has been assessed with a concern for Malnutrition and has been determined to have a diagnosis/diagnoses of Moderate protein-calorie malnutrition.    This patient is being managed with:   Diet NPO after Midnight-     NPO Start Date: 10-Sep-2023   NPO Start Time: 23:59  Except Medications  Entered: Sep 10 2023  3:07PM    Diet NPO after Midnight-     NPO Start Date: 10-Sep-2023   NPO Start Time: 23:59  Except Medications  Entered: Sep 10 2023  2:14PM    Diet Regular-  No Concentrated Potassium  No Concentrated Phosphorus  Entered: Sep  7 2023 12:06AM  
This patient has been assessed with a concern for Malnutrition and has been determined to have a diagnosis/diagnoses of Moderate protein-calorie malnutrition.    This patient is being managed with:   Diet Clear Liquid-  Entered: Sep 11 2023  8:14PM  
This patient has been assessed with a concern for Malnutrition and has been determined to have a diagnosis/diagnoses of Moderate protein-calorie malnutrition.    This patient is being managed with:   Diet Regular-  No Concentrated Potassium  No Concentrated Phosphorus  Entered: Sep  7 2023 12:06AM  
This patient has been assessed with a concern for Malnutrition and has been determined to have a diagnosis/diagnoses of Moderate protein-calorie malnutrition.    This patient is being managed with:   Diet Regular-  No Concentrated Potassium  No Concentrated Phosphorus  Entered: Sep  7 2023 12:06AM

## 2023-09-13 NOTE — CONSULT NOTE ADULT - SUBJECTIVE AND OBJECTIVE BOX
SURGICAL ONCOLOGY CONSULT NOTE    HPI: 58F PMH HTN p/t ED with 30lbs weight loss in 3 months and diarrhea. Of note, patient presented last month for +FOBT, n/v, and diarrhea, discharged without scan as labs relatively normal with recommendation of following up with GI for EGD/colonoscopy. Patient did not follow-up with GI. States that patient has never has colonoscopy. At bedside, pt denies fever, chills, n/v, CP, SOB, abdominal pain. Currently passing gas, small stools yesterday, voiding appropriately.     During this admission, patient diagnosed with b/l DVTs, started on heparin drip. IR consulted for peritoneal biopsy but deferred at this time 2/2 heparin drip. Imaging shows pulmonary nodules suggestive of mets, peritoneal carcinomatosis liver lesions, cardiophrenic lymph node enhancement and MRI shows 2.4 cm pancreatic mass.      Surgical Oncology consulted for pancreatic mass noted on MRI     PMH/PSH: HTN (hypertension)    History of        MEDS:allopurinol 100 milliGRAM(s) Oral daily  heparin  Infusion. 1100 Unit(s)/Hr (11 mL/Hr) IV Continuous <Continuous>  influenza   Vaccine 0.5 milliLiter(s) IntraMuscular once  pantoprazole    Tablet 40 milliGRAM(s) Oral two times a day  sertraline 25 milliGRAM(s) Oral daily  sodium chloride 0.9%. 1000 milliLiter(s) (100 mL/Hr) IV Continuous <Continuous>  traZODone 50 milliGRAM(s) Oral at bedtime  acetaminophen     Tablet .. 650 milliGRAM(s) Oral every 6 hours PRN Temp greater or equal to 38C (100.4F), Mild Pain (1 - 3)  aluminum hydroxide/magnesium hydroxide/simethicone Suspension 30 milliLiter(s) Oral every 4 hours PRN Dyspepsia  heparin   Injectable 7000 Unit(s) IV Push every 6 hours PRN For aPTT less than 40  heparin   Injectable 3500 Unit(s) IV Push every 6 hours PRN For aPTT between 40 - 57  melatonin 3 milliGRAM(s) Oral at bedtime PRN Insomnia  ondansetron Injectable 4 milliGRAM(s) IV Push every 8 hours PRN Nausea and/or Vomiting      ALLERGIES: NKDA    REVIEW OF SYSTEMS: All ROS negative except as per HPI.  ____________________________________________    VITALS:T(C): 36.6, Max: 37.4 ()  T(F): 97.9, Max: 99.3 ()  HR: 90 (86 - 90)  BP: 99/63 (99/63 - 111/77)  BP(mean): --  ABP: --  ABP(mean): --  RR: 17 (17 - 18)  SpO2: 100% (97% - 100%)  CVP(mm Hg): --  CVP(cm H2O): --  room air            PHYSICAL EXAM:  General: AAOx3, no acute distress.  Respiratory: breathing comfortably, no increased WOB   Abdomen: soft, nontender, nondistended, no rebound, no guarding  Extremities: Moves all four. sensory motor intact, +b/l LE  edema  ____________________________________________    LABS:                      8.2  19.56 )-----------( 333    ( 13 Sep 2023 06:55 )             27.5  139  |  112<H>  |  48<H>  ----------------------------<  109<H>    ()  5.0   |  17<L>  |  1.62<H>          Ca    8.7        Mg    2.60  Phos  3.5        LIVER FUNCTIONS - ( 13 Sep 2023 06:55 )  Alb: 2.2 g/dL / Pro: 5.8 g/dL / ALK PHOS: 643 U/L / ALT: 61 U/L / AST: 133 U/L / GGT: x      PTT - ( 13 Sep 2023 06:55 )  PTT:68.1 sec  Urinalysis Basic - ( 13 Sep 2023 06:55 )    Color: x / Appearance: x / SG: x / pH: x  Gluc: 109 mg/dL / Ketone: x  / Bili: x / Urobili: x   Blood: x / Protein: x / Nitrite: x   Leuk Esterase: x / RBC: x / WBC x   Sq Epi: x / Non Sq Epi: x / Bacteria: x      ____________________________________________    RADIOLOGY & ADDITIONAL STUDIES:    < from: CT Abdomen and Pelvis No Cont (23 @ 13:49) >  LOWER CHEST: Coronary artery calcifications. Several tubular opacities in   the right lower lobe with a reference measuring 1.2 cm in the medial   right lower lobe (301:10), nonspecific. Additionally, nodularity of the   visualized fissures, indeterminate.    LIVER: Innumerable bilobar hypoattenuating lesions.  BILE DUCTS: Normal caliber.  GALLBLADDER: Suggestion of layering sludge.  SPLEEN: Within normal limits.  PANCREAS: Within normal limits.  ADRENALS: Within normal limits.  KIDNEYS/URETERS: Within normal limits.    BLADDER: Underdistended.  REPRODUCTIVE ORGANS: Limited evaluation due to surrounding ascites and   noncontrast study.    BOWEL: No bowel obstruction. Appendix is normal. Extensive peritoneal   carcinomatosis with serosal implants involving parts of the bowel.   Ill-defined masslike thickening of the upper rectum and distal sigmoid,   question serosal implants or underlying primary malignancy. Left lateral   to and inseparable from the upper rectum, there is a 2.8 x 2.7 cm   hypoattenuating rounded structure, question tumor deposit/implant versus   extramural tumor.  PERITONEUM: Extensive peritoneal carcinomatosis with a reference tumor   depositin the left hemiabdomen measuring 4.2 x 2.1 cm (301:62). Small to   moderate volume abdominopelvic ascites with peritoneal thickening and   nodularity.  VESSELS: Within normal limits.  RETROPERITONEUM/LYMPH NODES: No lymphadenopathy.  ABDOMINAL WALL:Within normal limits.  BONES: Degenerative changes. Multilevel vertebral body hemangiomata.    IMPRESSION:  Limited noncontrast study.    CT evidence of metastatic disease characterized by multiple bilobar liver   lesions and peritoneal carcinomatosis/malignant ascites. Unknown primary.   Several areas of colonic wall thickening, question serosal implants   involving those organs versus primary neoplasm.    Fissural nodularity and subpleural and tubular nodules at the visualized   lung bases, nonspecific.    < end of copied text >  < from: CT Chest No Cont (23 @ 18:40) >  LUNGS/AIRWAYS/PLEURA: Patent central airways. Nodularity of the lung   fissures measure up to 6 mm. Scattered nodularity, some which is somewhat   tubular measures up to 1.5 cm. Coarse calcification in the right lower   lobe . No pleural effusion. No pneumothorax.    LYMPH NODES/MEDIASTINUM: No lymphadenopathy.    HEART/VASCULATURE: Heart is normal in size. There is no pericardial   effusion. Coronary artery calcifications.The blood pool in the heart is   hypodense relative to the myocardium which can be seen in anemia. There   is a common origin of the innominate artery and left common carotid, a   normal variant.    UPPER ABDOMEN: Numerous indeterminate hypodense lesions in the liver.   Redemonstrated peritoneal implants. Ascites. Right cardiophrenic lymph   nodes. Several nodules measuring up to 9 mm adjacent to the pericardium   in the right cardiophrenic region .    BONES/SOFT TISSUES: L1 vertebral body hemangioma. Degenerative changes.      IMPRESSION:  Visual metastasis. Scattered nodularity, some which is somewhat tubular   measures up to 1.5 cm are indeterminate, metastasis should be considered.    Right cardiophrenic lymph nodes. Several nodules measuring up to 9 mm   adjacent to the pericardium in the right cardiophrenic region that may   represent additional cardiophrenic lymph nodes versus pericardial nodules.    < end of copied text >  < from: MR Abdomen No Cont (23 @ 12:06) >  PROCEDURE:  MRI of the abdomen was performed.  Limited noncontrast T1 and T2 weighted images were obtained. Patient was   unable to complete examination dueto pain.    FINDINGS:  LOWER CHEST: This bilateral pleural effusions.    LIVER: Innumerable confluent moderately T2 hyperintense lesions in the   liver, consistent with metastases.  BILE DUCTS: Normal caliber.  GALLBLADDER: Contour abnormality along the lateral aspect of the   gallbladder fundus likely reflects extrinsic compression due to adjacent   liver metastasis.  SPLEEN: Within normal limits.  PANCREAS: Mildly T2 hyperintense lesion in the body of the pancreas   measures 2.4 x 2.0 cm. No evidence of pancreatic ductal dilatation or   distal atrophy.  ADRENALS: Within normal limits.  KIDNEYS/URETERS: Within normal limits.    VISUALIZED PORTIONS:  BOWEL: Within normal limits.  PERITONEUM: Moderate ascites and peritoneal carcinomatosis, as seen on   prior CT.  VESSELS: Within normal limits.  RETROPERITONEUM/LYMPH NODES: No lymphadenopathy.  ABDOMINAL WALL: Within normal limits.  BONES: Within normal limits.    IMPRESSION:  *  Limited exam.  *  Moderate ascites. Hepatic and peritoneal metastatic disease.  *  Mass in the body of the pancreas measuring 2.4 cm suggest pancreatic   adenocarcinoma.    < end of copied text >    < from: Flexible Sigmoidoscopy (23 @ 15:28) >  Findings:       A fungating partially obstructing large mass was found in the        recto-sigmoid colon. The mass was partially circumferential (involving        two-thirds of the lumen circumference). The pediatric colonoscope could        not be advanced beyond the mass without significant resistance, thus        further attempts at advancing were aborted. Oozing was present. Biopsies        were taken with a cold forceps for histology.       Non-bleeding hemorrhoids were found.                                                                                   Impression:          - Likely malignant partially obstructing tumor in the                        recto-sigmoid colon. Biopsied.                       - Non-bleeding hemorrhoids.  Recommendation:      - Return patient to [facility] for [facility] care.                    - Clear liquid diet.                       - Recommend medical and surgical oncology consultations.                       - Follow up pathology results.    < end of copied text >

## 2023-09-13 NOTE — PROGRESS NOTE ADULT - PROBLEM SELECTOR PROBLEM 3
Mucoid diarrhea

## 2023-09-13 NOTE — CONSULT NOTE ADULT - ASSESSMENT
58F PMH HTN p/t ED with 30lbs weight loss in 3 months and diarrhea, likely 2/2 metastatic cancer. Primary cancer currently unknown, biopsy pending.     PLAN   58F PMH HTN p/t ED with 30lbs weight loss in 3 months and diarrhea, likely 2/2 metastatic cancer. Primary cancer currently unknown, biopsy pending.     PLAN  - Final surgical plan pending attending imaging review  - Please add MMR, Foundation, KRAS, BRAF studies to GI pathology samples  - Surgical oncology consult for pancreatic mass on MRI  - Will follow    Discussed with Dr Keely [facility] team  [id number]

## 2023-09-13 NOTE — PROGRESS NOTE ADULT - TIME BILLING
reviewing laboratory data, consultants' recommendations, documentation in [place], performing medically appropriate examinations/evaluations, discussion with patient/family/RN/ACP/Residents and interdisciplinary staff (such as , social workers, etc), counseling patient/family/care giver, ordering medically appropriate medication, tests, or procedures. Interventions were performed as documented above.
reviewing laboratory data, consultants' recommendations, documentation in [place], performing medically appropriate examinations/evaluations, discussion with patient/family/RN/ACP/Residents and interdisciplinary staff (such as , social workers, etc), counseling patient/family/care giver, ordering medically appropriate medication, tests, or procedures. Interventions were performed as documented above.
reviewing laboratory data, consultants' recommendations, documentation in [place], performing medically appropriate examinations/evaluations, discussion with patient/family/RN/ACP/Residents and interdisciplinary staff (such as , social workers, etc), counseling patient/family/care giver, ordering medically appropriate medication, tests, or procedures. Interventions were performed as documented above.

## 2023-09-13 NOTE — PROGRESS NOTE ADULT - PROBLEM SELECTOR PLAN 1
Severe MESHA, up from baseline of 0.97 on 07/26/2023.   MESHA likely pre-renal in setting of persistent diarrhea and poor PO intake   -FeNa 0.3%, consistent w/ pre-renal etiology   -No hydro on CT A/P   -avoid nephrotoxic agents   -monitor Cr  -Cr improving, now plateau around 1.6  - can d/c ivf.

## 2023-09-13 NOTE — PROGRESS NOTE ADULT - PROBLEM SELECTOR PLAN 6
Pt w/ acute b/l LE DVTs on ultrasound. Likely hypercoagulable in setting of malignancy.   -c/w Heparin gtt and monitor PTT  -monitor closely for bleeding   -plan to eventually transition to [facility]AC  - send test script for pricing.

## 2023-09-13 NOTE — PROGRESS NOTE ADULT - PROBLEM SELECTOR PROBLEM 1
Acute renal failure

## 2023-09-13 NOTE — PROGRESS NOTE ADULT - PROBLEM/PLAN-9
DISPLAY PLAN FREE TEXT
Initial (On Arrival)
DISPLAY PLAN FREE TEXT
DISPLAY PLAN FREE TEXT

## 2023-09-13 NOTE — CONSULT NOTE ADULT - SUBJECTIVE AND OBJECTIVE BOX
COLORECTAL SURGERY CONSULT NOTE    HPI: 58F PMH HTN p/t ED with 30lbs weight loss in 3 months and diarrhea. Of note, patient presented last month for +FOBT, n/v, and diarrhea, discharged without scan as labs relatively normal with recommendation of following up with GI for EGD/colonoscopy. Patient did not follow-up with GI. States that patient has never has colonoscopy. At bedside, pt denies fever, chills, n/v, CP, SOB, abdominal pain. Currently passing gas, small stools yesterday, voiding appropriately.     During this admission, patient diagnosed with b/l DVTs, started on heparin drip. IR consulted for peritoneal biopsy but deferred at this time 2/2 heparin drip. Imaging shows pulmonary nodules suggestive of mets, peritoneal carcinomatosis liver lesions, cardiophrenic lymph node enhancement and MRI shows 2.4 cm pancreatic mass.      Colorectal surgery consulted for management of potential malignancy     PMH/PSH: HTN (hypertension)    History of        MEDS:allopurinol 100 milliGRAM(s) Oral daily  heparin  Infusion. 1100 Unit(s)/Hr (11 mL/Hr) IV Continuous <Continuous>  influenza   Vaccine 0.5 milliLiter(s) IntraMuscular once  pantoprazole    Tablet 40 milliGRAM(s) Oral two times a day  sertraline 25 milliGRAM(s) Oral daily  sodium chloride 0.9%. 1000 milliLiter(s) (100 mL/Hr) IV Continuous <Continuous>  traZODone 50 milliGRAM(s) Oral at bedtime  acetaminophen     Tablet .. 650 milliGRAM(s) Oral every 6 hours PRN Temp greater or equal to 38C (100.4F), Mild Pain (1 - 3)  aluminum hydroxide/magnesium hydroxide/simethicone Suspension 30 milliLiter(s) Oral every 4 hours PRN Dyspepsia  heparin   Injectable 7000 Unit(s) IV Push every 6 hours PRN For aPTT less than 40  heparin   Injectable 3500 Unit(s) IV Push every 6 hours PRN For aPTT between 40 - 57  melatonin 3 milliGRAM(s) Oral at bedtime PRN Insomnia  ondansetron Injectable 4 milliGRAM(s) IV Push every 8 hours PRN Nausea and/or Vomiting      ALLERGIES: NKDA    REVIEW OF SYSTEMS: All ROS negative except as per HPI.  ____________________________________________    VITALS:T(C): 36.6, Max: 37.4 ()  T(F): 97.9, Max: 99.3 ()  HR: 90 (86 - 90)  BP: 99/63 (99/63 - 111/77)  BP(mean): --  ABP: --  ABP(mean): --  RR: 17 (17 - 18)  SpO2: 100% (97% - 100%)  CVP(mm Hg): --  CVP(cm H2O): --  room air            PHYSICAL EXAM:  General: AAOx3, no acute distress.  Respiratory: breathing comfortably, no increased WOB   Abdomen: soft, nontender, nondistended, no rebound, no guarding  Extremities: Moves all four. sensory motor intact, +b/l LE  edema  ____________________________________________    LABS:                      8.2  19.56 )-----------( 333    ( 13 Sep 2023 06:55 )             27.5  139  |  112<H>  |  48<H>  ----------------------------<  109<H>    ()  5.0   |  17<L>  |  1.62<H>          Ca    8.7        Mg    2.60  Phos  3.5        LIVER FUNCTIONS - ( 13 Sep 2023 06:55 )  Alb: 2.2 g/dL / Pro: 5.8 g/dL / ALK PHOS: 643 U/L / ALT: 61 U/L / AST: 133 U/L / GGT: x      PTT - ( 13 Sep 2023 06:55 )  PTT:68.1 sec  Urinalysis Basic - ( 13 Sep 2023 06:55 )    Color: x / Appearance: x / SG: x / pH: x  Gluc: 109 mg/dL / Ketone: x  / Bili: x / Urobili: x   Blood: x / Protein: x / Nitrite: x   Leuk Esterase: x / RBC: x / WBC x   Sq Epi: x / Non Sq Epi: x / Bacteria: x      ____________________________________________    RADIOLOGY & ADDITIONAL STUDIES:    < from: CT Abdomen and Pelvis No Cont (23 @ 13:49) >  LOWER CHEST: Coronary artery calcifications. Several tubular opacities in   the right lower lobe with a reference measuring 1.2 cm in the medial   right lower lobe (301:10), nonspecific. Additionally, nodularity of the   visualized fissures, indeterminate.    LIVER: Innumerable bilobar hypoattenuating lesions.  BILE DUCTS: Normal caliber.  GALLBLADDER: Suggestion of layering sludge.  SPLEEN: Within normal limits.  PANCREAS: Within normal limits.  ADRENALS: Within normal limits.  KIDNEYS/URETERS: Within normal limits.    BLADDER: Underdistended.  REPRODUCTIVE ORGANS: Limited evaluation due to surrounding ascites and   noncontrast study.    BOWEL: No bowel obstruction. Appendix is normal. Extensive peritoneal   carcinomatosis with serosal implants involving parts of the bowel.   Ill-defined masslike thickening of the upper rectum and distal sigmoid,   question serosal implants or underlying primary malignancy. Left lateral   to and inseparable from the upper rectum, there is a 2.8 x 2.7 cm   hypoattenuating rounded structure, question tumor deposit/implant versus   extramural tumor.  PERITONEUM: Extensive peritoneal carcinomatosis with a reference tumor   depositin the left hemiabdomen measuring 4.2 x 2.1 cm (301:62). Small to   moderate volume abdominopelvic ascites with peritoneal thickening and   nodularity.  VESSELS: Within normal limits.  RETROPERITONEUM/LYMPH NODES: No lymphadenopathy.  ABDOMINAL WALL:Within normal limits.  BONES: Degenerative changes. Multilevel vertebral body hemangiomata.    IMPRESSION:  Limited noncontrast study.    CT evidence of metastatic disease characterized by multiple bilobar liver   lesions and peritoneal carcinomatosis/malignant ascites. Unknown primary.   Several areas of colonic wall thickening, question serosal implants   involving those organs versus primary neoplasm.    Fissural nodularity and subpleural and tubular nodules at the visualized   lung bases, nonspecific.    < end of copied text >  < from: CT Chest No Cont (23 @ 18:40) >  LUNGS/AIRWAYS/PLEURA: Patent central airways. Nodularity of the lung   fissures measure up to 6 mm. Scattered nodularity, some which is somewhat   tubular measures up to 1.5 cm. Coarse calcification in the right lower   lobe . No pleural effusion. No pneumothorax.    LYMPH NODES/MEDIASTINUM: No lymphadenopathy.    HEART/VASCULATURE: Heart is normal in size. There is no pericardial   effusion. Coronary artery calcifications.The blood pool in the heart is   hypodense relative to the myocardium which can be seen in anemia. There   is a common origin of the innominate artery and left common carotid, a   normal variant.    UPPER ABDOMEN: Numerous indeterminate hypodense lesions in the liver.   Redemonstrated peritoneal implants. Ascites. Right cardiophrenic lymph   nodes. Several nodules measuring up to 9 mm adjacent to the pericardium   in the right cardiophrenic region .    BONES/SOFT TISSUES: L1 vertebral body hemangioma. Degenerative changes.      IMPRESSION:  Visual metastasis. Scattered nodularity, some which is somewhat tubular   measures up to 1.5 cm are indeterminate, metastasis should be considered.    Right cardiophrenic lymph nodes. Several nodules measuring up to 9 mm   adjacent to the pericardium in the right cardiophrenic region that may   represent additional cardiophrenic lymph nodes versus pericardial nodules.    < end of copied text >  < from: MR Abdomen No Cont (23 @ 12:06) >  PROCEDURE:  MRI of the abdomen was performed.  Limited noncontrast T1 and T2 weighted images were obtained. Patient was   unable to complete examination dueto pain.    FINDINGS:  LOWER CHEST: This bilateral pleural effusions.    LIVER: Innumerable confluent moderately T2 hyperintense lesions in the   liver, consistent with metastases.  BILE DUCTS: Normal caliber.  GALLBLADDER: Contour abnormality along the lateral aspect of the   gallbladder fundus likely reflects extrinsic compression due to adjacent   liver metastasis.  SPLEEN: Within normal limits.  PANCREAS: Mildly T2 hyperintense lesion in the body of the pancreas   measures 2.4 x 2.0 cm. No evidence of pancreatic ductal dilatation or   distal atrophy.  ADRENALS: Within normal limits.  KIDNEYS/URETERS: Within normal limits.    VISUALIZED PORTIONS:  BOWEL: Within normal limits.  PERITONEUM: Moderate ascites and peritoneal carcinomatosis, as seen on   prior CT.  VESSELS: Within normal limits.  RETROPERITONEUM/LYMPH NODES: No lymphadenopathy.  ABDOMINAL WALL: Within normal limits.  BONES: Within normal limits.    IMPRESSION:  *  Limited exam.  *  Moderate ascites. Hepatic and peritoneal metastatic disease.  *  Mass in the body of the pancreas measuring 2.4 cm suggest pancreatic   adenocarcinoma.    < end of copied text >    < from: Flexible Sigmoidoscopy (23 @ 15:28) >  Findings:       A fungating partially obstructing large mass was found in the        recto-sigmoid colon. The mass was partially circumferential (involving        two-thirds of the lumen circumference). The pediatric colonoscope could        not be advanced beyond the mass without significant resistance, thus        further attempts at advancing were aborted. Oozing was present. Biopsies        were taken with a cold forceps for histology.       Non-bleeding hemorrhoids were found.                                                                                   Impression:          - Likely malignant partially obstructing tumor in the                        recto-sigmoid colon. Biopsied.                       - Non-bleeding hemorrhoids.  Recommendation:      - [facility] for [facility] care.                    - Clear liquid diet.                       - Recommend medical and surgical oncology consultations.                       - Follow up pathology results.    < end of copied text >

## 2023-09-13 NOTE — CONSULT NOTE ADULT - ASSESSMENT
58F PMH HTN p/t ED with 30lbs weight loss in 3 months and diarrhea, likely 2/2 metastatic cancer. Primary cancer currently unknown, biopsy pending. MRI shows pancreatic body mass of 2.4 cm. CA 19-9 4448, .    PLAN    58F PMH HTN p/t ED with 30lbs weight loss in 3 months and diarrhea, likely 2/2 metastatic cancer. Primary cancer currently unknown, biopsy pending. MRI shows pancreatic body mass of 2.4 cm. CA 19-9 4448, .    PLAN  - Final recommendations pending attending review of imaging    D team  [id number]

## 2023-09-13 NOTE — CONSULT NOTE ADULT - REASON FOR ADMISSION
Worsening diarrhea, weight loss

## 2023-09-13 NOTE — PROGRESS NOTE ADULT - PROBLEM SELECTOR PLAN 2
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   -Oncology input appreciated   -CT chest with scattered lung nodules concerning for metastasis   -IR consulted for potential biopsy, unable to obtain CT A/P w/ IV contrast d/t MESHA, ordered MRI A/P   -GI consulted, s/p EGD and sigmoidoscopy. mass biopsied. f/u path.   - MRI showing Pancreatic lesions, maria elena for pancreatic adenoca. f/u pathology.  - medical and surgical oncology consult.

## 2023-09-13 NOTE — PROGRESS NOTE ADULT - PROBLEM SELECTOR PLAN 11
DVT: heparin gtt full a/c  Diet: regular

## 2023-09-14 NOTE — PHARMACOTHERAPY INTERVENTION NOTE - COMMENTS
Medication list updated in Outpatient Medication Record (OMR). Fill information from [facility]. 
Discharge medications reviewed with the patient and her family. Current medication schedule was discussed in detail including: medication name, indication, dose, administration times, treatment duration, side effects, and special instructions. Educated patient on apixaban including the purpose and administration. Discussed adverse effects in detail and when to seek medical attention (blood in stool, vomit, etc.). Informed patient to notify all providers she is on this and before any planned procedures. Advised to avoid NSAIDs to limit risk of bleeding. Questions and concerns were answered and addressed. Patient demonstrated understanding. Patient provided with educational handout.    New medications: apixaban, allopurinol    Lacy Carter, JanD, BCPS  Clinical Pharmacy Specialist  [id number]

## 2023-09-14 NOTE — DISCHARGE NOTE PROVIDER - CARE PROVIDER_API CALL
Venkatesh Solorzano  Phone: (   )    -  Fax: (   )    -  Follow Up Time:    Venkatesh Solorzano  Phone: (   )    -  Fax: (   )    -  Follow Up Time:     Chicho Edward  [facility]  [address] [place] [id number]  Phone: [phone]  Fax: [phone]  Follow Up Time:

## 2023-09-14 NOTE — CHART NOTE - NSCHARTNOTEFT_GEN_A_CORE
Added - MMR, Foundation, KRAS, BRAF studies to GI pathology samples, via fax to [phone] per colorectal surgery reccs.     accession#: [id number] for GI pathology
IR Follow-up 9/14/2023    57yo Female w/ MHx of HTN a/w MESHA, severe uremia and acute DVT i/s/o newly found carcinomatosis with liver metastases. IR was consulted for tissue sampling for biopsy. MR A/P was obtained for further characterization. Discussed with primary team today - patient wishes to be discharged with outpatient follow-up. IR will sign off.     Outpatient appointment with IR can be made at followed:  IR office [phone]  email: [email or web address]    Graeme Be MD PGY-3  Interventional Radiology  IR Pager: [phone]  Available on Teams
Oncology Chart Note:    Meliza Singh 1965  57yo F with PMH of htn p/w 2 months of diarrhea, poor PO intake, lower extremity edema and weight loss of 30 lbs. She denied abdominal pain, vaginal bleeding. She has no history of colonoscopy or endoscopy.   Patient had diarrhea and FOBT positive with PMD 1 month ago.     VS:  Vital Signs Last 24 Hrs  T(C): 36.4 (06 Sep 2023 21:33), Max: 36.4 (06 Sep 2023 10:10)  T(F): 97.5 (06 Sep 2023 21:33), Max: 97.5 (06 Sep 2023 10:10)  HR: 89 (06 Sep 2023 21:33) (85 - 95)  BP: 114/66 (06 Sep 2023 21:33) (103/60 - 121/49)  BP(mean): --  RR: 19 (06 Sep 2023 21:33) (18 - 19)  SpO2: 98% (06 Sep 2023 21:33) (98% - 99%)    Parameters below as of 06 Sep 2023 21:33  Patient On (Oxygen Delivery Method): room air        WBC 21.55, Hgb 10, plt 171, 88% neutrophils, 6% lymphocytes, monocytosis 1.31. Rouleaux noted on CBC  INR 1.26, PT 14.1, PTT 27.2  , K 4.7, Cl 94, HCO3 17, BUN 91, SCr 4.66, Uric acid 22.2 (high)  Albumin 2.8 (LOW) , Tbili 0.6 WNL  Alk phos 547 (high), , ALT 43    GI PCR negative  CT AP non-contrast: Limited noncontrast study.    CT evidence of metastatic disease characterized by multiple bilobar liver   lesions and peritoneal carcinomatosis/malignant ascites. Unknown primary.   Several areas of colonic wall thickening, question serosal implants   involving those organs versus primary neoplasm.    Fissural nodularity and subpleural and tubular nodules at the visualized   lung bases, nonspecific.    Duplex US: acute DVT in R popliteal and posterior tibial veins.  ACute DVT extending from left femoral vein through popliteal and calf veins.       #Metastatic disease  Imaging suggests liver, peritoneal, colonic and rectal involvement, and possible lung involvement.  Biopsy will be required for diagnostic purposes, and this should be pursued inpatient. When patient is stable from DVT perspective, consult IR for biopsy of metastatic lesion peritoneal carcinomatosis or liver lesion.   - Patient will require CT CAP with IV contrast eventually if renal function improves.   - Check CEA    #Anemia and leukocytosis:  Leukocytosis WBC 21.55 may be reactive. Trend CBC  Hgb 10, MCV 84.1  - Check iron studies, ferritin, reticulocyte count, B12, folic acid, LDH and haptoglobin. Anemia suspected likely to be related to malignancy, less likely to be related to hemolytic process     #Acute DVTs:  -Continue heparin gtt given above and below knee DVTs and MESHA  -DVT suspected to be provoked by underlying malignancy     #MESHA and elevated uric acid:  - This is suspected new malignancy with uric acid of 22 and elevated phosphorus and MESHA. While TLS would be less likely with solid tumor compared to hematologic malignancy, until diagnosis is ascertained, would treat as TLS. Give rasburicase 6mg.  -Volume resuscitate.   -Check UA, kidney and bladder US  -Monitor I&Os. Trend BMP.   -Check urine protein:creatinine  -Avoid nephrotoxic agents.    Full consult note to follow in AM    Note not finalized until signed by attending.   Please do not hesitate to page with questions.     Sidra Elias MD PGY5   [phone]  Hematology-Oncology Fellow  WEEKENDS- Please call  to page on-call fellow
Oncology chart note     Lab reviewed: increased LDH and phosphorus, magnesium.   Uric acid level <0.2 on 9/8 but 14.5 on 9/7     Of note, pt need at least daily TLS lab including Phosphorus, mg, LDH, uric acid, and CMP (including ALS/AST).   please contact with Renal team for MESHA if necessary.   Continue with IVF for now.   If necessary Rasburicase 3mg IV if Uric acid >8. Please contact on call oncology fellow if having more questions.     Marin Villa PGY6   Hem & Onc fellow
Source: Patient [ x]    Family [ ]     other [x ]NP, Chart review    Current Diet : Diet, Regular:   No Concentrated Potassium  No Concentrated Phosphorus (09-14-23 @ 12:09) [Active]    Height (cm): 177.8 (07 Sep 2023 05:38)  Weight (kg): 89.8 (06 Sep 2023 15:38), no updated weight to assess  BMI (kg/m2): 28.4 (07 Sep 2023 05:38)    Nutrition note:  59yo Female w/ MHx of HTN a/w MESHA, severe uremia and acute DVT i/s/o newly found carcinomatosis with liver metastases, per chart.    Patient is seen for nutrition follow-up. Patient is on clear liquid diet during visit. Patient reports tolerating clear liquid diet during visit. No reports of any difficulty chewing or swallowing current diet consistency noted at this time. Patient denies any nausea, vomiting, diarrhea, constipation during visit. Reports last bowel movement 9/14/2023. As per chart review, patient s/p EGD and flex sigmoidoscopy on 9/11. Spoke with covering NP, diet is advanced back to regular consistency. Labs reviewed, current K, phos labs are WNL. As per RN flow sheet, patient has 1+ edema to left ankle, right ankle. No pressure injury noted at this time.     __________________ Pertinent Medications__________________   MEDICATIONS  (STANDING):  allopurinol 100 milliGRAM(s) Oral daily  apixaban 10 milliGRAM(s) Oral every 12 hours  influenza   Vaccine 0.5 milliLiter(s) IntraMuscular once  pantoprazole    Tablet 40 milliGRAM(s) Oral two times a day  sertraline 25 milliGRAM(s) Oral daily  traZODone 50 milliGRAM(s) Oral at bedtime    MEDICATIONS  (PRN):  acetaminophen     Tablet .. 650 milliGRAM(s) Oral every 6 hours PRN Temp greater or equal to 38C (100.4F), Mild Pain (1 - 3)  aluminum hydroxide/magnesium hydroxide/simethicone Suspension 30 milliLiter(s) Oral every 4 hours PRN Dyspepsia  melatonin 3 milliGRAM(s) Oral at bedtime PRN Insomnia  ondansetron Injectable 4 milliGRAM(s) IV Push every 8 hours PRN Nausea and/or Vomiting      __________________ Pertinent Labs__________________   09-14 Na137 mmol/L Glu 105 mg/dL<H> K+ 4.7 mmol/L Cr  1.77 mg/dL<H> BUN 47 mg/dL<H> 09-14 Phos 3.8 mg/dL 09-13 Alb 2.2 g/dL<L> 09-07 Chol 108 mg/dL LDL --    HDL 32 mg/dL<L> Trig 145 mg/dL      Estimated Needs:   [x ] no change since previous assessment    Previous Nutrition Diagnosis:   [x ] Moderate malnutrition   Nutrition Diagnosis is [ x] ongoing    New Nutrition Diagnosis: [x ] not applicable    Education: [x ] Not warranted at this time.     Interventions:   Recommend  [x ] D/c no conc. phos, no cons. K restrictions. Change Diet To: Regular.   [x] Nutrition department to provide Oragin 11oz 1x/day (220kcal, 16gm protein) for nutrient support.   [x] Monitor po diet tolerance and po intake.   [x] Encourage PO intake and honor food preferences as able.      Monitoring and Evaluation:   [x ] PO intake [x ] Tolerance to diet prescription [ x] weights [x ] follow up per protocol  [x ] other: bowel movement, skin integrity, labs.
TLS labs sent by RN however still not resulted. Called lab who states there was a problem with the machine and labs are being processed manually hence the delay in getting results back. Will monitor patient and check results to r/o TLS once they are back.

## 2023-09-14 NOTE — DISCHARGE NOTE PROVIDER - HOSPITAL COURSE
57yo Female w/ MHx of HTN a/w MESHA, severe uremia. This was attributed to diarrhea and dehydration. infectious workup for diarrhea was unrevealing and patient's diarrhea resolved during course of hospitalization. On admission, she was noted to and acute DVT in b/l LE and CT showing peritoneal carcinomatosis with liver lesions. Her Cr. slowly improved with hydration and her DVT was treated with AC. She underwent flexsigmoidoscopy for the colon mass and biopsy performed by GI team. She had MR abdomen, which reviewed lesion in her pancrease as well. Her tumor marker for CEA and Ca 19-9 were both significantly elevated. Oncolong recommended biopsy of pancreatic lesion by IR, but patient does not want to remain hospitalized for this and wish to follow up outpatient for further workup. She is medically stable for discharge home. will transition her to Eliquis for AC of her LE DVT. Oncology will reach out for follow up appointments.     #Metastatic cancer  #colon mass  #pancreatic mas  - suspecting metastatic cancer.   - biopsy performed on the colon mass  - follow up with oncologist. they will reach out for an appointment    #MESHA  - this is likely due to dehydration.   - it has greatly improved  - hold lisinopril. hold any nephrotoxics.   - follow up with PCP for monitoring.     #DVT  - b/l LE DVT, on hep gtt, transition to Eliquis.     #TLS  - labs were concerning for TLS during hospitalization,   - continue allopurinol.     #leukocyotosis  - persistent leukocytosis. no signs of acute infection.   - cultures negative.   - monitor outpatient.

## 2023-09-14 NOTE — PROGRESS NOTE ADULT - ASSESSMENT
58F PMH HTN p/t ED with 30lbs weight loss in 3 months and diarrhea, likely 2/2 metastatic cancer. Also has an MESHA. Primary cancer currently unknown, biopsy of rectal mass pending, no pancreatic biopsy yet. MRI shows pancreatic body mass of 2.4 cm. CA 19-9 4448, . Imaging is consistent with metastatic disease. Patient is not obstructed.     -No surgical intervention indicated at this time.  -Needs medical oncology management  -Will be followed by Dr. Zuniga and D-Team surgery    D-team pager: [phone] 58F PMH HTN p/t ED with 30lbs weight loss in 3 months and diarrhea, likely 2/2 metastatic cancer. Also has an MESHA. Primary cancer currently unknown, biopsy of rectal mass pending, no pancreatic biopsy yet. MRI shows pancreatic body mass of 2.4 cm. CA 19-9 4448, . Imaging is consistent with metastatic disease. Patient is not obstructed.     -No surgical intervention indicated at this time  -Needs medical oncology management with palliative chemotherapy  -Will be followed by Dr. Zuniga and D-Team surgery    D-team pager: [phone]

## 2023-09-14 NOTE — DISCHARGE NOTE PROVIDER - NSDCFUSCHEDAPPT_GEN_ALL_CORE_FT
Doug Marcos  [facility]  [address]  Scheduled Appointment: 11/07/2023

## 2023-09-14 NOTE — DISCHARGE NOTE NURSING/CASE MANAGEMENT/SOCIAL WORK - PATIENT PORTAL LINK FT
You can access the FollowMyHealth Patient Portal offered by [facility] by registering at the following website: [email or web address]. By joining [facility]’s FollowMyHealth portal, you will also be able to view your health information using other applications (apps) compatible with our system.

## 2023-09-14 NOTE — DISCHARGE NOTE PROVIDER - NSDCCPCAREPLAN_GEN_ALL_CORE_FT
PRINCIPAL DISCHARGE DIAGNOSIS  Diagnosis: Metastatic cancer  Assessment and Plan of Treatment: the imagine of your abdomen showed concerning signs for metastatic cancer. Oncology will reach out to schedule for follow up in clinic. please follow up with them for biopsy result and further treatment.      SECONDARY DISCHARGE DIAGNOSES  Diagnosis: Colonic mass  Assessment and Plan of Treatment: the mass was biopsied by gastroenterologist. sample is being tested by pathology department. you were evaluted by surgeons and no surgical options at this time. Please follow up with oncologist for pathology results and further treatment plans.    Diagnosis: Pancreatic mass  Assessment and Plan of Treatment: Oncology recommended biopsy of pancreas lesion. follow up with them in office to plan the biopsy.    Diagnosis: DVT, lower extremity  Assessment and Plan of Treatment: you are transition to Eliquis. please monitor for signs of bleeding. follow up with oncologist.    Diagnosis: MESHA (acute kidney injury)  Assessment and Plan of Treatment: Your renal function was severely elevated, but has improved during the hospsital course. There is still some mild elevation. please hold Lisinopril. avoid motrins and other NSAIDs if you have any pain. follow up with PCP to continue monitor the renal function.    Diagnosis: Leukocytosis  Assessment and Plan of Treatment: Your WBC has been persistently elevated during the course of this hospitalization. There is no suspicion for infection at this time. This could be due to the cancer. follow up with oncologist. monitor for signs of infection. such as fever, chill, coughing, SOB, chest pain, abdominal pain, diarrhea, uirnary urgency and frequency. speak to your outpatient provider if you have concerns.     PRINCIPAL DISCHARGE DIAGNOSIS  Diagnosis: Metastatic cancer  Assessment and Plan of Treatment: the imagine of your abdomen showed concerning signs for metastatic cancer. Oncology will reach out to schedule for follow up in clinic. please follow up with them for biopsy result and further treatment.  Please follow up for MRI ABD with IV contrast for interventional radiology biopsy of liver   Please follow up with the GI doctor for biopsy of pancreatic mass      SECONDARY DISCHARGE DIAGNOSES  Diagnosis: DVT, lower extremity  Assessment and Plan of Treatment: you are transition to Eliis. please monitor for signs of bleeding. follow up with oncologist.    Diagnosis: Colonic mass  Assessment and Plan of Treatment: the mass was biopsied by gastroenterologist. sample is being tested by pathology department. you were evaluted by surgeons and no surgical options at this time. Please follow up with oncologist for pathology results and further treatment plans.    Diagnosis: Pancreatic mass  Assessment and Plan of Treatment: Oncology recommended biopsy of pancreas lesion. follow up with them in office to plan the biopsy.    Diagnosis: MESHA (acute kidney injury)  Assessment and Plan of Treatment: Your renal function was severely elevated, but has improved during the hospsital course. There is still some mild elevation. please hold Lisinopril. avoid motrins and other NSAIDs if you have any pain. follow up with PCP to continue monitor the renal function.    Diagnosis: Leukocytosis  Assessment and Plan of Treatment: Your WBC has been persistently elevated during the course of this hospitalization. There is no suspicion for infection at this time. This could be due to the cancer. follow up with oncologist. monitor for signs of infection. such as fever, chill, coughing, SOB, chest pain, abdominal pain, diarrhea, uirnary urgency and frequency. speak to your outpatient provider if you have concerns.

## 2023-09-14 NOTE — DISCHARGE NOTE NURSING/CASE MANAGEMENT/SOCIAL WORK - NSDCPEFALRISK_GEN_ALL_CORE
For information on Fall & Injury Prevention, visit: [email or web address] OR  [email or web address] OR  https://www.cdc.gov/steadi/patient.html

## 2023-09-14 NOTE — DISCHARGE NOTE PROVIDER - NSDCMRMEDTOKEN_GEN_ALL_CORE_FT
allopurinol 100 mg oral tablet: 1 tab(s) orally once a day  Eliquis 5 mg oral tablet: 1 tab(s) orally 2 times a day  pantoprazole 40 mg oral delayed release tablet: 1 tab(s) orally once a day  sertraline 25 mg oral tablet: 1 tab(s) orally once a day  traZODone 50 mg oral tablet: 1 tab(s) orally once a day (at bedtime)

## 2023-09-14 NOTE — PROGRESS NOTE ADULT - SUBJECTIVE AND OBJECTIVE BOX
House GI Progress Note    Chief Complaint:  Patient is a 58y old  Female who presents with a chief complaint of Worsening diarrhea, weight loss (07 Sep 2023 18:07)    Interval Events / Subjective:   No acute events overnight. Patient tolerating small portions of food without any ab pain, n/v. Patient noted 1 nonbloody BM yesterday evening. Denied any fever, chills.     REVIEW OF SYSTEMS:   General: No fever, chills, night sweats, weight loss or fatigue  HEENT: No vision changes, vertigo, sore throat, nasal congestion  CV: No chest pain, palpitations, orthopnea or PND  Respiratory: No dyspnea, cough, wheezing  GI: See HPI  : No hematuria, dysuria, incontinence or nocturia  MSK: No joint pain, swelling or stiffness; no muscle pain or weakness  Neuro: No headache, focal weakness, paresthesias  Psych: No depressed mood, anxiety or insomnia  Endocrine: No polyuria, polydipsia, cold/heat intolerance  Hematologic: No petechiae, ecchymoses or lymphadenopathy  Skin: No rashes or lesions    MEDICATIONS  (STANDING):  allopurinol 100 milliGRAM(s) Oral daily  heparin  Infusion. 1100 Unit(s)/Hr (11 mL/Hr) IV Continuous <Continuous>  influenza   Vaccine 0.5 milliLiter(s) IntraMuscular once  pantoprazole    Tablet 40 milliGRAM(s) Oral before breakfast  sertraline 25 milliGRAM(s) Oral daily  sevelamer carbonate 800 milliGRAM(s) Oral three times a day  sodium chloride 0.9%. 1000 milliLiter(s) (125 mL/Hr) IV Continuous <Continuous>  traZODone 50 milliGRAM(s) Oral at bedtime    MEDICATIONS  (PRN):  acetaminophen     Tablet .. 650 milliGRAM(s) Oral every 6 hours PRN Temp greater or equal to 38C (100.4F), Mild Pain (1 - 3)  aluminum hydroxide/magnesium hydroxide/simethicone Suspension 30 milliLiter(s) Oral every 4 hours PRN Dyspepsia  heparin   Injectable 7000 Unit(s) IV Push every 6 hours PRN For aPTT less than 40  heparin   Injectable 3500 Unit(s) IV Push every 6 hours PRN For aPTT between 40 - 57  melatonin 3 milliGRAM(s) Oral at bedtime PRN Insomnia  ondansetron Injectable 4 milliGRAM(s) IV Push every 8 hours PRN Nausea and/or Vomiting    ALLERGIES:   Allergies  No Known Allergies    VITAL SIGNS:   Vital Signs Last 24 Hrs  T(C): 36.7 (08 Sep 2023 05:38), Max: 36.8 (07 Sep 2023 13:30)  T(F): 98 (08 Sep 2023 05:38), Max: 98.3 (07 Sep 2023 13:30)  HR: 82 (08 Sep 2023 05:38) (80 - 98)  BP: 104/75 (08 Sep 2023 05:38) (91/59 - 118/75)  BP(mean): --  RR: 18 (08 Sep 2023 05:38) (17 - 18)  SpO2: 99% (08 Sep 2023 05:38) (99% - 100%)    Parameters below as of 08 Sep 2023 05:38  Patient On (Oxygen Delivery Method): room air    I&O's Summary    07 Sep 2023 07:01  -  08 Sep 2023 07:00  --------------------------------------------------------  IN: 1262 mL / OUT: 0 mL / NET: 1262 mL    PHYSICAL EXAM:   GENERAL:  Appears stated age, well-groomed, well-nourished, no distress  HEENT:  NC/AT,  conjunctivae clear, sclera -anicteric  CHEST:  Full & symmetric excursion, no increased effort, breath sounds clear  HEART:  Regular rhythm, S1, S2, no murmur/rub/S3/S4,  no edema  ABDOMEN:  Soft, non-tender, non-distended, normoactive bowel sounds,  no masses, no hepato-splenomegaly,   EXTREMITIES: No cyanosis,clubbing or edema  SKIN:  No rash, erythema, ecchymoses  NEURO:  Alert, oriented    LABS:  CBC Full  -  ( 08 Sep 2023 06:47 )  WBC Count : 14.89 K/uL  RBC Count : 3.08 M/uL  Hemoglobin : 8.5 g/dL  Hematocrit : 26.7 %  Platelet Count - Automated : 234 K/uL  Mean Cell Volume : 86.7 fL  Mean Cell Hemoglobin : 27.6 pg  Mean Cell Hemoglobin Concentration : 31.8 gm/dL  Auto Neutrophil # : x  Auto Lymphocyte # : x  Auto Monocyte # : x  Auto Eosinophil # : x  Auto Basophil # : x  Auto Neutrophil % : x  Auto Lymphocyte % : x  Auto Monocyte % : x  Auto Eosinophil % : x  Auto Basophil % : x    09-08    130<L>  |  95<L>  |  99<H>  ----------------------------<  114<H>  4.4   |  18<L>  |  3.25<H>    Ca    8.6      08 Sep 2023 06:47  Phos  5.3     09-08  Mg     3.30     09-08    TPro  6.4  /  Alb  2.7<L>  /  TBili  0.5  /  DBili  x   /  AST  91<H>  /  ALT  39<H>  /  AlkPhos  538<H>  09-08    LIVER FUNCTIONS - ( 08 Sep 2023 01:25 )  Alb: 2.7 g/dL / Pro: 6.4 g/dL / ALK PHOS: 538 U/L / ALT: 39 U/L / AST: 91 U/L / GGT: x           PT/INR - ( 07 Sep 2023 06:28 )   PT: 12.8 sec;   INR: 1.15 ratio         PTT - ( 08 Sep 2023 09:40 )  PTT:60.1 sec  Urinalysis Basic - ( 08 Sep 2023 06:47 )    Color: x / Appearance: x / SG: x / pH: x  Gluc: 114 mg/dL / Ketone: x  / Bili: x / Urobili: x   Blood: x / Protein: x / Nitrite: x   Leuk Esterase: x / RBC: x / WBC x   Sq Epi: x / Non Sq Epi: x / Bacteria: x        Culture - Blood (collected 07 Sep 2023 06:39)  Source: .Blood Blood-Peripheral  Preliminary Report (08 Sep 2023 09:01):    No growth at 24 hours    Culture - Blood (collected 07 Sep 2023 06:28)  Source: .Blood Blood-Peripheral  Preliminary Report (08 Sep 2023 09:01):    No growth at 24 hours    Culture - Stool (collected 06 Sep 2023 19:14)  Source: .Stool Feces  Preliminary Report (07 Sep 2023 16:41):    No enteric pathogens to date: Final culture pending    
PROGRESS NOTE:     Patient is a 58y old  Female who presents with a chief complaint of Worsening diarrhea, weight loss (10 Sep 2023 14:07)      SUBJECTIVE / OVERNIGHT EVENTS: No acute events.     ADDITIONAL REVIEW OF SYSTEMS:    MEDICATIONS  (STANDING):  allopurinol 100 milliGRAM(s) Oral daily  heparin  Infusion. 1100 Unit(s)/Hr (11 mL/Hr) IV Continuous <Continuous>  influenza   Vaccine 0.5 milliLiter(s) IntraMuscular once  pantoprazole    Tablet 40 milliGRAM(s) Oral two times a day  sertraline 25 milliGRAM(s) Oral daily  sodium chloride 0.9%. 1000 milliLiter(s) (100 mL/Hr) IV Continuous <Continuous>  traZODone 50 milliGRAM(s) Oral at bedtime    MEDICATIONS  (PRN):  acetaminophen     Tablet .. 650 milliGRAM(s) Oral every 6 hours PRN Temp greater or equal to 38C (100.4F), Mild Pain (1 - 3)  aluminum hydroxide/magnesium hydroxide/simethicone Suspension 30 milliLiter(s) Oral every 4 hours PRN Dyspepsia  heparin   Injectable 7000 Unit(s) IV Push every 6 hours PRN For aPTT less than 40  heparin   Injectable 3500 Unit(s) IV Push every 6 hours PRN For aPTT between 40 - 57  melatonin 3 milliGRAM(s) Oral at bedtime PRN Insomnia  ondansetron Injectable 4 milliGRAM(s) IV Push every 8 hours PRN Nausea and/or Vomiting      CAPILLARY BLOOD GLUCOSE        I&O's Summary      PHYSICAL EXAM:  Vital Signs Last 24 Hrs  T(C): 36.7 (10 Sep 2023 06:26), Max: 36.7 (10 Sep 2023 06:26)  T(F): 98.1 (10 Sep 2023 06:26), Max: 98.1 (10 Sep 2023 06:26)  HR: 91 (10 Sep 2023 06:26) (88 - 91)  BP: 131/98 (10 Sep 2023 06:26) (111/78 - 131/98)  BP(mean): --  RR: 18 (10 Sep 2023 06:26) (18 - 18)  SpO2: 100% (10 Sep 2023 06:26) (100% - 100%)    Parameters below as of 10 Sep 2023 06:26  Patient On (Oxygen Delivery Method): room air      General: Well-groomed, NAD, laying in bed  HEENT: PERRLA, EOMI, non-icteric  Neck:  symmetric,  JVD absent  Respiratory: Clear to ascultation bilaterally, no crackles/rales, no resp distress; no accessory muscle use  Cardiovascular: RRR, no murmurs/rubs/gallops  Abdomen: slight firmness in epigastrium, NT, ND  Extremities: non-pitting edema in LLE to the knee  Skin: No rashes or lesions noted  Neurological: Strength grossly intact  Psychiatry: AOx3, appropriate insight/judgement, appropriate affect    LABS:                        7.8    14.01 )-----------( 325      ( 10 Sep 2023 06:48 )             25.3     09-10    136  |  106  |  71<H>  ----------------------------<  112<H>  4.5   |  19<L>  |  1.78<H>    Ca    8.4      10 Sep 2023 06:48  Phos  3.5     09-10  Mg     2.90     09-10    TPro  5.7<L>  /  Alb  2.4<L>  /  TBili  0.5  /  DBili  x   /  AST  97<H>  /  ALT  42<H>  /  AlkPhos  523<H>  09-10    PTT - ( 10 Sep 2023 06:48 )  PTT:66.7 sec      Urinalysis Basic - ( 10 Sep 2023 06:48 )    Color: x / Appearance: x / SG: x / pH: x  Gluc: 112 mg/dL / Ketone: x  / Bili: x / Urobili: x   Blood: x / Protein: x / Nitrite: x   Leuk Esterase: x / RBC: x / WBC x   Sq Epi: x / Non Sq Epi: x / Bacteria: x          RADIOLOGY & ADDITIONAL TESTS:  Results Reviewed:   Imaging Personally Reviewed:  Electrocardiogram Personally Reviewed:    COORDINATION OF CARE:  Care Discussed with Consultants/Other Providers [Y/N]:  Prior or Outpatient Records Reviewed [Y/N]:  
Surgery Daily Progress Note    Subjective:   Patient seen at bedside this AM. Denies acute onset abdominal pain, N/V/D, fevers, chills, SOB, CP, lightheadedness    24h Events:   - Overnight, no acute events    Objective:  Vital Signs  T(C): 36.4 (09-14 @ 05:50), Max: 36.6 (09-13 @ 11:30)  HR: 86 (09-14 @ 05:50) (86 - 91)  BP: 108/69 (09-14 @ 05:50) (99/63 - 108/70)  RR: 17 (09-14 @ 05:50) (17 - 17)  SpO2: 100% (09-14 @ 05:50) (99% - 100%)      Physical Exam:  GEN: resting in bed comfortably in NAD  RESP: no increased WOB  ABD: soft, non-distended, non-tender without rebound tenderness or guarding  EXTR: warm, well-perfused without gross deformities; spontaneous movement in b/l U/L extrem  NEURO: AAOx4    Labs:                        8.3    18.81 )-----------( 352      ( 14 Sep 2023 05:39 )             27.8   09-14    137  |  109<H>  |  47<H>  ----------------------------<  105<H>  4.7   |  17<L>  |  1.77<H>    Ca    8.6      14 Sep 2023 05:39  Phos  3.8     09-14  Mg     2.60     09-14    TPro  5.8<L>  /  Alb  2.2<L>  /  TBili  0.6  /  DBili  x   /  AST  133<H>  /  ALT  61<H>  /  AlkPhos  643<H>  09-13    CAPILLARY BLOOD GLUCOSE          Medications:   MEDICATIONS  (STANDING):  allopurinol 100 milliGRAM(s) Oral daily  heparin  Infusion. 1100 Unit(s)/Hr (11 mL/Hr) IV Continuous <Continuous>  influenza   Vaccine 0.5 milliLiter(s) IntraMuscular once  pantoprazole    Tablet 40 milliGRAM(s) Oral two times a day  sertraline 25 milliGRAM(s) Oral daily  traZODone 50 milliGRAM(s) Oral at bedtime    MEDICATIONS  (PRN):  acetaminophen     Tablet .. 650 milliGRAM(s) Oral every 6 hours PRN Temp greater or equal to 38C (100.4F), Mild Pain (1 - 3)  aluminum hydroxide/magnesium hydroxide/simethicone Suspension 30 milliLiter(s) Oral every 4 hours PRN Dyspepsia  heparin   Injectable 7000 Unit(s) IV Push every 6 hours PRN For aPTT less than 40  heparin   Injectable 3500 Unit(s) IV Push every 6 hours PRN For aPTT between 40 - 57  melatonin 3 milliGRAM(s) Oral at bedtime PRN Insomnia  ondansetron Injectable 4 milliGRAM(s) IV Push every 8 hours PRN Nausea and/or Vomiting      Imaging:  IMPRESSION:  Visual metastasis. Scattered nodularity, some which is somewhat tubular   measures up to 1.5 cm are indeterminate, metastasis should be considered.    Right cardiophrenic lymph nodes. Several nodules measuring up to 9 mm   adjacent to the pericardium in the right cardiophrenic region that may   represent additional cardiophrenic lymph nodes versus pericardial nodules.    < end of copied text >  < from: MR Abdomen No Cont (09.12.23 @ 12:06) >  PROCEDURE:  MRI of the abdomen was performed.  Limited noncontrast T1 and T2 weighted images were obtained. Patient was   unable to complete examination dueto pain.    FINDINGS:  LOWER CHEST: This bilateral pleural effusions.    LIVER: Innumerable confluent moderately T2 hyperintense lesions in the   liver, consistent with metastases.  BILE DUCTS: Normal caliber.  GALLBLADDER: Contour abnormality along the lateral aspect of the   gallbladder fundus likely reflects extrinsic compression due to adjacent   liver metastasis.  SPLEEN: Within normal limits.  PANCREAS: Mildly T2 hyperintense lesion in the body of the pancreas   measures 2.4 x 2.0 cm. No evidence of pancreatic ductal dilatation or   distal atrophy.  ADRENALS: Within normal limits.  KIDNEYS/URETERS: Within normal limits.    VISUALIZED PORTIONS:  BOWEL: Within normal limits.  PERITONEUM: Moderate ascites and peritoneal carcinomatosis, as seen on   prior CT.  VESSELS: Within normal limits.  RETROPERITONEUM/LYMPH NODES: No lymphadenopathy.  ABDOMINAL WALL: Within normal limits.  BONES: Within normal limits.    IMPRESSION:  *  Limited exam.  *  Moderate ascites. Hepatic and peritoneal metastatic disease.  *  Mass in the body of the pancreas measuring 2.4 cm suggest pancreatic   adenocarcinoma.    < end of copied text >    < from: Flexible Sigmoidoscopy (09.11.23 @ 15:28) >  Findings:       A fungating partially obstructing large mass was found in the        recto-sigmoid colon. The mass was partially circumferential (involving        two-thirds of the lumen circumference). The pediatric colonoscope could        not be advanced beyond the mass without significant resistance, thus        further attempts at advancing were aborted. Oozing was present. Biopsies        were taken with a cold forceps for histology.       Non-bleeding hemorrhoids were found.                                                                                   Impression:          - Likely malignant partially obstructing tumor in the                        recto-sigmoid colon. Biopsied.                       - Non-bleeding hemorrhoids.  Recommendation:      - Return patient to [facility] for [facility] care.                    - Clear liquid diet.                       - Recommend medical and surgical oncology consultations.                       - Follow up pathology results.    < end of copied text >    
Interval Events:   No acute events overnight following endoscopy.  Patient without acute symptoms at this time although she had trouble sleeping.    ROS:   12 point review of systems performed and negative except otherwise noted in HPI.    Hospital Medications:  acetaminophen     Tablet .. 650 milliGRAM(s) Oral every 6 hours PRN  allopurinol 100 milliGRAM(s) Oral daily  aluminum hydroxide/magnesium hydroxide/simethicone Suspension 30 milliLiter(s) Oral every 4 hours PRN  heparin   Injectable 7000 Unit(s) IV Push every 6 hours PRN  heparin   Injectable 3500 Unit(s) IV Push every 6 hours PRN  heparin  Infusion. 1100 Unit(s)/Hr IV Continuous <Continuous>  influenza   Vaccine 0.5 milliLiter(s) IntraMuscular once  melatonin 3 milliGRAM(s) Oral at bedtime PRN  ondansetron Injectable 4 milliGRAM(s) IV Push every 8 hours PRN  pantoprazole    Tablet 40 milliGRAM(s) Oral two times a day  sertraline 25 milliGRAM(s) Oral daily  sodium chloride 0.9%. 1000 milliLiter(s) IV Continuous <Continuous>  traZODone 50 milliGRAM(s) Oral at bedtime      PHYSICAL EXAM:   Vital Signs:  Vital Signs Last 24 Hrs  T(C): 36.4 (12 Sep 2023 05:00), Max: 36.9 (11 Sep 2023 21:40)  T(F): 97.5 (12 Sep 2023 05:00), Max: 98.4 (11 Sep 2023 21:40)  HR: 96 (12 Sep 2023 05:00) (96 - 109)  BP: 114/71 (12 Sep 2023 05:00) (106/78 - 138/95)  BP(mean): --  RR: 18 (12 Sep 2023 05:00) (12 - 18)  SpO2: 99% (12 Sep 2023 05:00) (95% - 99%)    Parameters below as of 12 Sep 2023 05:00  Patient On (Oxygen Delivery Method): room air      Daily     Daily     GENERAL: no acute distress  NEURO: alert  HEENT: NCAT, no conjunctival pallor appreciated  CHEST: no respiratory distress, no accessory muscle use  CARDIAC: regular rate, +S1/S2  ABDOMEN: soft, nontender, no rebound or guarding  EXTREMITIES: warm, well perfused  SKIN: no lesions noted    LABS: reviewed                        7.8    27.50 )-----------( 313      ( 12 Sep 2023 06:50 )             25.9     09-12    140  |  111<H>  |  51<H>  ----------------------------<  117<H>  4.9   |  17<L>  |  1.60<H>    Ca    8.2<L>      12 Sep 2023 06:50  Phos  3.8     09-12  Mg     2.60     09-12    TPro  6.3  /  Alb  2.5<L>  /  TBili  0.6  /  DBili  x   /  AST  126<H>  /  ALT  57<H>  /  AlkPhos  631<H>  09-12    LIVER FUNCTIONS - ( 12 Sep 2023 06:50 )  Alb: 2.5 g/dL / Pro: 6.3 g/dL / ALK PHOS: 631 U/L / ALT: 57 U/L / AST: 126 U/L / GGT: x             Interval Diagnostic Studies: see sunrise for full report  
Interval Events:   No acute events overnight. No GIB, BM overnight. No cardiac or pulmonary concerns.       Hospital Medications:  acetaminophen     Tablet .. 650 milliGRAM(s) Oral every 6 hours PRN  allopurinol 100 milliGRAM(s) Oral daily  aluminum hydroxide/magnesium hydroxide/simethicone Suspension 30 milliLiter(s) Oral every 4 hours PRN  heparin   Injectable 7000 Unit(s) IV Push every 6 hours PRN  heparin   Injectable 3500 Unit(s) IV Push every 6 hours PRN  heparin  Infusion. 1100 Unit(s)/Hr IV Continuous <Continuous>  influenza   Vaccine 0.5 milliLiter(s) IntraMuscular once  melatonin 3 milliGRAM(s) Oral at bedtime PRN  ondansetron Injectable 4 milliGRAM(s) IV Push every 8 hours PRN  pantoprazole    Tablet 40 milliGRAM(s) Oral two times a day  sertraline 25 milliGRAM(s) Oral daily  sodium chloride 0.9%. 1000 milliLiter(s) IV Continuous <Continuous>  traZODone 50 milliGRAM(s) Oral at bedtime      PHYSICAL EXAM:   Vital Signs:  Vital Signs Last 24 Hrs  T(C): 36.7 (10 Sep 2023 06:26), Max: 36.7 (10 Sep 2023 06:26)  T(F): 98.1 (10 Sep 2023 06:26), Max: 98.1 (10 Sep 2023 06:26)  HR: 91 (10 Sep 2023 06:26) (88 - 91)  BP: 131/98 (10 Sep 2023 06:26) (111/78 - 131/98)  BP(mean): --  RR: 18 (10 Sep 2023 06:26) (18 - 18)  SpO2: 100% (10 Sep 2023 06:26) (100% - 100%)    Parameters below as of 10 Sep 2023 06:26  Patient On (Oxygen Delivery Method): room air      Daily     Daily     GENERAL: no acute distress  NEURO: alert  HEENT: NCAT, no conjunctival pallor appreciated  CHEST: no respiratory distress, no accessory muscle use  CARDIAC: regular rate, +S1/S2  ABDOMEN: soft, nontender, no rebound or guarding  EXTREMITIES: warm, well perfused  SKIN: no active lesions noted    LABS: reviewed                        7.8    14.01 )-----------( 325      ( 10 Sep 2023 06:48 )             25.3     09-10    136  |  106  |  71<H>  ----------------------------<  112<H>  4.5   |  19<L>  |  1.78<H>    Ca    8.4      10 Sep 2023 06:48  Phos  3.5     09-10  Mg     2.90     09-10    TPro  5.7<L>  /  Alb  2.4<L>  /  TBili  0.5  /  DBili  x   /  AST  97<H>  /  ALT  42<H>  /  AlkPhos  523<H>  09-10    LIVER FUNCTIONS - ( 10 Sep 2023 06:48 )  Alb: 2.4 g/dL / Pro: 5.7 g/dL / ALK PHOS: 523 U/L / ALT: 42 U/L / AST: 97 U/L / GGT: x             Interval Diagnostic Studies: see sunrise for full report  
SUBJECTIVE:  Patient seen at bedside this AM. Denies acute onset abdominal pain, N/V/D, fevers, chills, SOB, CP, lightheadedness. Passing gas. NAEO.     MEDICATIONS  (STANDING):  allopurinol 100 milliGRAM(s) Oral daily  heparin  Infusion. 1100 Unit(s)/Hr (11 mL/Hr) IV Continuous <Continuous>  influenza   Vaccine 0.5 milliLiter(s) IntraMuscular once  pantoprazole    Tablet 40 milliGRAM(s) Oral two times a day  sertraline 25 milliGRAM(s) Oral daily  traZODone 50 milliGRAM(s) Oral at bedtime    MEDICATIONS  (PRN):  acetaminophen     Tablet .. 650 milliGRAM(s) Oral every 6 hours PRN Temp greater or equal to 38C (100.4F), Mild Pain (1 - 3)  aluminum hydroxide/magnesium hydroxide/simethicone Suspension 30 milliLiter(s) Oral every 4 hours PRN Dyspepsia  heparin   Injectable 7000 Unit(s) IV Push every 6 hours PRN For aPTT less than 40  heparin   Injectable 3500 Unit(s) IV Push every 6 hours PRN For aPTT between 40 - 57  melatonin 3 milliGRAM(s) Oral at bedtime PRN Insomnia  ondansetron Injectable 4 milliGRAM(s) IV Push every 8 hours PRN Nausea and/or Vomiting      Vital Signs Last 24 Hrs  T(C): 36.4 (14 Sep 2023 05:50), Max: 36.6 (13 Sep 2023 11:30)  T(F): 97.5 (14 Sep 2023 05:50), Max: 97.9 (13 Sep 2023 11:30)  HR: 86 (14 Sep 2023 05:50) (86 - 91)  BP: 108/69 (14 Sep 2023 05:50) (99/63 - 108/70)  BP(mean): --  RR: 17 (14 Sep 2023 05:50) (17 - 17)  SpO2: 100% (14 Sep 2023 05:50) (99% - 100%)    Parameters below as of 14 Sep 2023 05:50  Patient On (Oxygen Delivery Method): room air        PHYSICAL EXAM:      GEN: resting in bed comfortably in NAD  RESP: no increased WOB  ABD: soft, non-distended, non-tender without rebound tenderness or guarding  EXTR: warm, well-perfused without gross deformities; spontaneous movement in b/l U/L extrem  NEURO: AAOx4      I&O's Detail    13 Sep 2023 07:01  -  14 Sep 2023 07:00  --------------------------------------------------------  IN:    Heparin Infusion: 44 mL    Oral Fluid: 820 mL  Total IN: 864 mL    OUT:  Total OUT: 0 mL    Total NET: 864 mL          LABS:                        8.3    18.81 )-----------( 352      ( 14 Sep 2023 05:39 )             27.8     09-14    137  |  109<H>  |  47<H>  ----------------------------<  105<H>  4.7   |  17<L>  |  1.77<H>    Ca    8.6      14 Sep 2023 05:39  Phos  3.8     09-14  Mg     2.60     09-14    TPro  5.8<L>  /  Alb  2.2<L>  /  TBili  0.6  /  DBili  x   /  AST  133<H>  /  ALT  61<H>  /  AlkPhos  643<H>  09-13    PTT - ( 14 Sep 2023 05:39 )  PTT:69.2 sec  Urinalysis Basic - ( 14 Sep 2023 05:39 )    Color: x / Appearance: x / SG: x / pH: x  Gluc: 105 mg/dL / Ketone: x  / Bili: x / Urobili: x   Blood: x / Protein: x / Nitrite: x   Leuk Esterase: x / RBC: x / WBC x   Sq Epi: x / Non Sq Epi: x / Bacteria: x        RADIOLOGY & ADDITIONAL STUDIES: MRI
PROGRESS NOTE:     Patient is a 58y old  Female who presents with a chief complaint of Worsening diarrhea, weight loss (07 Sep 2023 12:33)      SUBJECTIVE / OVERNIGHT EVENTS: Diarrhea improving.     ADDITIONAL REVIEW OF SYSTEMS:    MEDICATIONS  (STANDING):  heparin  Infusion. 1100 Unit(s)/Hr (11 mL/Hr) IV Continuous <Continuous>  influenza   Vaccine 0.5 milliLiter(s) IntraMuscular once  pantoprazole    Tablet 40 milliGRAM(s) Oral before breakfast  rasburicase IVPB 6 milliGRAM(s) IV Intermittent once  sertraline 25 milliGRAM(s) Oral daily  sevelamer carbonate 800 milliGRAM(s) Oral three times a day  sodium chloride 0.9%. 1000 milliLiter(s) (125 mL/Hr) IV Continuous <Continuous>  traZODone 50 milliGRAM(s) Oral at bedtime    MEDICATIONS  (PRN):  acetaminophen     Tablet .. 650 milliGRAM(s) Oral every 6 hours PRN Temp greater or equal to 38C (100.4F), Mild Pain (1 - 3)  aluminum hydroxide/magnesium hydroxide/simethicone Suspension 30 milliLiter(s) Oral every 4 hours PRN Dyspepsia  heparin   Injectable 7000 Unit(s) IV Push every 6 hours PRN For aPTT less than 40  heparin   Injectable 3500 Unit(s) IV Push every 6 hours PRN For aPTT between 40 - 57  melatonin 3 milliGRAM(s) Oral at bedtime PRN Insomnia  ondansetron Injectable 4 milliGRAM(s) IV Push every 8 hours PRN Nausea and/or Vomiting      CAPILLARY BLOOD GLUCOSE        I&O's Summary      PHYSICAL EXAM:  Vital Signs Last 24 Hrs  T(C): 36.7 (07 Sep 2023 09:30), Max: 36.7 (07 Sep 2023 05:38)  T(F): 98 (07 Sep 2023 09:30), Max: 98.1 (07 Sep 2023 05:38)  HR: 90 (07 Sep 2023 09:30) (85 - 95)  BP: 109/69 (07 Sep 2023 09:30) (99/66 - 121/49)  BP(mean): --  RR: 17 (07 Sep 2023 09:30) (17 - 19)  SpO2: 100% (07 Sep 2023 09:30) (98% - 100%)    Parameters below as of 07 Sep 2023 09:30  Patient On (Oxygen Delivery Method): room air    General: Well-groomed, NAD, laying in bed, on RA  HEENT: PERRLA, EOMI, non-icteric  Neck:  symmetric,  JVD absent  Respiratory: Clear to ascultation bilaterally, no crackles/rales, no resp distress; no accessory muscle use  Cardiovascular:  Tachycardic, RRR, no murmurs/rubs/gallops  Abdomen: slight firmness in epigastrium, NT, ND  Extremities: non-pitting edema in LLE to the knee  Skin: No rashes or lesions noted  Neurological: Sensation grossly intact  Psychiatry: AOx3, appropriate insight/judgement, appropriate affect, recent/remote memory intact    LABS:                        8.6    17.21 )-----------( 180      ( 07 Sep 2023 06:28 )             27.1     09-07    129<L>  |  92<L>  |  96<H>  ----------------------------<  113<H>  4.4   |  17<L>  |  4.37<H>    Ca    8.4      07 Sep 2023 06:28  Phos  6.3     09-07  Mg     2.90     09-07    TPro  6.3  /  Alb  2.6<L>  /  TBili  0.5  /  DBili  x   /  AST  99<H>  /  ALT  41<H>  /  AlkPhos  550<H>  09-07    PT/INR - ( 07 Sep 2023 06:28 )   PT: 12.8 sec;   INR: 1.15 ratio         PTT - ( 07 Sep 2023 06:28 )  PTT:134.9 sec      Urinalysis Basic - ( 07 Sep 2023 06:28 )    Color: x / Appearance: x / SG: x / pH: x  Gluc: 113 mg/dL / Ketone: x  / Bili: x / Urobili: x   Blood: x / Protein: x / Nitrite: x   Leuk Esterase: x / RBC: x / WBC x   Sq Epi: x / Non Sq Epi: x / Bacteria: x          RADIOLOGY & ADDITIONAL TESTS:  Results Reviewed:   Imaging Personally Reviewed:  Electrocardiogram Personally Reviewed:    COORDINATION OF CARE:  Care Discussed with Consultants/Other Providers [Y/N]:  Prior or Outpatient Records Reviewed [Y/N]:  
[facility] Division of Hospital Medicine  Scot Rodriguez MD  In House Pager [phone]    Patient is a 58y old  Female who presents with a chief complaint of Worsening diarrhea, weight loss (13 Sep 2023 13:14)    SUBJECTIVE / OVERNIGHT EVENTS: no acute events. no complaints today. MRI result discussed with patient. will have medical and surgical oncology follow up. patient eager to go home.     ROS: Denied Fever, Chill, CP, SOB, Abd pain, N/V/D, LE swelling or pain.     MEDICATIONS  (STANDING):  allopurinol 100 milliGRAM(s) Oral daily  heparin  Infusion. 1100 Unit(s)/Hr (11 mL/Hr) IV Continuous <Continuous>  influenza   Vaccine 0.5 milliLiter(s) IntraMuscular once  pantoprazole    Tablet 40 milliGRAM(s) Oral two times a day  sertraline 25 milliGRAM(s) Oral daily  sodium chloride 0.9%. 1000 milliLiter(s) (100 mL/Hr) IV Continuous <Continuous>  traZODone 50 milliGRAM(s) Oral at bedtime    MEDICATIONS  (PRN):  acetaminophen     Tablet .. 650 milliGRAM(s) Oral every 6 hours PRN Temp greater or equal to 38C (100.4F), Mild Pain (1 - 3)  aluminum hydroxide/magnesium hydroxide/simethicone Suspension 30 milliLiter(s) Oral every 4 hours PRN Dyspepsia  heparin   Injectable 7000 Unit(s) IV Push every 6 hours PRN For aPTT less than 40  heparin   Injectable 3500 Unit(s) IV Push every 6 hours PRN For aPTT between 40 - 57  melatonin 3 milliGRAM(s) Oral at bedtime PRN Insomnia  ondansetron Injectable 4 milliGRAM(s) IV Push every 8 hours PRN Nausea and/or Vomiting    CAPILLARY BLOOD GLUCOSE        I&O's Summary    12 Sep 2023 07:01  -  13 Sep 2023 07:00  --------------------------------------------------------  IN: 1832 mL / OUT: 200 mL / NET: 1632 mL    13 Sep 2023 07:01  -  13 Sep 2023 16:03  --------------------------------------------------------  IN: 320 mL / OUT: 0 mL / NET: 320 mL        Vital Signs Last 24 Hrs  T(C): 36.6 (13 Sep 2023 11:30), Max: 37.4 (12 Sep 2023 21:05)  T(F): 97.9 (13 Sep 2023 11:30), Max: 99.3 (12 Sep 2023 21:05)  HR: 90 (13 Sep 2023 11:30) (86 - 90)  BP: 99/63 (13 Sep 2023 11:30) (99/63 - 111/77)  BP(mean): --  RR: 17 (13 Sep 2023 11:30) (17 - 18)  SpO2: 100% (13 Sep 2023 11:30) (98% - 100%)    Parameters below as of 13 Sep 2023 11:30  Patient On (Oxygen Delivery Method): room air        LABS:                        8.2    19.56 )-----------( 333      ( 13 Sep 2023 06:55 )             27.5     09-13    139  |  112<H>  |  48<H>  ----------------------------<  109<H>  5.0   |  17<L>  |  1.62<H>    Ca    8.7      13 Sep 2023 06:55  Phos  3.5     09-13  Mg     2.60     09-13    TPro  5.8<L>  /  Alb  2.2<L>  /  TBili  0.6  /  DBili  x   /  AST  133<H>  /  ALT  61<H>  /  AlkPhos  643<H>  09-13    PTT - ( 13 Sep 2023 06:55 )  PTT:68.1 sec      Urinalysis Basic - ( 13 Sep 2023 06:55 )    Color: x / Appearance: x / SG: x / pH: x  Gluc: 109 mg/dL / Ketone: x  / Bili: x / Urobili: x   Blood: x / Protein: x / Nitrite: x   Leuk Esterase: x / RBC: x / WBC x   Sq Epi: x / Non Sq Epi: x / Bacteria: x        RADIOLOGY & ADDITIONAL TESTS:  Results Reviewed: Y  Imaging Personally Reviewed: Y  Electrocardiogram Personally Reviewed: Y    COORDINATION OF CARE:  Care Discussed with Consultants/Other Providers [Y/N]: Y  Prior or Outpatient Records Reviewed [Y/N]: Y  
PROGRESS NOTE:     Patient is a 58y old  Female who presents with a chief complaint of Worsening diarrhea, weight loss (08 Sep 2023 12:04)      SUBJECTIVE / OVERNIGHT EVENTS: No acute events.     ADDITIONAL REVIEW OF SYSTEMS:    MEDICATIONS  (STANDING):  allopurinol 100 milliGRAM(s) Oral daily  heparin  Infusion. 1100 Unit(s)/Hr (11 mL/Hr) IV Continuous <Continuous>  influenza   Vaccine 0.5 milliLiter(s) IntraMuscular once  pantoprazole    Tablet 40 milliGRAM(s) Oral two times a day  sertraline 25 milliGRAM(s) Oral daily  sevelamer carbonate 800 milliGRAM(s) Oral three times a day  sodium chloride 0.9%. 1000 milliLiter(s) (100 mL/Hr) IV Continuous <Continuous>  traZODone 50 milliGRAM(s) Oral at bedtime    MEDICATIONS  (PRN):  acetaminophen     Tablet .. 650 milliGRAM(s) Oral every 6 hours PRN Temp greater or equal to 38C (100.4F), Mild Pain (1 - 3)  aluminum hydroxide/magnesium hydroxide/simethicone Suspension 30 milliLiter(s) Oral every 4 hours PRN Dyspepsia  heparin   Injectable 7000 Unit(s) IV Push every 6 hours PRN For aPTT less than 40  heparin   Injectable 3500 Unit(s) IV Push every 6 hours PRN For aPTT between 40 - 57  melatonin 3 milliGRAM(s) Oral at bedtime PRN Insomnia  ondansetron Injectable 4 milliGRAM(s) IV Push every 8 hours PRN Nausea and/or Vomiting      CAPILLARY BLOOD GLUCOSE        I&O's Summary    08 Sep 2023 07:01  -  09 Sep 2023 07:00  --------------------------------------------------------  IN: 1983 mL / OUT: 0 mL / NET: 1983 mL        PHYSICAL EXAM:  Vital Signs Last 24 Hrs  T(C): 37 (09 Sep 2023 13:14), Max: 37 (09 Sep 2023 13:14)  T(F): 98.6 (09 Sep 2023 13:14), Max: 98.6 (09 Sep 2023 13:14)  HR: 84 (09 Sep 2023 13:14) (78 - 87)  BP: 106/73 (09 Sep 2023 13:14) (97/59 - 108/69)  BP(mean): --  RR: 18 (09 Sep 2023 13:14) (18 - 18)  SpO2: 100% (09 Sep 2023 13:14) (100% - 100%)    Parameters below as of 09 Sep 2023 13:14  Patient On (Oxygen Delivery Method): room air      General: Well-groomed, NAD, laying in bed, on RA  HEENT: PERRLA, EOMI, non-icteric  Neck:  symmetric,  JVD absent  Respiratory: Clear to ascultation bilaterally, no crackles/rales, no resp distress; no accessory muscle use  Cardiovascular: RRR, no murmurs/rubs/gallops  Abdomen: slight firmness in epigastrium, NT, ND  Extremities: non-pitting edema in LLE to the knee  Skin: No rashes or lesions noted  Neurological: Strength grossly intact  Psychiatry: AOx3, appropriate insight/judgement, appropriate affect    LABS:                        7.5    14.49 )-----------( 246      ( 08 Sep 2023 18:20 )             23.9     09-08    134<L>  |  102  |  95<H>  ----------------------------<  121<H>  4.4   |  20<L>  |  2.92<H>    Ca    8.1<L>      08 Sep 2023 18:20  Phos  4.8     09-08  Mg     3.20     09-08    TPro  6.5  /  Alb  2.7<L>  /  TBili  0.5  /  DBili  x   /  AST  96<H>  /  ALT  36<H>  /  AlkPhos  511<H>  09-08    PTT - ( 08 Sep 2023 09:40 )  PTT:60.1 sec      Urinalysis Basic - ( 08 Sep 2023 18:20 )    Color: x / Appearance: x / SG: x / pH: x  Gluc: 121 mg/dL / Ketone: x  / Bili: x / Urobili: x   Blood: x / Protein: x / Nitrite: x   Leuk Esterase: x / RBC: x / WBC x   Sq Epi: x / Non Sq Epi: x / Bacteria: x        Culture - Blood (collected 07 Sep 2023 06:39)  Source: .Blood Blood-Peripheral  Preliminary Report (09 Sep 2023 09:01):    No growth at 48 Hours    Culture - Blood (collected 07 Sep 2023 06:28)  Source: .Blood Blood-Peripheral  Preliminary Report (09 Sep 2023 09:01):    No growth at 48 Hours    Culture - Urine (collected 07 Sep 2023 00:41)  Source: Clean Catch Clean Catch (Midstream)  Final Report (09 Sep 2023 08:20):    <10,000 CFU/mL Normal Urogenital Dalia    Culture - Stool (collected 06 Sep 2023 19:14)  Source: .Stool Feces  Final Report (08 Sep 2023 19:29):    No enteric pathogens isolated.    (Stool culture examined for Salmonella,    Shigella, Campylobacter, Aeromonas, Plesiomonas,    Vibrio, E.coli O157 and Yersinia)        RADIOLOGY & ADDITIONAL TESTS:  Results Reviewed:   Imaging Personally Reviewed:  Electrocardiogram Personally Reviewed:    COORDINATION OF CARE:  Care Discussed with Consultants/Other Providers [Y/N]:  Prior or Outpatient Records Reviewed [Y/N]:  
[facility] Division of Hospital Medicine  Scot Rodriguez MD  In House Pager [phone]    Patient is a 58y old  Female who presents with a chief complaint of Worsening diarrhea, weight loss (12 Sep 2023 11:49)    SUBJECTIVE / OVERNIGHT EVENTS: no acute events. s/p EGD. MRI done this morning. patient has no complaints at bedside.     ROS: Denied Fever, Chill, CP, SOB, Abd pain, N/V/D, LE swelling or pain.     MEDICATIONS  (STANDING):  allopurinol 100 milliGRAM(s) Oral daily  heparin  Infusion. 1100 Unit(s)/Hr (11 mL/Hr) IV Continuous <Continuous>  influenza   Vaccine 0.5 milliLiter(s) IntraMuscular once  pantoprazole    Tablet 40 milliGRAM(s) Oral two times a day  sertraline 25 milliGRAM(s) Oral daily  sodium chloride 0.9%. 1000 milliLiter(s) (100 mL/Hr) IV Continuous <Continuous>  traZODone 50 milliGRAM(s) Oral at bedtime    MEDICATIONS  (PRN):  acetaminophen     Tablet .. 650 milliGRAM(s) Oral every 6 hours PRN Temp greater or equal to 38C (100.4F), Mild Pain (1 - 3)  aluminum hydroxide/magnesium hydroxide/simethicone Suspension 30 milliLiter(s) Oral every 4 hours PRN Dyspepsia  heparin   Injectable 7000 Unit(s) IV Push every 6 hours PRN For aPTT less than 40  heparin   Injectable 3500 Unit(s) IV Push every 6 hours PRN For aPTT between 40 - 57  melatonin 3 milliGRAM(s) Oral at bedtime PRN Insomnia  ondansetron Injectable 4 milliGRAM(s) IV Push every 8 hours PRN Nausea and/or Vomiting    CAPILLARY BLOOD GLUCOSE        I&O's Summary    11 Sep 2023 07:01  -  12 Sep 2023 07:00  --------------------------------------------------------  IN: 300 mL / OUT: 0 mL / NET: 300 mL    12 Sep 2023 07:01  -  12 Sep 2023 15:30  --------------------------------------------------------  IN: 200 mL / OUT: 0 mL / NET: 200 mL        Vital Signs Last 24 Hrs  T(C): 36.4 (12 Sep 2023 13:06), Max: 36.9 (11 Sep 2023 21:40)  T(F): 97.6 (12 Sep 2023 13:06), Max: 98.4 (11 Sep 2023 21:40)  HR: 88 (12 Sep 2023 13:06) (88 - 106)  BP: 110/67 (12 Sep 2023 13:06) (106/78 - 114/71)  BP(mean): --  RR: 18 (12 Sep 2023 13:06) (17 - 18)  SpO2: 97% (12 Sep 2023 13:06) (95% - 99%)    Parameters below as of 12 Sep 2023 13:06  Patient On (Oxygen Delivery Method): room air        LABS:                        7.8    27.50 )-----------( 313      ( 12 Sep 2023 06:50 )             25.9     09-12    140  |  111<H>  |  51<H>  ----------------------------<  117<H>  4.9   |  17<L>  |  1.60<H>    Ca    8.2<L>      12 Sep 2023 06:50  Phos  3.8     09-12  Mg     2.60     09-12    TPro  6.3  /  Alb  2.5<L>  /  TBili  0.6  /  DBili  x   /  AST  126<H>  /  ALT  57<H>  /  AlkPhos  631<H>  09-12    PT/INR - ( 11 Sep 2023 08:28 )   PT: 11.6 sec;   INR: 1.03 ratio         PTT - ( 12 Sep 2023 06:50 )  PTT:63.7 sec      Urinalysis Basic - ( 12 Sep 2023 06:50 )    Color: x / Appearance: x / SG: x / pH: x  Gluc: 117 mg/dL / Ketone: x  / Bili: x / Urobili: x   Blood: x / Protein: x / Nitrite: x   Leuk Esterase: x / RBC: x / WBC x   Sq Epi: x / Non Sq Epi: x / Bacteria: x        RADIOLOGY & ADDITIONAL TESTS:  Results Reviewed: Y  Imaging Personally Reviewed: Y  Electrocardiogram Personally Reviewed: Y    COORDINATION OF CARE:  Care Discussed with Consultants/Other Providers [Y/N]: Y  Prior or Outpatient Records Reviewed [Y/N]: Y  
[facility] Division of Hospital Medicine  Scot Rodriguez MD  In House Pager [phone]    Patient is a 58y old  Female who presents with a chief complaint of Worsening diarrhea, weight loss (10 Sep 2023 15:16)    SUBJECTIVE / OVERNIGHT EVENTS: no acute events. she endorsed feeling well. still having diarrhea and loose stool. no abd pain. tolerating oral diet.     ROS: Denied Fever, Chill, CP, SOB, Abd pain, N/V, LE swelling or pain.     MEDICATIONS  (STANDING):  allopurinol 100 milliGRAM(s) Oral daily  heparin  Infusion. 1100 Unit(s)/Hr (11 mL/Hr) IV Continuous <Continuous>  influenza   Vaccine 0.5 milliLiter(s) IntraMuscular once  pantoprazole    Tablet 40 milliGRAM(s) Oral two times a day  sertraline 25 milliGRAM(s) Oral daily  sodium chloride 0.9%. 1000 milliLiter(s) (100 mL/Hr) IV Continuous <Continuous>  traZODone 50 milliGRAM(s) Oral at bedtime    MEDICATIONS  (PRN):  acetaminophen     Tablet .. 650 milliGRAM(s) Oral every 6 hours PRN Temp greater or equal to 38C (100.4F), Mild Pain (1 - 3)  aluminum hydroxide/magnesium hydroxide/simethicone Suspension 30 milliLiter(s) Oral every 4 hours PRN Dyspepsia  heparin   Injectable 7000 Unit(s) IV Push every 6 hours PRN For aPTT less than 40  heparin   Injectable 3500 Unit(s) IV Push every 6 hours PRN For aPTT between 40 - 57  melatonin 3 milliGRAM(s) Oral at bedtime PRN Insomnia  ondansetron Injectable 4 milliGRAM(s) IV Push every 8 hours PRN Nausea and/or Vomiting    CAPILLARY BLOOD GLUCOSE        I&O's Summary    10 Sep 2023 07:01  -  11 Sep 2023 07:00  --------------------------------------------------------  IN: 100 mL / OUT: 0 mL / NET: 100 mL        Vital Signs Last 24 Hrs  T(C): 36.7 (11 Sep 2023 10:52), Max: 36.8 (10 Sep 2023 15:48)  T(F): 98 (11 Sep 2023 10:52), Max: 98.2 (10 Sep 2023 15:48)  HR: 99 (11 Sep 2023 10:52) (88 - 99)  BP: 111/83 (11 Sep 2023 10:52) (111/83 - 131/98)  BP(mean): --  RR: 18 (11 Sep 2023 10:52) (17 - 18)  SpO2: 100% (11 Sep 2023 10:52) (99% - 100%)    Parameters below as of 11 Sep 2023 05:39  Patient On (Oxygen Delivery Method): room air        LABS:                        8.3    15.74 )-----------( 305      ( 11 Sep 2023 08:28 )             28.1     09-11    138  |  108<H>  |  55<H>  ----------------------------<  106<H>  4.7   |  17<L>  |  1.58<H>    Ca    8.4      11 Sep 2023 08:28  Phos  3.3     09-11  Mg     2.90     09-11    TPro  6.7  /  Alb  2.4<L>  /  TBili  0.6  /  DBili  x   /  AST  145<H>  /  ALT  55<H>  /  AlkPhos  625<H>  09-11    PT/INR - ( 11 Sep 2023 08:28 )   PT: 11.6 sec;   INR: 1.03 ratio         PTT - ( 11 Sep 2023 08:28 )  PTT:60.5 sec      Urinalysis Basic - ( 11 Sep 2023 08:28 )    Color: x / Appearance: x / SG: x / pH: x  Gluc: 106 mg/dL / Ketone: x  / Bili: x / Urobili: x   Blood: x / Protein: x / Nitrite: x   Leuk Esterase: x / RBC: x / WBC x   Sq Epi: x / Non Sq Epi: x / Bacteria: x        RADIOLOGY & ADDITIONAL TESTS:  Results Reviewed: Y  Imaging Personally Reviewed: Y  Electrocardiogram Personally Reviewed: Y    COORDINATION OF CARE:  Care Discussed with Consultants/Other Providers [Y/N]: Y  Prior or Outpatient Records Reviewed [Y/N]: RUPAL  
PROGRESS NOTE:     Patient is a 58y old  Female who presents with a chief complaint of Worsening diarrhea, weight loss (08 Sep 2023 10:53)      SUBJECTIVE / OVERNIGHT EVENTS: No acute events.     ADDITIONAL REVIEW OF SYSTEMS:    MEDICATIONS  (STANDING):  allopurinol 100 milliGRAM(s) Oral daily  heparin  Infusion. 1100 Unit(s)/Hr (11 mL/Hr) IV Continuous <Continuous>  influenza   Vaccine 0.5 milliLiter(s) IntraMuscular once  pantoprazole    Tablet 40 milliGRAM(s) Oral two times a day  sertraline 25 milliGRAM(s) Oral daily  sevelamer carbonate 800 milliGRAM(s) Oral three times a day  sodium chloride 0.9%. 1000 milliLiter(s) (125 mL/Hr) IV Continuous <Continuous>  traZODone 50 milliGRAM(s) Oral at bedtime    MEDICATIONS  (PRN):  acetaminophen     Tablet .. 650 milliGRAM(s) Oral every 6 hours PRN Temp greater or equal to 38C (100.4F), Mild Pain (1 - 3)  aluminum hydroxide/magnesium hydroxide/simethicone Suspension 30 milliLiter(s) Oral every 4 hours PRN Dyspepsia  heparin   Injectable 7000 Unit(s) IV Push every 6 hours PRN For aPTT less than 40  heparin   Injectable 3500 Unit(s) IV Push every 6 hours PRN For aPTT between 40 - 57  melatonin 3 milliGRAM(s) Oral at bedtime PRN Insomnia  ondansetron Injectable 4 milliGRAM(s) IV Push every 8 hours PRN Nausea and/or Vomiting      CAPILLARY BLOOD GLUCOSE        I&O's Summary    07 Sep 2023 07:01  -  08 Sep 2023 07:00  --------------------------------------------------------  IN: 1262 mL / OUT: 0 mL / NET: 1262 mL        PHYSICAL EXAM:  Vital Signs Last 24 Hrs  T(C): 36.7 (08 Sep 2023 05:38), Max: 36.8 (07 Sep 2023 13:30)  T(F): 98 (08 Sep 2023 05:38), Max: 98.3 (07 Sep 2023 13:30)  HR: 82 (08 Sep 2023 05:38) (80 - 98)  BP: 104/75 (08 Sep 2023 05:38) (91/59 - 118/75)  BP(mean): --  RR: 18 (08 Sep 2023 05:38) (17 - 18)  SpO2: 99% (08 Sep 2023 05:38) (99% - 100%)    Parameters below as of 08 Sep 2023 05:38  Patient On (Oxygen Delivery Method): room air      General: Well-groomed, NAD, laying in bed, on RA  HEENT: PERRLA, EOMI, non-icteric  Neck:  symmetric,  JVD absent  Respiratory: Clear to ascultation bilaterally, no crackles/rales, no resp distress; no accessory muscle use  Cardiovascular: RRR, no murmurs/rubs/gallops  Abdomen: slight firmness in epigastrium, NT, ND  Extremities: non-pitting edema in LLE to the knee  Skin: No rashes or lesions noted  Neurological: Strength grossly intact  Psychiatry: AOx3, appropriate insight/judgement, appropriate affect    LABS:                        8.5    14.89 )-----------( 234      ( 08 Sep 2023 06:47 )             26.7     09-08    130<L>  |  95<L>  |  99<H>  ----------------------------<  114<H>  4.4   |  18<L>  |  3.25<H>    Ca    8.6      08 Sep 2023 06:47  Phos  5.3     09-08  Mg     3.30     09-08    TPro  6.4  /  Alb  2.7<L>  /  TBili  0.5  /  DBili  x   /  AST  91<H>  /  ALT  39<H>  /  AlkPhos  538<H>  09-08    PT/INR - ( 07 Sep 2023 06:28 )   PT: 12.8 sec;   INR: 1.15 ratio         PTT - ( 08 Sep 2023 09:40 )  PTT:60.1 sec      Urinalysis Basic - ( 08 Sep 2023 06:47 )    Color: x / Appearance: x / SG: x / pH: x  Gluc: 114 mg/dL / Ketone: x  / Bili: x / Urobili: x   Blood: x / Protein: x / Nitrite: x   Leuk Esterase: x / RBC: x / WBC x   Sq Epi: x / Non Sq Epi: x / Bacteria: x        Culture - Blood (collected 07 Sep 2023 06:39)  Source: .Blood Blood-Peripheral  Preliminary Report (08 Sep 2023 09:01):    No growth at 24 hours    Culture - Blood (collected 07 Sep 2023 06:28)  Source: .Blood Blood-Peripheral  Preliminary Report (08 Sep 2023 09:01):    No growth at 24 hours    Culture - Stool (collected 06 Sep 2023 19:14)  Source: .Stool Feces  Preliminary Report (07 Sep 2023 16:41):    No enteric pathogens to date: Final culture pending        RADIOLOGY & ADDITIONAL TESTS:  Results Reviewed:   Imaging Personally Reviewed:  Electrocardiogram Personally Reviewed:    COORDINATION OF CARE:  Care Discussed with Consultants/Other Providers [Y/N]:  Prior or Outpatient Records Reviewed [Y/N]:

## 2023-09-14 NOTE — DISCHARGE NOTE PROVIDER - NSFOLLOWUPCLINICS_GEN_ALL_ED_FT
Gastroenterology at [facility]  Gastroenterology  [address] [place] [id number]  Phone: [phone]  Fax:

## 2023-09-14 NOTE — PROGRESS NOTE ADULT - REASON FOR ADMISSION
Worsening diarrhea, weight loss

## 2023-09-14 NOTE — DISCHARGE NOTE PROVIDER - PROVIDER TOKENS
FREE:[LAST:[Solorzano],FIRST:[Pan],PHONE:[(   )    -],FAX:[(   )    -]] FREE:[LAST:[Solorzano],FIRST:[Pan],PHONE:[(   )    -],FAX:[(   )    -]],PROVIDER:[TOKEN:[[id number]]]

## 2023-09-14 NOTE — DISCHARGE NOTE PROVIDER - ATTENDING DISCHARGE PHYSICAL EXAMINATION:
Physical Exam:  GENERAL: no apparent distress  CHEST/LUNG: on RA; Clear to auscultation bilaterally; No wheezing; No crackles  HEART: Regular rate and rhythm; S1/S2 wnl; no obvious murmurs  ABDOMEN: Soft, Nontender, Nondistended; Bowel sounds present  EXTREMITIES:  2+ Peripheral Pulses, No edema, non-tender, no erythema.   PSYCH: normal affect, calm demeanor  NEUROLOGY: AAOX3, CN 2-12 grossly intact, no obvious FND

## 2023-09-14 NOTE — DISCHARGE NOTE PROVIDER - DETAILS OF MALNUTRITION DIAGNOSIS/DIAGNOSES
This patient has been assessed with a concern for Malnutrition and was treated during this hospitalization for the following Nutrition diagnosis/diagnoses:     -  09/07/2023: Moderate protein-calorie malnutrition

## 2023-09-14 NOTE — PROGRESS NOTE ADULT - ASSESSMENT
58F PMH HTN p/t ED with 30lbs weight loss in 3 months and diarrhea, likely 2/2 metastatic cancer. Primary cancer currently unknown, biopsy pending, likely requires palliative chemotherapy.    PLAN  - Pt not surgical candidate at this time given metastatic disease  - Added on MMR, Foundation, KRAS, BRAF studies to GI pathology samples, f/u results.   - recommend outpt f/u and palliative chemotherapy    Discussed with Dr Keely [facility] team  [id number] 58F PMH HTN p/t ED with 30lbs weight loss in 3 months and diarrhea, likely 2/2 metastatic cancer. Primary cancer currently unknown, biopsy pending, likely requires palliative chemotherapy.    PLAN  - Pt not surgical candidate at this time given metastatic disease  - recommend med/onc consultation for establishing chemotherapy  - Added on MMR, Foundation, KRAS, BRAF studies to GI pathology samples, f/u results.   - recommend outpt f/u and palliative chemotherapy    Discussed with Dr Keely [facility] team  [id number] 58F PMH HTN p/t ED with 30lbs weight loss in 3 months and diarrhea, likely 2/2 metastatic cancer. Primary cancer currently unknown, biopsy pending, likely requires palliative chemotherapy.    PLAN  - Pt not surgical candidate at this time given metastatic disease  - f/u med/onc recommendations  - Added on MMR, Foundation, KRAS, BRAF studies to GI pathology samples, f/u results.   - recommend outpt f/u and palliative chemotherapy    Discussed with Dr Keely [facility] team  [id number]

## 2023-09-18 NOTE — H&P ADULT - NSHPPHYSICALEXAM_GEN_ALL_CORE
Vital Signs Last 24 Hrs  T(C): 36.3 (18 Sep 2023 12:22), Max: 36.3 (18 Sep 2023 12:22)  T(F): 97.3 (18 Sep 2023 12:22), Max: 97.3 (18 Sep 2023 12:22)  HR: 94 (18 Sep 2023 12:22) (94 - 94)  BP: 114/85 (18 Sep 2023 12:22) (114/85 - 114/85)  BP(mean): --  RR: 22 (18 Sep 2023 12:22) (22 - 22)  SpO2: 99% (18 Sep 2023 12:22) (99% - 99%)    GENERAL: in acute distress due to back pain, nontoxic appearing,   HEENT:  Atraumatic, Normocephalic, Conjunctiva and sclera clear, oral mucosa moist, clear w/o any exudate   NECK: Supple, No JVD  CHEST/LUNG: Clear to auscultation bilaterally; No wheeze  HEART: Regular rate and rhythm; No murmurs, rubs, or gallops  ABDOMEN: Soft, distended, nontender to palpation; Bowel sounds present  EXTREMITIES:  2+ Peripheral Pulses, No clubbing, cyanosis, or edema  PSYCH: AAOx3  NEUROLOGY: non-focal, moving all extremities   SKIN: No rashes or lesions Vital Signs Last 24 Hrs  T(C): 36.3 (18 Sep 2023 12:22), Max: 36.3 (18 Sep 2023 12:22)  T(F): 97.3 (18 Sep 2023 12:22), Max: 97.3 (18 Sep 2023 12:22)  HR: 94 (18 Sep 2023 12:22) (94 - 94)  BP: 114/85 (18 Sep 2023 12:22) (114/85 - 114/85)  BP(mean): --  RR: 22 (18 Sep 2023 12:22) (22 - 22)  SpO2: 99% (18 Sep 2023 12:22) (99% - 99%)    GENERAL: in acute distress due to back pain, nontoxic appearing,   HEENT:  Atraumatic, Normocephalic, Conjunctiva and sclera clear, oral mucosa moist, clear w/o any exudate   NECK: Supple, No JVD  CHEST/LUNG: Clear to auscultation bilaterally; No wheeze  HEART: Regular rate and rhythm; No murmurs, rubs, or gallops  ABDOMEN: Soft, distended, nontender to palpation; Bowel sounds present  EXTREMITIES:  2+ Peripheral Pulses, 3+ pitting edema b/l   PSYCH: AAOx3  NEUROLOGY: non-focal, moving all extremities   SKIN: No rashes or lesions

## 2023-09-18 NOTE — H&P ADULT - PROBLEM SELECTOR PLAN 3
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 IR was consulted for pancreatic mass biopsy but pt deferred  -Biopsy results showed adenocarcinoma-unknown primary   -Heme/onc were consulted by the ED, f/u with recs   -Surgery was consulted during last admission, pt not candidate for surgery given metastasis

## 2023-09-18 NOTE — ED PROVIDER NOTE - ATTENDING CONTRIBUTION TO CARE
Patient with complex medical history including newly diagnosed abdominal cancer with peritoneal carcinomatosis, pancreatic mass, colon mass, unclear primary presents the emergency department with bloody stool in the setting of Eliquis use. 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  She is currently in the process of establishing outpatient oncological care, there was a plan to perform pancreatic biopsy with IR while hospitalized recently however patient wanted to continue her work-up as an outpatient however has not established definitive care yet with oncology.  On exam she has stable vital signs, no abdominal discomfort on exam, no active bleeding currently.  Plan for labs to evaluate for anemia.

## 2023-09-18 NOTE — H&P ADULT - PROBLEM SELECTOR PLAN 7
Patient with elevated LFT in the setting of liver mets   -Worsening compared to prior admission   -Denies any abdominal pain, jaundice, or fever   -US abdomen   -Monitor LFTs

## 2023-09-18 NOTE — H&P ADULT - PROBLEM SELECTOR PLAN 2
Patient with acute on chronic diarrhea   -Had similar diarrhea during last admission  -GI PCR, C.diff were negative on 9/6/2023   -Will send repeat GI PCR   -Most likely due to malignancy

## 2023-09-18 NOTE — ED ADULT NURSE NOTE - NS ED NURSE RECORD ANOTHER HT AND WT
Problem: Patient Care Overview  Goal: Plan of Care Review  Outcome: Ongoing (interventions implemented as appropriate)   [id number]   Coping/Psychosocial   Care Plan Reviewed With mother;father   Plan of Care Review   Progress improving   OTHER   Outcome Summary Monitoring Ann Scores.        Problem:  ([place],NICU)  Goal: Signs and Symptoms of Listed Potential Problems Will be Absent, Minimized or Managed ()  Outcome: Ongoing (interventions implemented as appropriate)      Problem: Substance Exposed/ Abstinence (Pediatric,[place],NICU)  Goal: Identify Related Risk Factors and Signs and Symptoms  Outcome: Ongoing (interventions implemented as appropriate)    Goal: Adequate Sleep and Nutrition to Enable Consistent Weight Gain  Outcome: Ongoing (interventions implemented as appropriate)    Goal: Integration Into Biopsychosocial Environment  Outcome: Ongoing (interventions implemented as appropriate)         No

## 2023-09-18 NOTE — H&P ADULT - PROBLEM SELECTOR PLAN 5
Patient with MESHA and metabolic acidosis with elevated AG (chronic)  -Pt was discharged with Scr of 1.6, now increased to 2.13   -Most likely due to diarrhea, dehydration, and sepsis   -Will give 500 cc LR given volume overloaded on exam   -Trend SCr   -F/u with UA   -US abdomen

## 2023-09-18 NOTE — ED ADULT TRIAGE NOTE - CHIEF COMPLAINT QUOTE
was recently with Ca pancreatic? has been nervous  pt appears SOB at triage time takes" eliquis for blood clots in legs"

## 2023-09-18 NOTE — H&P ADULT - PROBLEM SELECTOR PLAN 8
Patient with recent hx of TLS   -Now with elevated potasium, LDH. Urine acid is 5.7   -C/w allopurinol  -Trend TLS labs   -F/u with Heme/onc recs

## 2023-09-18 NOTE — H&P ADULT - NSICDXPASTMEDICALHX_GEN_ALL_CORE_FT
PAST MEDICAL HISTORY:  MESHA (acute kidney injury)     HTN (hypertension)     Malignancy     Tumor lysis syndrome

## 2023-09-18 NOTE — H&P ADULT - ASSESSMENT
57 yo F with Pmhx of HTN and unknown primary metastatic cancer and DVT on eliquis presents to the ED with bloody diarrhea, found to have SIRs, MESHA, hyperkalemia, lactic acidosis, elevated LFTs most likely 2/2 worsening malignancy

## 2023-09-18 NOTE — ED PROVIDER NOTE - PHYSICAL EXAMINATION
Const: not in acute distress  Eyes: no conjunctival injection  HEENT: Head NCAT, Moist MM.  Neck: Trachea midline.   CVS: +S1/S2, Peripheral pulses 2+ and equal in all extremities.  RESP: Unlabored respiratory effort. Clear to auscultation bilaterally.  GI: Nontender/Nondistended, No CVA tenderness b/l.   MSK: Normocephalic/Atraumatic, No Lower Extremities edema b/l.   Skin: Intact.   Neuro:  Motor & Sensation grossly intact.  Psych: Awake, Alert, & Cooperative

## 2023-09-18 NOTE — ED PROVIDER NOTE - IV ALTEPLASE DOOR HIDDEN
Enuresis  -     URINALYSIS NONAUTO W/O SCOPE  Normal urine  Go to bathroom every hour, extra push to empty bladder completely, limit fluids in evening  High fiber diet-fruits (skin has extra fiber, prunes, pears, berries), vegetables especially carrots and sweet potatoes, salads, grain bread, water, dried fruit, oatmeal  Limited bananas, rice, pasta, potatoes, white bread, pretzels, crackers, cheese    Viral upper respiratory tract infection  Cough and congestion can last 7-10 days  Fever can last up to 5 days with viruses  Fluids, honey for cough, elevate head to sleep, humidifier  Vics on chest or feet for congestion  Tylenol or ibuprofen for fever or pain, no need to alternate  Call for more than 5 days of fever or trouble breathing    Will see how he does in spring  Can give zyrtec 5 ml daily for clear runny nose, sneezing, itchy nose  If not improving make appt      Tylenol/Acetaminophen Dosing    Please dose every 4 hours as needed, do not give more than 5 doses in any 24 hour period  Children's Oral Suspension = 160 mg/5ml  Childrens Chewable = 80 mg  Jr Strength Chewables= 160 mg  Regular Strength Caplet = 325 mg  Extra Strength Caplet = 500 mg                                                            Tylenol suspension   Childrens Chewable   Jr.  Strength Chewable    Regular strength   Extra  Strength                                                                                                                                                   Caplet                   Caplet   6-11 lbs                 1.25 ml  12-17 lbs               2.5 ml  18-23 lbs               3.75 ml  24-35 lbs               5 ml                          2                              1  36-47 lbs               7.5 ml                       3                              1&1/2  48-59 lbs               10 ml                        4                              2                       1  60-71 lbs               12.5 ml 5                              2&1/2  72-95 lbs               15 ml                        6                              3                       1&1/2             1  96 lbs and over     20 ml                                                        4                        2                    1                            Ibuprofen/Advil/Motrin Dosing    Ibuprofen is dosed every 6-8 hours as needed  Never give more than 4 doses in a 24 hour period  Please note the difference in the strengths between infant and children's ibuprofen  Do not give ibuprofen to children under 10months of age unless advised by your doctor    Infant Concentrated drops = 50 mg/1.25ml  Children's suspension =100 mg/5 ml  Children's chewable = 100mg  Ibuprofen tablets =200mg                                 Infant concentrated      Childrens               Chewables        Adult tablets                                    Drops                      Suspension                12-17 lbs                1.25 ml  18-23 lbs                1.875 ml  24-35 lbs                2.5 ml                            5 ml                             1  36-47 lbs                                                      7.5 ml           48-59 lbs                                                      10 ml                           2               1 tablet  60-71 lbs                                                      12.5 ml            72-95 lbs                                                      15 ml                           3               1&1/2 tablets  96 lbs and over                                             20 ml                          4                2 tablets show

## 2023-09-18 NOTE — H&P ADULT - NSHPLABSRESULTS_GEN_ALL_CORE
8.3    24.86 )-----------( 168      ( 18 Sep 2023 14:18 )             28.0     Hgb Trend: 8.3<--, 8.3<--, 8.2<--, 7.8<--  09-18    141  |  107  |  68<H>  ----------------------------<  118<H>  5.7<H>   |  16<L>  |  2.13<H>    Ca    9.1      18 Sep 2023 15:25  Phos  4.5     09-18  Mg     2.90     09-18    TPro  7.3  /  Alb  2.7<L>  /  TBili  0.8  /  DBili  x   /  AST  172<H>  /  ALT  69<H>  /  AlkPhos  1070<H>  09-18    Creatinine Trend: 2.13<--, 2.02<--, 1.77<--, 1.62<--, 1.60<--, 1.58<--  PT/INR - ( 18 Sep 2023 15:19 )   PT: 17.7 sec;   INR: 1.60 ratio         PTT - ( 18 Sep 2023 15:19 )  PTT:27.5 sec      Urinalysis Basic - ( 18 Sep 2023 15:25 )    Color: x / Appearance: x / SG: x / pH: x  Gluc: 118 mg/dL / Ketone: x  / Bili: x / Urobili: x   Blood: x / Protein: x / Nitrite: x   Leuk Esterase: x / RBC: x / WBC x   Sq Epi: x / Non Sq Epi: x / Bacteria: x      EKG is reviewed by me         MR Abdomen as reviewed by the radiologist:     Moderate ascites. Hepatic and peritoneal metastatic disease.  *  Mass in the body of the pancreas measuring 2.4 cm suggest pancreatic   adenocarcinoma.

## 2023-09-18 NOTE — ED ADULT NURSE NOTE - OBJECTIVE STATEMENT
Pt to bed 22 . Pt c/o bloody stool. Pt on blood thinners. Pt noted to have ascites and b.l leg swelling. Known DVT's. Pt in no denies any CP. Will continue to monitor.

## 2023-09-18 NOTE — H&P ADULT - HISTORY OF PRESENT ILLNESS
57 yo F with Pmhx of HTN and unknown primary metastatic cancer and DVT on eliquis presents to the ED with bloody diarrhea. Patient reports that she was discharged from the hospital on 9/14. Since discharge she has been stable w/o any diarrhea until yesterday. She has been having watery, diarrhea multiple times per day since yesterday. It is brown in color and it sometimes has some streaks of blood mixed with it. She denies any melena or hematochezia. She has nausea and has been vomiting up food materials. She also reports to have worsening b/l LE edema which got worse over the last couple of days. She has been drinking 2-3 500cc bottle of water every day but her PO intake has also decreased. Of note. she was recently diagnosed with metastatic cancer-unknown primary. She underwent EGD and colonoscopy, pending path results. Her hospitalization was complicated by TLS and MESHA which improved prior to discharge   In the ED, her vitals were notable for tachycardia. Labs were notable for chronic but stable anemia, leukocytosis, elevated INR, PT, elevated LDH, hyperkalemia, low bicarb, elevated AG, elevated BUN/Scr, elevated ALP, AST/ALT, pH of 7.27->7.30, lactic acidosis. EKG showed sinus tachycardia.

## 2023-09-18 NOTE — H&P ADULT - PROBLEM SELECTOR PLAN 4
Patient reports to have diarrhea and some blood mixed with the stool   -Hgb is stable at 8.3. similar to prior admission   -Most likely due to known colonic mass   -Hold eliquis for now   -Consider GI consult if hgb is downtrending   -trend CBC daily for now   -Active type and screen   -Transfuse if Hgb <7

## 2023-09-18 NOTE — H&P ADULT - NSHPREVIEWOFSYSTEMS_GEN_ALL_CORE
REVIEW OF SYSTEMS:    CONSTITUTIONAL: No weakness, fevers or chills  EYES/ENT: No visual changes;  No vertigo or throat pain   NECK: No pain or stiffness  RESPIRATORY: No cough, wheezing, hemoptysis; No shortness of breath  CARDIOVASCULAR: No chest pain or palpitations  GASTROINTESTINAL: Abdominal distention, +nausea, +vomiting, no melena   GENITOURINARY: No dysuria, frequency or hematuria  NEUROLOGICAL: No numbness or weakness  MUSCULOSKELETAL: No joint pain, no muscle ache   SKIN: No itching, burning, rashes, or lesions   All other review of systems is negative unless indicated above.

## 2023-09-18 NOTE — ED PROVIDER NOTE - OBJECTIVE STATEMENT
58-year-old female with past medical history of hypertension with history of MESHA, severe uremia, metastatic cancer with colon and pancreatic masses, presenting with continued melanotic stool and bright red blood per rectum.  Patient was recently admitted for melanotic stool.  Patient was found to have a DVT in left lower extremity, started on Eliquis.  Patient reports both coffee-ground emesis and melenic stool.  Reports fatigue, lightheadedness.  Denies chest pain, shortness of breath, abdominal pain.

## 2023-09-18 NOTE — ED PROVIDER NOTE - CLINICAL SUMMARY MEDICAL DECISION MAKING FREE TEXT BOX
58-year-old female with past medical history of hypertension with history of MESHA, severe uremia, metastatic cancer with colon and pancreatic masses, presenting with continued melanotic stool and bright red blood per rectum.  Hemodynamically stable.  Physical exam with heart regular rate and rhythm, lungs clear to auscultation, abdomen soft nondistended nontender.  Concern for anemia given conjunctival pallor.  Will get labs.

## 2023-09-18 NOTE — H&P ADULT - PROBLEM SELECTOR PLAN 6
Patient with hyperkalemia in the setting of MESHA   -S/p calcium gluconate, insulin/D50, and lokelma   -EKG shows no peaked T wave   -F/u with repeat BMP Patient with hyperkalemia in the setting of MESHA   -S/p calcium gluconate, insulin/D50, and lokelma   -EKG shows no peaked T wave   -repeat BMP showed K of 5.7 from 6.9   -Will give additional lokelma 10 g x1   -F/u with repeat BMP

## 2023-09-18 NOTE — H&P ADULT - PROBLEM SELECTOR PLAN 1
Patient with tachycardia, leukocytosis, lactic acidosis, MESHA consistent with severe sepsis   -Reports to have acute on chronic diarrhea   -Will send bcx, UA, GI PCR   -Most likely due to worsening malignancy   -Monitor off abx for now   -s/p IV  cc in the ED   -Repeat lactic prior to fluids is at 2.8->3. F/u with repeat lactate in the AM   -if febrile, can start on IV cefepime and vanco Patient with tachycardia, leukocytosis, lactic acidosis, MESHA consistent with severe sepsis   -Reports to have acute on chronic diarrhea   -Will send bcx, UA, GI PCR   -Most likely due to worsening malignancy   -Monitor off abx for now   -s/p IV  cc in the ED   -Repeat lactic prior to fluids is at 2.8->3. F/u with repeat lactate in the AM   -if febrile, can start broad IV abx

## 2023-09-19 NOTE — PROGRESS NOTE ADULT - PROBLEM SELECTOR PLAN 3
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 IR was consulted for pancreatic mass biopsy but pt deferred  -Biopsy results showed adenocarcinoma-unknown primary   -Heme/onc were consulted by the ED, f/u with recs   -Surgery was consulted during last admission, pt not candidate for surgery given metastasis  - US of abdomen shows  mets in  liver

## 2023-09-19 NOTE — PROGRESS NOTE ADULT - PROBLEM SELECTOR PLAN 9
2023 22:06 DVT ppx holding eliquis due to recent report of GI bleeding, will resume as benefit outweighs risk with B/L DVT in setting of malignancy  Prognosis guarded   plan of care d/w pt and ACP

## 2023-09-19 NOTE — PROGRESS NOTE ADULT - PROBLEM SELECTOR PLAN 5
Patient with MESHA and metabolic acidosis with elevated AG (chronic)  -Pt was discharged with Scr of 1.6, now increased to 2.13 -->2.72  -Most likely due to diarrhea, dehydration, and sepsis   - Pt s/p IVF, will give gentle hydration NS at 75ml /hr for 12 hrs   -Trend SCr   -F/u with UA   -US abdomen

## 2023-09-19 NOTE — CHART NOTE - NSCHARTNOTEFT_GEN_A_CORE
spoke to oncology service- don't need to be seen in pt. will follow up with oncology once discharge.

## 2023-09-19 NOTE — PROGRESS NOTE ADULT - PROBLEM SELECTOR PLAN 4
Patient reports to have diarrhea and some blood mixed with the stool   -Hgb  8.3-->7.7 . pt reports no blood in stool, slight downtrend ,=may be dilutional to IVF   -Most likely due to known colonic mass   -Holding  eliquis , will resume as pt with B/L DVT ,  -will stop Eliquis and  Consider GI consult if hgb is downtrending   -trend CBC daily for now   -Active type and screen   -Transfuse if Hgb <7

## 2023-09-19 NOTE — PROGRESS NOTE ADULT - PROBLEM SELECTOR PLAN 2
Patient with acute on chronic diarrhea   -Had similar diarrhea during last admission  -GI PCR, C.diff were negative on 9/6/2023   -Will send repeat GI PCR   -Most likely due to malignancy   - pt says diarrhea has now resolved , will CTM

## 2023-09-19 NOTE — PROGRESS NOTE ADULT - PROBLEM SELECTOR PLAN 1
Patient with tachycardia, leukocytosis, lactic acidosis, MESHA consistent with severe sepsis   -Reports to have acute on chronic diarrhea , diarrhea now resolved, per patient   -Will send bcx, UA, GI PCR   -Most likely due to worsening malignancy   -Monitor off abx for now   -s/p IV  cc in the ED   -Repeat lactic prior to fluids is at 2.8->3. F/u with repeat lactate , ordered stat   -if febrile, can start broad IV abx

## 2023-09-19 NOTE — PROGRESS NOTE ADULT - PROBLEM SELECTOR PLAN 7
Patient with elevated LFT in the setting of liver mets   -Worsening compared to prior admission   -Denies any abdominal pain, jaundice, or fever   -US abdomen as above   -Monitor LFTs

## 2023-09-19 NOTE — PROGRESS NOTE ADULT - PROBLEM SELECTOR PLAN 6
Patient with hyperkalemia in the setting of MESHA   -S/p calcium gluconate, insulin/D50, and lokelma   -EKG shows no peaked T wave   -repeat BMP showed K of 5.7 from 6.9   -Will give additional lokelma 10 g x1   -F/u with repeat BMP

## 2023-09-19 NOTE — PROGRESS NOTE ADULT - SUBJECTIVE AND OBJECTIVE BOX
Patient is a 58y old  Female who presents with a chief complaint of Diarrhea, blood in stool (18 Sep 2023 17:19)      SUBJECTIVE / OVERNIGHT EVENTS:    MEDICATIONS  (STANDING):  allopurinol 100 milliGRAM(s) Oral daily  HYDROmorphone  Injectable 0.25 milliGRAM(s) IV Push once  lactated ringers. 1000 milliLiter(s) (250 mL/Hr) IV Continuous <Continuous>  pantoprazole    Tablet 40 milliGRAM(s) Oral before breakfast  sertraline 25 milliGRAM(s) Oral daily  traZODone 50 milliGRAM(s) Oral at bedtime    MEDICATIONS  (PRN):  aluminum hydroxide/magnesium hydroxide/simethicone Suspension 30 milliLiter(s) Oral every 4 hours PRN Dyspepsia  melatonin 3 milliGRAM(s) Oral at bedtime PRN Insomnia  ondansetron Injectable 4 milliGRAM(s) IV Push every 8 hours PRN Nausea and/or Vomiting  oxyCODONE    IR 5 milliGRAM(s) Oral every 6 hours PRN Severe Pain (7 - 10)      Vital Signs Last 24 Hrs  T(C): 36.4 (19 Sep 2023 05:20), Max: 36.4 (18 Sep 2023 21:15)  T(F): 97.6 (19 Sep 2023 05:20), Max: 97.6 (18 Sep 2023 21:15)  HR: 82 (19 Sep 2023 05:20) (82 - 94)  BP: 102/63 (19 Sep 2023 05:20) (102/63 - 117/60)  BP(mean): --  RR: 18 (19 Sep 2023 05:20) (18 - 22)  SpO2: 96% (19 Sep 2023 05:20) (96% - 99%)    Parameters below as of 19 Sep 2023 05:20  Patient On (Oxygen Delivery Method): room air      CAPILLARY BLOOD GLUCOSE      POCT Blood Glucose.: 120 mg/dL (18 Sep 2023 21:37)    I&O's Summary    18 Sep 2023 07:01  -  19 Sep 2023 07:00  --------------------------------------------------------  IN: 500 mL / OUT: 300 mL / NET: 200 mL        PHYSICAL EXAM:  GENERAL: NAD, well-developed  HEAD:  Atraumatic, Normocephalic  EYES: EOMI, PERRLA, conjunctiva and sclera clear  NECK: Supple, No JVD  CHEST/LUNG: Clear to auscultation bilaterally; No wheeze  HEART: Regular rate and rhythm; No murmurs, rubs, or gallops  ABDOMEN: Soft, Nontender, Nondistended; Bowel sounds present  EXTREMITIES:  2+ Peripheral Pulses, No clubbing, cyanosis, or edema  PSYCH: AAOx3  NEUROLOGY: non-focal  SKIN: No rashes or lesions    LABS:                        7.7    19.59 )-----------( 158      ( 19 Sep 2023 08:06 )             26.3     09-19    142  |  107  |  83<H>  ----------------------------<  102<H>  5.5<H>   |  16<L>  |  2.72<H>    Ca    8.8      19 Sep 2023 08:06  Phos  5.1     09-19  Mg     2.70     09-19    TPro  5.8<L>  /  Alb  2.4<L>  /  TBili  0.8  /  DBili  x   /  AST  149<H>  /  ALT  60<H>  /  AlkPhos  896<H>  09-19    PT/INR - ( 18 Sep 2023 15:19 )   PT: 17.7 sec;   INR: 1.60 ratio         PTT - ( 18 Sep 2023 15:19 )  PTT:27.5 sec      Urinalysis Basic - ( 19 Sep 2023 08:06 )    Color: x / Appearance: x / SG: x / pH: x  Gluc: 102 mg/dL / Ketone: x  / Bili: x / Urobili: x   Blood: x / Protein: x / Nitrite: x   Leuk Esterase: x / RBC: x / WBC x   Sq Epi: x / Non Sq Epi: x / Bacteria: x        RADIOLOGY & ADDITIONAL TESTS:    Imaging Personally Reviewed:    Consultant(s) Notes Reviewed:      Care Discussed with Consultants/Other Providers:   Patient is a 58y old  Female who presents with a chief complaint of Diarrhea, blood in stool (18 Sep 2023 17:19)      SUBJECTIVE / OVERNIGHT EVENTS: patient seen and examined by bedside, pt says diarrhea has resolved, pt c/o back pain, which she says is because of the bed , pt denies headache, dizziness, SOB, CP, Palpitations , N/V/, abdominal pain      MEDICATIONS  (STANDING):  allopurinol 100 milliGRAM(s) Oral daily  HYDROmorphone  Injectable 0.25 milliGRAM(s) IV Push once  lactated ringers. 1000 milliLiter(s) (250 mL/Hr) IV Continuous <Continuous>  pantoprazole    Tablet 40 milliGRAM(s) Oral before breakfast  sertraline 25 milliGRAM(s) Oral daily  traZODone 50 milliGRAM(s) Oral at bedtime    MEDICATIONS  (PRN):  aluminum hydroxide/magnesium hydroxide/simethicone Suspension 30 milliLiter(s) Oral every 4 hours PRN Dyspepsia  melatonin 3 milliGRAM(s) Oral at bedtime PRN Insomnia  ondansetron Injectable 4 milliGRAM(s) IV Push every 8 hours PRN Nausea and/or Vomiting  oxyCODONE    IR 5 milliGRAM(s) Oral every 6 hours PRN Severe Pain (7 - 10)      Vital Signs Last 24 Hrs  T(C): 36.4 (19 Sep 2023 05:20), Max: 36.4 (18 Sep 2023 21:15)  T(F): 97.6 (19 Sep 2023 05:20), Max: 97.6 (18 Sep 2023 21:15)  HR: 82 (19 Sep 2023 05:20) (82 - 94)  BP: 102/63 (19 Sep 2023 05:20) (102/63 - 117/60)  BP(mean): --  RR: 18 (19 Sep 2023 05:20) (18 - 22)  SpO2: 96% (19 Sep 2023 05:20) (96% - 99%)    Parameters below as of 19 Sep 2023 05:20  Patient On (Oxygen Delivery Method): room air      CAPILLARY BLOOD GLUCOSE      POCT Blood Glucose.: 120 mg/dL (18 Sep 2023 21:37)    I&O's Summary    18 Sep 2023 07:01  -  19 Sep 2023 07:00  --------------------------------------------------------  IN: 500 mL / OUT: 300 mL / NET: 200 mL        PHYSICAL EXAM:  GENERAL: NAD, well-developed  HEAD:  Atraumatic, Normocephalic  EYES: EOMI, PERRLA, conjunctiva and sclera clear  NECK: Supple, No JVD  CHEST/LUNG: Clear to auscultation bilaterally; No wheeze  HEART: Regular rate and rhythm; No murmurs, rubs, or gallops  ABDOMEN: Soft, Nontender, Nondistended; Bowel sounds present  EXTREMITIES:  2+ Peripheral Pulses, No clubbing, cyanosis,  3+ pitting edema b/l   PSYCH: AAOx3  NEUROLOGY: non-focal  SKIN: No rashes or lesions    LABS:                        7.7    19.59 )-----------( 158      ( 19 Sep 2023 08:06 )             26.3     09-19    142  |  107  |  83<H>  ----------------------------<  102<H>  5.5<H>   |  16<L>  |  2.72<H>    Ca    8.8      19 Sep 2023 08:06  Phos  5.1     09-19  Mg     2.70     09-19    TPro  5.8<L>  /  Alb  2.4<L>  /  TBili  0.8  /  DBili  x   /  AST  149<H>  /  ALT  60<H>  /  AlkPhos  896<H>  09-19    PT/INR - ( 18 Sep 2023 15:19 )   PT: 17.7 sec;   INR: 1.60 ratio         PTT - ( 18 Sep 2023 15:19 )  PTT:27.5 sec      Urinalysis Basic - ( 19 Sep 2023 08:06 )    Color: x / Appearance: x / SG: x / pH: x  Gluc: 102 mg/dL / Ketone: x  / Bili: x / Urobili: x   Blood: x / Protein: x / Nitrite: x   Leuk Esterase: x / RBC: x / WBC x   Sq Epi: x / Non Sq Epi: x / Bacteria: x        RADIOLOGY & ADDITIONAL TESTS:  < from: US Abdomen Complete (US Abdomen Complete .) (09.18.23 @ 18:55) >  FINDINGS:  Liver: Nodular contour of the liver with innumerable hypoechoic hepatic   lesions compatible with metastases. These are visualized in detail on the   recent MRI of the abdomen.  Bile ducts: Normal caliber. Common bile duct measures 6 mm.  Gallbladder: Wall echo shadow complex suggesting stone filled gallbladder.  Pancreas: Visualized portions are within normal limits.  Spleen: 9.9 cm. Within normal limits.  Right kidney: 9.5 cm. No hydronephrosis.  Left kidney: 9.9 cm. No hydronephrosis.  Ascites: Small volume upper abdominal ascites.  Aorta and IVC: Visualized portions are within normal limits.    IMPRESSION:  Innumerable hepatic metastases. Normal caliber bile ducts.    < end of copied text >    Imaging Personally Reviewed:    Consultant(s) Notes Reviewed:      Care Discussed with Consultants/Other Providers:

## 2023-09-19 NOTE — CHART NOTE - NSCHARTNOTEFT_GEN_A_CORE
oncology chart note     59yo Female w/ mhx HTN who initially presented on 9/6/23 with worsening diarrhea for several months, weight loss ~30lb in 3 months, and several episodes of black stool. New bilateral DVT found on duplex. Oncology was consulted for abd masses seen CT during that admission.     - of note, She initially visited ED in 7/2023 for black stools, n/v, and occasional diarrhea.  Was scheduled for EGD/colonoscopy in 11/2023. No previous colonscopy.   Denied vaginal bleeding.      -CT abd/p without contrast on 9/6/23 showing Limited noncontrast study. evidence of metastatic disease characterized by multiple bilobar liver lesions and peritoneal carcinomatosis/malignant ascites. Several areas of colonic wall thickening, question serosal implants involving those organs versus primary neoplasm. Fissural nodularity and subpleural and tubular nodules at the visualized lung bases, nonspecific.     -9/7 CT chest no contrast Visual metastasis. Scattered nodularity, some which is somewhat tubular measures up to 1.5 cm are indeterminate. Right cardiophrenic lymph nodes. Several nodules measuring up to 9 mm adjacent to the pericardium in the right cardiophrenic region that may represent additional cardiophrenic lymph nodes versus pericardial nodules.     -9/6 Duplex on Sandra Acute deep venous thrombosis: above and below the knee. Acute deep venous thrombosis in the right popliteal and posterior tibial veins. Acute deep venous thrombosis extending from the left femoral vein through the popliteal and calf veins. Started heparin gtt after the admission then switched to eliquis.    -Lab on previous admission significant for Hb8.6 wbc 17.2 plt 180 MCV 85 Cr 4.3; ALT 41 AST 99; ; Phos Mg 2.9 Uric acid 22. before d/c MESHA improved with Cr 1.77 and uric acid wnl Hb stable 8.3.     -s/p EGD/flex sig 9/11/2023 LA Grade B esophagitis, non-obstructing Schatzki ring, small hiatal hernia, normal examined duodenum, likely malignant partially obstructing tumor in the recto-sigmoid colon. Biopsied rectosigmoidal mass pathology confirmed as moderately differentiated adenocarcinoma.     -9/12 MRI abd Moderate ascites. Hepatic and peritoneal metastatic disease. Mass in the body of the pancreas measuring 2.4 cm suggest pancreatic adenocarcinoma.   -Onc team requested primary team to consult IR for considering pancreatic mass biopsy based on the abnormal MRI finding. However pt decided to f/u as outpt biopsy then d/c on 9/14/23.     -Onc team sent eamil to Cancer care direct team at [facility] for establishing care on 9/15/23.     -Pt readmitted to [facility] on 9/18/23 due to watery diarrhea, worsening Sandra edema. Lab found leukocytosis wbc 24.8k;  MESHA with Cr 3.03 on 9/19 morning lab. TLS including LDH/Phos/Mg/K increased with uric acid level 5.9 (pt is on allopurinol 100mg daily). G-6-PD level not decreased on 9/6/23. Currently pending GI PCR (c.diff and GI PCR negative on 9/6/23).       #Rectosigmoidal adenocarcinoma   Recommendations   -Renal consult if necessary in view of MESHA likely pre-renal with BUN/Cr >20 in the setting of watery diarrhea   -daily CBC, TLS lab including LDH/Uric acid/Phos/Mg/K, CMP   -continue with allopurinol, IVF, transfuse PRBC if Hb<7, active type and screen   -check fibrinogen and D-dimer, PT/PTT; continue with eliquis in view of recent DVT; closely monitor Hb to r/o GI bleeding   -Please discuss with patient about the potential IR biopsy of Pancreatic mass; if pt still wants to be performed after discharged then f/u with IR as outpt.   --no plan for inpt chemo for now. will resend email to [facility] Cancer care direct team once pt is ready to be d/c.   -GI consult if necessary for watery diarrhea.   -The rest of care per primary team       Please contact with onc team if having more questions   Marin Villa PGY6   Hem & Onc fellow [phone] or TEAM

## 2023-09-20 NOTE — PROGRESS NOTE ADULT - PROBLEM SELECTOR PLAN 7
Patient with elevated LFT in the setting of liver mets   -Worsening compared to prior admission   -Denies any abdominal pain, jaundice, or fever   -US abdomen as above   -Monitor LFTs Patient with elevated LFT in the setting of liver mets   -Worsening compared to prior admission   -Denies any abdominal pain, jaundice, or fever   -US abdomen noted   -Monitor LFTs

## 2023-09-20 NOTE — CHART NOTE - NSCHARTNOTEFT_GEN_A_CORE
ACP arrived in patient room to follow up with RN and noticed that patient with agonal breathing. RN running vitals machine, BP circulating. Per RN patient was in conversation with her and progressively became drowsy as she was attempting to draw blood, patient had then started to breath shallow just prior to ACP walking into room. Approximately 45 minutes prior to this, patient was AOx4 and conversing with ACP. Decision to call RRT was made. ACP attempted to assess patient but patient was not responsive to sternal rub, breathing became more shallow until it appeared to stop. Unable to detect pulse via palpation so compressions initiated and code blue called. Shortly after, richard heard from patient and RRT team arrived, pulse checked and full assessment performed, see RRT note. Rhythm at time of apnea and initiation of compressions unknown.     Discussed initial events with patient's  Praveen. Dr. Elias updated.     Angie Ayala PA-C  Department of Internal Medicine  pager [phone], available on [employer] TEAMS ACP arrived in patient room to follow up with RN and noticed that patient with agonal breathing. RN running vitals machine, BP circulating. Per RN patient was in conversation with her and progressively became drowsy as she was attempting to draw blood, patient had then started to breath shallow just prior to ACP walking into room. Approximately 45 minutes prior to this, patient was AOx4 and conversing with ACP. Decision to call RRT was made. ACP attempted to assess patient but patient was not responsive to sternal rub, breathing became more shallow until it appeared to stop. Unable to detect pulse via palpation so compressions initiated and code blue called. Shortly after, richard heard from patient and RRT team arrived, pulse checked and full assessment performed, see RRT note. Patient was intubated and sent to MICU.     Discussed initial events with patient's  Praveen. Dr. Elias updated.     Angie Ayala PA-C  Department of Internal Medicine  pager [phone], available on [employer] TEAMS ACP arrived in patient room to follow up with RN and noticed that patient with agonal breathing. RN running vitals machine, BP circulating. Per RN patient was in conversation with her and progressively became drowsy as she was attempting to draw blood, patient had then started to breath shallow just prior to ACP walking into room. Approximately 45 minutes prior to this, patient was AOx4 and conversing with ACP. Decision to call RRT was made. ACP attempted to assess patient but patient was not responsive to sternal rub, breathing became more shallow until it appeared to stop. Unable to detect pulse via palpation so compressions initiated and code blue called. Rhythm unknown. Shortly after, richard heard from patient and RRT team arrived, pulse checked and full assessment performed, see RRT/MICU note. Patient was intubated and sent to MICU.     Discussed initial events with patient's  Praveen. Dr. Elias updated.     Angie Ayala PA-C  Department of Internal Medicine  pager [phone], available on [employer] TEAMS ACP arrived in patient room to follow up with RN and noticed that patient with agonal breathing. RN running vitals machine, BP circulating, unable to obtain vitals. Per RN patient was in conversation with her and progressively became drowsy as she was attempting to draw blood, patient had then started to breath shallow just prior to ACP walking into room. Approximately 45 minutes prior to this, patient was AOx4 and conversing with ACP. Decision to call RRT was made. ACP attempted to assess patient but patient was not responsive to sternal rub, breathing became more shallow until it appeared to stop. Unable to detect pulse via palpation so compressions initiated and code blue called. Rhythm unknown. Shortly after, richard heard from patient and RRT team arrived, pulse checked and full assessment performed, see RRT/MICU note. Patient was intubated and sent to MICU.     Discussed initial events with patient's  Praveen. Dr. Elias updated.     Angie Ayala PA-C  Department of Internal Medicine  pager [phone], available on [employer] TEAMS

## 2023-09-20 NOTE — PROCEDURE NOTE - NSPROCDETAILS_GEN_ALL_CORE
patient pre-oxygenated, tube inserted, placement confirmed
guidewire recovered/lumen(s) aspirated and flushed/sterile dressing applied/sterile technique, catheter placed/ultrasound guidance with use of sterile gel and probe cove
location identified, draped/prepped, sterile technique used/lumen(s) aspirated and flushed/sterile dressing applied
location identified, draped/prepped, sterile technique used/lumen(s) aspirated and flushed/sterile dressing applied
location identified, draped/prepped, sterile technique used, needle inserted/introduced/positive blood return obtained via catheter/connected to a pressurized flush line/sutured in place/hemostasis with direct pressure, dressing applied/Seldinger technique/all materials/supplies accounted for at end of procedure
guidewire recovered/lumen(s) aspirated and flushed/sterile dressing applied/sterile technique, catheter placed/ultrasound guidance with use of sterile gel and probe cove
guidewire recovered/lumen(s) aspirated and flushed/sterile dressing applied/sterile technique, catheter placed/ultrasound guidance with use of sterile gel and probe cove

## 2023-09-20 NOTE — CHART NOTE - NSCHARTNOTEFT_GEN_A_CORE
: Dae Hyeon Kim    INDICATION: Shock    PROCEDURE:  [x ] LIMITED ECHO  [ ] LIMITED CHEST  [ ] LIMITED RETROPERITONEAL  [ ] LIMITED ABDOMINAL  [ ] LIMITED DVT  [ ] NEEDLE GUIDANCE VASCULAR  [ ] NEEDLE GUIDANCE THORACENTESIS  [ ] NEEDLE GUIDANCE PARACENTESIS  [ ] NEEDLE GUIDANCE PERICARDIOCENTESIS  [ ] OTHER    FINDINGS:  Hyperdynamic LVSF  Enlarged RV  Within RV with a large mobile hyperechoic mass consistent with possible thrombus  Severe RV dysfunction with possible Corrales's sign  IVC is fully collapsed however maybe in setting of diffuse liver mets.       INTERPRETATION:  Concerning for cor pulmonale 2/2 to likely PE.     Images uploaded on [employer]

## 2023-09-20 NOTE — PROCEDURE NOTE - PROCEDURE DATE TIME, MLM
20-Sep-2023 21:55
20-Sep-2023 17:15
20-Sep-2023 18:31
20-Sep-2023 15:20
20-Sep-2023 20:50
20-Sep-2023 21:53
20-Sep-2023 18:29

## 2023-09-20 NOTE — PROCEDURE NOTE - NSIOCONFIRMED_VASC_A_CORE
aspiration of blood/marrow mixture without difficulty/flushing with fluid/needle stands without support
aspiration of blood/marrow mixture without difficulty/flushing with fluid/needle stands without support

## 2023-09-20 NOTE — PROGRESS NOTE ADULT - PROBLEM SELECTOR PLAN 8
Patient with recent hx of TLS   -Now with elevated potasium, LDH. Urine acid is 5.7   -C/w allopurinol  -Trend TLS labs   -F/u with Heme/onc recs
Patient with recent hx of TLS   -Now with elevated potasium, LDH. Urine acid is 5.7   -C/w allopurinol  -Trend TLS labs   -F/u with Heme/onc recs

## 2023-09-20 NOTE — CONSULT NOTE ADULT - ASSESSMENT
59 yo F with Pmhx of HTN and unknown primary metastatic cancer and DVT on eliquis, initially admitted for bloody diarrhea (eliquis held) and metastatic cancer workup, with acute decompensation likely in the setting of acute PE (eliquis held, b/l DVTs, clot in transit). Case discussed with Dr. Bridgette Byrd, patient is a poor candidate for catheter directed therapies at this time given metastatic cancer as well as bilateral DVTs with limited catheter access sites.     Plan:  -continue heparin gtt  -recommend systemic tPA if no contraindication, agree with murray CT to evaluate for intracranial hemorrhage and venous access sites  -if patient develops cardiac arrest, recommend systemic tPA as part of ACLS protocol 57 yo F with Pmhx of HTN and unknown primary metastatic cancer and DVT on eliquis, initially admitted for bloody diarrhea (eliquis held) and metastatic cancer workup, with acute decompensation likely in the setting of acute PE (eliquis held, b/l DVTs, clot in transit). Case discussed with Dr. Bridgette Byrd, patient is a poor candidate for catheter directed therapies at this time given metastatic cancer as well as bilateral DVTs with limited catheter access sites.     Plan:  -no plan for catheter directed therapies at this time  -consider CT surgery evaluation, although unlikely to be a candidate for surgical embolectomy  -continue heparin gtt  -recommend systemic tPA if no contraindication, agree with murray CT to evaluate for intracranial hemorrhage and venous access sites  -if patient develops cardiac arrest, recommend systemic tPA as part of ACLS protocol  -remainder of care per MICU team

## 2023-09-20 NOTE — PROGRESS NOTE ADULT - PROBLEM SELECTOR PLAN 1
Patient with tachycardia, leukocytosis, lactic acidosis, MESHA consistent with severe sepsis   -Reports to have acute on chronic diarrhea , diarrhea now resolved, per patient   -Will send bcx, UA, GI PCR   -Most likely due to worsening malignancy   -Monitor off abx for now   -s/p IV  cc in the ED   -Repeat lactic prior to fluids is at 2.8->3. F/u with repeat lactate , ordered stat   -if febrile, can start broad IV abx Patient with tachycardia, leukocytosis, lactic acidosis, MESHA consistent with severe sepsis   -Reports to have acute on chronic diarrhea , diarrhea now resolved, per patient   -bcx-NGTD   -, UA dirty , will check ucx , and start ctx   - GI PCR could not be sent as pt did not have diarrhea   -Most likely due to worsening malignancy   -s/p IV  cc in the ED   -Repeat lactic prior to fluids is at 2.8->3,  repeat lactate 2.2  ,  Pt also noted to have soft BP and mild tachycardia, pt denies dizziness or palpitation, will CTM

## 2023-09-20 NOTE — CONSULT NOTE ADULT - SUBJECTIVE AND OBJECTIVE BOX
[facility] DIVISION OF KIDNEY DISEASES AND HYPERTENSION -- [phone]  -- INITIAL CONSULT NOTE  --------------------------------------------------------------------------------    HPI: 59 yo F with h/o metastatic cancer of unknown primary etiology, HTN, and DVT initially presented with bloody diarrhea was admitted on  for management of sepsis. Hospital course complicated by acute renal failure with hyperkalemia and cardiac arrest on . On review of [place], Scr was 0.97 on 23. Scr increased to 4.70 on recent hospital admission (), and decreased to 1.77 on hospital discharge (). Scr initially during current admission was elevated at 2.02 (), and subsequently trended up to 4.11 today.    Pt. was seen and evaluated in the MICU today. Pt. is intubated, sedated, and requiring IV vasopressor support. Pt. unable to provide history or ROS.       PAST HISTORY  --------------------------------------------------------------------------------  PAST MEDICAL & SURGICAL HISTORY:  HTN (hypertension)  Malignancy  MESHA (acute kidney injury)  Tumor lysis syndrome  History of       FAMILY HISTORY:  No pertinent family history in first degree relatives      PAST SOCIAL HISTORY:    ALLERGIES & MEDICATIONS  --------------------------------------------------------------------------------  Allergies    No Known Allergies    Intolerances      Standing Inpatient Medications  allopurinol 100 milliGRAM(s) Oral daily  chlorhexidine 0.12% Liquid 15 milliLiter(s) Oral Mucosa every 12 hours  chlorhexidine 2% Cloths 1 Application(s) Topical daily  CRRT Treatment    <Continuous>  heparin  Infusion.  Unit(s)/Hr IV Continuous <Continuous>  norepinephrine Infusion 0.05 MICROgram(s)/kG/Min IV Continuous <Continuous>  petrolatum Ophthalmic Ointment 1 Application(s) Both EYES daily  phenylephrine    Infusion 1 MICROgram(s)/kG/Min IV Continuous <Continuous>  PrismaSATE Dialysate BK 0 / 3.5 5000 milliLiter(s) CRRT <Continuous>  PrismaSOL Filtration BGK 0 / 2.5 5000 milliLiter(s) CRRT <Continuous>  PrismaSOL Filtration BGK 0 / 2.5 5000 milliLiter(s) CRRT <Continuous>  propofol Infusion 30 MICROgram(s)/kG/Min IV Continuous <Continuous>  sodium bicarbonate  Infusion 0.167 mEq/kG/Hr IV Continuous <Continuous>  sodium bicarbonate  Injectable 50 milliEquivalent(s) IV Push once  vasopressin Infusion 0.04 Unit(s)/Min IV Continuous <Continuous>    PRN Inpatient Medications  heparin   Injectable 7000 Unit(s) IV Push every 6 hours PRN  heparin   Injectable 3500 Unit(s) IV Push every 6 hours PRN      REVIEW OF SYSTEMS  --------------------------------------------------------------------------------  Unable to obtain ROS as pt. is intubated    VITALS/PHYSICAL EXAM  --------------------------------------------------------------------------------  T(C): 36.7 (23 @ 16:20), Max: 36.7 (23 @ 16:20)  HR: 120 (23 @ 18:19) (94 - 132)  BP: 124/73 (23 @ 16:25) (90/42 - 132/114)  RR: 28 (23 @ 18:19) (17 - 28)  SpO2: 98% (23 @ 18:19) (97% - 100%)  Wt(kg): --    Weight (kg): 90 (23 @ 16:48)    Physical Exam:  Gen: ill appearing  HEENT: +ET tube  Pulm: Mechanical breath sounds BL  CV: S1S2  Abd: Soft, +BS   Ext: +BL LE edema  Neuro: Sedated  Skin: Warm and dry    LABS/STUDIES  --------------------------------------------------------------------------------              7.1    32.61 >-----------<  168      [23 @ 16:20]              23.6     143  |  108  |  97  ----------------------------<  107      [23 @ 16:20]  6.1   |  13  |  4.11        Ca     9.3     [23 16:20]      Mg     2.80     [23 16:20]      Phos  8.9     [23 16:20]    TPro  5.9  /  Alb  2.4  /  TBili  0.9  /  DBili  x   /  AST  252  /  ALT  75  /  AlkPhos  833  [23 @ 16:20]    PTT: 29.1       [23 @ 16:20]    Uric acid 5.9      [23 @ 08:06]        [23 08:06]    Creatinine Trend:  SCr 4.11 [ 16:20]  SCr 3.63 [ 06:47]  SCr 3.03 [ 13:05]  SCr 2.72 [ 08:06]  SCr 2.38 [09-18 @ 20:38]    Lipid: chol 136, , HDL 26, LDL --      [23 @ 08:06] [facility] DIVISION OF KIDNEY DISEASES AND HYPERTENSION -- [phone]  -- INITIAL CONSULT NOTE  --------------------------------------------------------------------------------    HPI: 58-year-old female with metastatic cancer of unknown primary etiology, HTN, and DVT initially presented with bloody diarrhea on 23, admitted for management of sepsis. Hospital course complicated by MESHA, hyperkalemia and metabolic acidosis. Pt. had cardiac arrest earlier today (23), transferred to MICU. Nephrology consulted for MESHA. On review of [place], Scr was 0.97 on 23. Scr increased to 4.70 on recent hospital admission (), and decreased to 1.77 on hospital discharge (). Scr initially during current admission was elevated at 2.02 (), and subsequently trended up to 4.11 today.    Pt. seen and evaluated in the MICU earlier today. Pt. intubated and sedated. BP being maintained on IV vasopressor support.      PAST HISTORY  --------------------------------------------------------------------------------  PAST MEDICAL & SURGICAL HISTORY:  HTN (hypertension)  Malignancy  MESHA (acute kidney injury)  Tumor lysis syndrome  History of     FAMILY HISTORY:  No pertinent family history in first degree relatives    PAST SOCIAL HISTORY:    ALLERGIES & MEDICATIONS  --------------------------------------------------------------------------------  Allergies    No Known Allergies    Intolerances    Standing Inpatient Medications  allopurinol 100 milliGRAM(s) Oral daily  chlorhexidine 0.12% Liquid 15 milliLiter(s) Oral Mucosa every 12 hours  chlorhexidine 2% Cloths 1 Application(s) Topical daily  CRRT Treatment    <Continuous>  heparin  Infusion.  Unit(s)/Hr IV Continuous <Continuous>  norepinephrine Infusion 0.05 MICROgram(s)/kG/Min IV Continuous <Continuous>  petrolatum Ophthalmic Ointment 1 Application(s) Both EYES daily  phenylephrine    Infusion 1 MICROgram(s)/kG/Min IV Continuous <Continuous>  PrismaSATE Dialysate BK 0 / 3.5 5000 milliLiter(s) CRRT <Continuous>  PrismaSOL Filtration BGK 0 / 2.5 5000 milliLiter(s) CRRT <Continuous>  PrismaSOL Filtration BGK 0 / 2.5 5000 milliLiter(s) CRRT <Continuous>  propofol Infusion 30 MICROgram(s)/kG/Min IV Continuous <Continuous>  sodium bicarbonate  Infusion 0.167 mEq/kG/Hr IV Continuous <Continuous>  sodium bicarbonate  Injectable 50 milliEquivalent(s) IV Push once  vasopressin Infusion 0.04 Unit(s)/Min IV Continuous <Continuous>    PRN Inpatient Medications  heparin   Injectable 7000 Unit(s) IV Push every 6 hours PRN  heparin   Injectable 3500 Unit(s) IV Push every 6 hours PRN    REVIEW OF SYSTEMS  --------------------------------------------------------------------------------  Unable to obtain ROS (pt. intubated)    VITALS/PHYSICAL EXAM  --------------------------------------------------------------------------------  T(C): 36.7 (23 @ 16:20), Max: 36.7 (23 @ 16:20)  HR: 120 (23 @ 18:19) (94 - 132)  BP: 124/73 (23 @ 16:25) (90/42 - 132/114)  RR: 28 (23 @ 18:19) (17 - 28)  SpO2: 98% (23 @ 18:19) (97% - 100%)  Wt(kg): --    Weight (kg): 90 (23 @ 16:48)    Physical Exam:  Gen: ill appearing  HEENT: +ET tube  Pulm: Mechanical breath sounds B/L  CV: S1S2+  Abd: Soft, +BS   Ext: +B/L LE edema  Neuro: Sedated  Skin: Dry    LABS/STUDIES  --------------------------------------------------------------------------------              7.1    32.61 >-----------<  168      [23 @ 16:20]              23.6     143  |  108  |  97  ----------------------------<  107      [23 @ 16:20]  6.1   |  13  |  4.11        Ca     9.3     [23 @ 16:20]      Mg     2.80     [23 16:20]      Phos  8.9     [23 16:20]    TPro  5.9  /  Alb  2.4  /  TBili  0.9  /  DBili  x   /  AST  252  /  ALT  75  /  AlkPhos  833  [23 16:20]    Uric acid 5.9      [23 @ 08:06]        [23 08:06]    Creatinine Trend:  SCr 4.11 [ 16:20]  SCr 3.63 [ 06:47]  SCr 3.03 [ 13:05]  SCr 2.72 [ 08:06]  SCr 2.38 [09-18 @ 20:38]

## 2023-09-20 NOTE — CONSULT NOTE ADULT - PROBLEM SELECTOR RECOMMENDATION 9
Pt. with MESHA in the setting of sepsis, hypotension, and cardiac arrest. On review of [place], Scr was 0.97 on 7/26/23. Scr increased to 4.70 on recent hospital admission (9/6), and decreased to 1.77 on hospital discharge (9/14). Scr initially during current admission was elevated at 2.02 (9/18), and subsequently trended up to 4.11 today. UOP is not documented. UA shows proteinuria, ketonuria, and evidence of infection. Pt. currently hemodynamically unstable, requiring IV vasopressor support. Labs reviewed. Pt. with likely ATN. Plan for initiation of CRRT given worsening kidney function and hyperkalemia. CRRT consent was obtained from pt's spouse over the phone and placed in chart. Obtain renal US. Monitor labs and urine output. Avoid nephrotoxins. Dose medications to CRRT. Pt. with MESHA in the setting of sepsis, hypotension, and cardiac arrest. On review of [place], Scr was 0.97 on 7/26/23. Scr increased to 4.70 on recent hospital admission on 9/6/23, decreased to 1.77 on hospital discharge (9/14). Scr initially during current admission was elevated at 2.02 (9/18), and subsequently trended up to 4.11 today. UOP is not documented. UA shows proteinuria, ketonuria, and evidence of infection. Pt. currently hemodynamically unstable, requiring IV vasopressor support. Labs reviewed. Pt. with likely ATN. Plan for initiation of CRRT given worsening MESHA, hyperkalemia and metabolic acidosis. CRRT consent was obtained from pt's spouse over the phone and placed in chart. Obtain renal US. Monitor labs and urine output. Avoid nephrotoxins. Dose medications to CRRT.

## 2023-09-20 NOTE — PROCEDURE NOTE - NSINDICATIONS_GEN_A_CORE
code blue/critical illness/emergency venous access
respiratory distress
arterial puncture to obtain ABG's/critical patient/monitoring purposes
shock
emergency venous access
dialysis/CRRT
shock

## 2023-09-20 NOTE — PROVIDER CONTACT NOTE (CHANGE IN STATUS NOTIFICATION) - BACKGROUND
primary RN on lunch break. This RN went to pt bedside to draw blood as ordered. pt alert and oriented and talking to me and . As I attempted blood draw pt became increasingly lethargic. obtained VS machine and as I was taking a BP CRISTOPHER Ayala entered the room.

## 2023-09-20 NOTE — PROGRESS NOTE ADULT - PROBLEM SELECTOR PLAN 3
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 IR was consulted for pancreatic mass biopsy but pt deferred  -Biopsy results showed adenocarcinoma-unknown primary   -Heme/onc were consulted by the ED, f/u with recs   -Surgery was consulted during last admission, pt not candidate for surgery given metastasis  - US of abdomen shows  mets in  liver Patient with pancreatic and colonic mass with mets to the lungs   -S/p biopsy of colonic mass. IR was consulted for pancreatic mass biopsy but pt deferred, Discussed with patient again  , if she is interested in getting IR guided pancreatic bx as inpatient , but pt says she prefers to get it done as out patient   -Biopsy results showed adenocarcinoma-unknown primary   -Heme/onc were consulted by the ED, f/u with recs   -Surgery was consulted during last admission, pt not candidate for surgery given metastasis  - US of abdomen shows  mets in  liver

## 2023-09-20 NOTE — PROGRESS NOTE ADULT - NSPROGADDITIONALINFOA_GEN_ALL_CORE
Attending Addendum : Informed by ACP at 3:10 Pm that pt was noted to have agonal breathing  and progressively became drowsy as she was having a conversation with  the RN while the RN was attempting to draw blood, . RRT and Code Blue called . Pt was successfully resuscitated , intubated and transferred to MICU for further management

## 2023-09-20 NOTE — PROGRESS NOTE ADULT - PROBLEM SELECTOR PLAN 6
Patient with hyperkalemia in the setting of MESHA   -S/p calcium gluconate, insulin/D50, and lokelma   -EKG shows no peaked T wave   -repeat BMP showed K of 5.7 from 6.9   -Will give additional lokelma 10 g x1   -F/u with repeat BMP Patient with hyperkalemia in the setting of MESHA   -S/p calcium gluconate, insulin/D50, and lokelma   -EKG shows no peaked T wave   -repeat BMP showed K of 5.7 from 6.9   - s/p  additional lokelma 10 g x1 on 9/19   -K 6.3 on VBG , will treat with d50, insulin and Lokelma

## 2023-09-20 NOTE — PROGRESS NOTE ADULT - PROBLEM SELECTOR PLAN 4
Patient reports to have diarrhea and some blood mixed with the stool   -Hgb  8.3-->7.7 . pt reports no blood in stool, slight downtrend ,=may be dilutional to IVF   -Most likely due to known colonic mass   -Holding  eliquis , will resume as pt with B/L DVT ,  -will stop Eliquis and  Consider GI consult if hgb is downtrending   -trend CBC daily for now   -Active type and screen   -Transfuse if Hgb <7 Patient reports to have diarrhea and some blood mixed with the stool   -Hgb  8.3-->7.7 . pt reports no blood in stool, slight downtrend ,=may be dilutional to IVF   -Most likely due to known colonic mass    Eliquis was held on admission , resumed  as pt with B/L DVT ,  -will stop Eliquis and  Consider GI consult if hgb is downtrending   -trend CBC daily for now   -Active type and screen   -Transfuse if Hgb <7

## 2023-09-20 NOTE — PROGRESS NOTE ADULT - PROBLEM SELECTOR PLAN 9
DVT ppx holding eliquis due to recent report of GI bleeding, will resume as benefit outweighs risk with B/L DVT in setting of malignancy  Prognosis guarded   plan of care d/w pt and ACP

## 2023-09-20 NOTE — CONSULT NOTE ADULT - PROBLEM SELECTOR RECOMMENDATION 2
Pt. with hyperkalemia in the setting of renal failure. Serum potassium is elevated at 6.1. Pt. received medical management. Plan for initiation of CRRT, as outlined above. Monitor serum potassium.    If you have any questions, please feel free to contact me  Je Goodman  Nephrology Fellow  [phone] / [employer] Teams(Preferred)  (After 5pm or on weekends please page the on-call fellow) Pt. with hyperkalemia in the setting of MESHA and metabolic acidosis. Last serum potassium elevated at 6.1. Pt. received medical management. Plan for initiation of CRRT, as outlined above. Monitor serum potassium.    Overall prognosis guarded.    If you have any questions, please feel free to contact me  Je Goodman  Nephrology Fellow  [phone] / [employer] Teams(Preferred)  (After 5pm or on weekends please page the on-call fellow)

## 2023-09-20 NOTE — CHART NOTE - NSCHARTNOTEFT_GEN_A_CORE
MICU Accept Note    CHIEF COMPLAINT:     HPI:  59 yo F with Pmhx of HTN and unknown primary metastatic cancer and DVT on eliquis presents to the ED with bloody diarrhea. Patient reports that she was discharged from the hospital on . Since discharge she has been stable w/o any diarrhea until yesterday. She has been having watery, diarrhea multiple times per day since yesterday. It is brown in color and it sometimes has some streaks of blood mixed with it. She denies any melena or hematochezia. She has nausea and has been vomiting up food materials. She also reports to have worsening b/l LE edema which got worse over the last couple of days. She has been drinking 2-3 500cc bottle of water every day but her PO intake has also decreased. Of note. she was recently diagnosed with metastatic cancer-unknown primary. She underwent EGD and colonoscopy, pending path results. Her hospitalization was complicated by TLS and MESHA which improved prior to discharge   In the ED, her vitals were notable for tachycardia. Labs were notable for chronic but stable anemia, leukocytosis, elevated INR, PT, elevated LDH, hyperkalemia, low bicarb, elevated AG, elevated BUN/Scr, elevated ALP, AST/ALT, pH of 7.27->7.30, lactic acidosis. EKG showed sinus tachycardia.  (18 Sep 2023 17:19)      INTERVAL HISTORY:    PAST MEDICAL & SURGICAL HISTORY:  HTN (hypertension)      Malignancy      MESHA (acute kidney injury)      Tumor lysis syndrome      History of           FAMILY HISTORY:  No pertinent family history in first degree relatives        Social History:      HOME MEDICATIONS:  Home Medications:  pantoprazole 40 mg oral delayed release tablet: 1 tab(s) orally once a day (18 Sep 2023 17:27)  sertraline 25 mg oral tablet: 1 tab(s) orally once a day (18 Sep 2023 17:27)  traZODone 50 mg oral tablet: 1 tab(s) orally once a day (at bedtime) (18 Sep 2023 17:27)      Allergies    No Known Allergies    Intolerances        REVIEW OF SYSTEMS:  Constitutional: No fevers, chills, weight loss, weight gain  HEENT: No vision problems, eye pain, nasal congestion, rhinorrhea, sore throat, dysphagia  CV: No chest pain, orthopnea, palpitations  Resp: No cough, dyspnea, wheezing, hemoptysis  GI: No nausea, vomiting, diarrhea, constipation, abdominal pain  : [ ] dysuria [ ] nocturia [ ] hematuria [ ] increased urinary frequency  Musculoskeletal: [ ] back pain [ ] myalgias [ ] arthralgias [ ] fracture  Skin: [ ] rash [ ] itch  Neurological: [ ] headache [ ] dizziness [ ] syncope [ ] weakness [ ] numbness  Psychiatric: [ ] anxiety [ ] depression  Endocrine: [ ] diabetes [ ] thyroid problem  Hematologic/Lymphatic: [ ] anemia [ ] bleeding problem  Allergic/Immunologic: [ ] itchy eyes [ ] nasal discharge [ ] hives [ ] angioedema  [ ] All other systems negative  [ ] Unable to assess ROS because ________    OBJECTIVE:  ICU Vital Signs Last 24 Hrs  T(C): 36.7 (20 Sep 2023 16:20), Max: 36.7 (20 Sep 2023 16:20)  T(F): 98 (20 Sep 2023 16:20), Max: 98 (20 Sep 2023 16:20)  HR: 114 (20 Sep 2023 16:25) (89 - 132)  BP: 124/73 (20 Sep 2023 16:25) (90/42 - 132/114)  BP(mean): 89 (20 Sep 2023 16:25) (82 - 122)  ABP: --  ABP(mean): --  RR: 22 (20 Sep 2023 16:25) (17 - 28)  SpO2: 100% (20 Sep 2023 16:25) (97% - 100%)    O2 Parameters below as of 20 Sep 2023 16:05  Patient On (Oxygen Delivery Method): ventilator    O2 Concentration (%): 100          CAPILLARY BLOOD GLUCOSE      POCT Blood Glucose.: 109 mg/dL (20 Sep 2023 15:07)      PHYSICAL EXAM:  General:   HEENT:   Neck:   Chest/Lungs:  Heart:  Abdomen:   Extremities:   Skin:   Neuro:   Psych:     LINES:     HOSPITAL MEDICATIONS:  MEDICATIONS  (STANDING):  allopurinol 100 milliGRAM(s) Oral daily  chlorhexidine 0.12% Liquid 15 milliLiter(s) Oral Mucosa every 12 hours  chlorhexidine 2% Cloths 1 Application(s) Topical daily  petrolatum Ophthalmic Ointment 1 Application(s) Both EYES daily  piperacillin/tazobactam IVPB. 3.375 Gram(s) IV Intermittent once    MEDICATIONS  (PRN):      LABS:                        7.6    24.05 )-----------( 142      ( 20 Sep 2023 06:47 )             25.3         QNS  |  QNS  |  96<H>  ----------------------------<  94  QNS   |  7<LL>  |  3.63<H>    Ca    8.1<L>      20 Sep 2023 06:47  Phos  5.1       Mg     2.70         TPro  6.2  /  Alb  2.3<L>  /  TBili  0.7  /  DBili  x   /  AST  162<H>  /  ALT  62<H>  /  AlkPhos  954<H>            Venous: 23 @ 11:32 FiO2: -- Oxygen Sat% 34.6    Urinalysis Basic - ( 20 Sep 2023 06:47 )    Color: x / Appearance: x / SG: x / pH: x  Gluc: 94 mg/dL / Ketone: x  / Bili: x / Urobili: x   Blood: x / Protein: x / Nitrite: x   Leuk Esterase: x / RBC: x / WBC x   Sq Epi: x / Non Sq Epi: x / Bacteria: x          CAPILLARY BLOOD GLUCOSE      POCT Blood Glucose.: 109 mg/dL (20 Sep 2023 15:07)  POCT Blood Glucose.: 102 mg/dL (20 Sep 2023 12:13)  POCT Blood Glucose.: 101 mg/dL (20 Sep 2023 08:41)      RADIOLOGY & ADDITIONAL TESTS:    ASSESSMENT/PLAN:      ===NEURO===    ===CARDIAC===    ===PULM===    ===RENAL===    ===GI===    ===HEME/ONC===    ===ID===    ===ENDO===    DVT MICU Accept Note    CHIEF COMPLAINT:     HPI:  59 yo F with Pmhx of HTN and unknown primary metastatic cancer and DVT on eliquis presents to the ED with bloody diarrhea. Patient reports that she was discharged from the hospital on . Since discharge she has been stable w/o any diarrhea until yesterday. She has been having watery, diarrhea multiple times per day since yesterday. It is brown in color and it sometimes has some streaks of blood mixed with it. She denies any melena or hematochezia. She has nausea and has been vomiting up food materials. She also reports to have worsening b/l LE edema which got worse over the last couple of days. She has been drinking 2-3 500cc bottle of water every day but her PO intake has also decreased. Of note. she was recently diagnosed with metastatic cancer-unknown primary. She underwent EGD and colonoscopy, pending path results. Her hospitalization was complicated by TLS and MESHA which improved prior to discharge     In the ED, her vitals were notable for tachycardia. Labs were notable for chronic but stable anemia, leukocytosis, elevated INR, PT, elevated LDH, hyperkalemia, low bicarb, elevated AG, elevated BUN/Scr, elevated ALP, AST/ALT, pH of 7.27->7.30, lactic acidosis. EKG showed sinus tachycardia.     INTERVAL HISTORY:      PAST MEDICAL & SURGICAL HISTORY:  HTN (hypertension)      Malignancy      MESHA (acute kidney injury)      Tumor lysis syndrome      History of           FAMILY HISTORY:  No pertinent family history in first degree relatives        Social History:      HOME MEDICATIONS:  Home Medications:  pantoprazole 40 mg oral delayed release tablet: 1 tab(s) orally once a day (18 Sep 2023 17:27)  sertraline 25 mg oral tablet: 1 tab(s) orally once a day (18 Sep 2023 17:27)  traZODone 50 mg oral tablet: 1 tab(s) orally once a day (at bedtime) (18 Sep 2023 17:27)      Allergies    No Known Allergies    Intolerances        REVIEW OF SYSTEMS:  Constitutional: No fevers, chills, weight loss, weight gain  HEENT: No vision problems, eye pain, nasal congestion, rhinorrhea, sore throat, dysphagia  CV: No chest pain, orthopnea, palpitations  Resp: No cough, dyspnea, wheezing, hemoptysis  GI: No nausea, vomiting, diarrhea, constipation, abdominal pain  : [ ] dysuria [ ] nocturia [ ] hematuria [ ] increased urinary frequency  Musculoskeletal: [ ] back pain [ ] myalgias [ ] arthralgias [ ] fracture  Skin: [ ] rash [ ] itch  Neurological: [ ] headache [ ] dizziness [ ] syncope [ ] weakness [ ] numbness  Psychiatric: [ ] anxiety [ ] depression  Endocrine: [ ] diabetes [ ] thyroid problem  Hematologic/Lymphatic: [ ] anemia [ ] bleeding problem  Allergic/Immunologic: [ ] itchy eyes [ ] nasal discharge [ ] hives [ ] angioedema  [ ] All other systems negative  [ ] Unable to assess ROS because ________    OBJECTIVE:  ICU Vital Signs Last 24 Hrs  T(C): 36.7 (20 Sep 2023 16:20), Max: 36.7 (20 Sep 2023 16:20)  T(F): 98 (20 Sep 2023 16:20), Max: 98 (20 Sep 2023 16:20)  HR: 114 (20 Sep 2023 16:25) (89 - 132)  BP: 124/73 (20 Sep 2023 16:25) (90/42 - 132/114)  BP(mean): 89 (20 Sep 2023 16:25) (82 - 122)  ABP: --  ABP(mean): --  RR: 22 (20 Sep 2023 16:25) (17 - 28)  SpO2: 100% (20 Sep 2023 16:25) (97% - 100%)    O2 Parameters below as of 20 Sep 2023 16:05  Patient On (Oxygen Delivery Method): ventilator    O2 Concentration (%): 100          CAPILLARY BLOOD GLUCOSE      POCT Blood Glucose.: 109 mg/dL (20 Sep 2023 15:07)      PHYSICAL EXAM:  General:   HEENT:   Neck:   Chest/Lungs:  Heart:  Abdomen:   Extremities:   Skin:   Neuro:   Psych:     LINES:     HOSPITAL MEDICATIONS:  MEDICATIONS  (STANDING):  allopurinol 100 milliGRAM(s) Oral daily  chlorhexidine 0.12% Liquid 15 milliLiter(s) Oral Mucosa every 12 hours  chlorhexidine 2% Cloths 1 Application(s) Topical daily  petrolatum Ophthalmic Ointment 1 Application(s) Both EYES daily  piperacillin/tazobactam IVPB. 3.375 Gram(s) IV Intermittent once    MEDICATIONS  (PRN):      LABS:                        7.6    24.05 )-----------( 142      ( 20 Sep 2023 06:47 )             25.3         QNS  |  QNS  |  96<H>  ----------------------------<  94  QNS   |  7<LL>  |  3.63<H>    Ca    8.1<L>      20 Sep 2023 06:47  Phos  5.1       Mg     2.70         TPro  6.2  /  Alb  2.3<L>  /  TBili  0.7  /  DBili  x   /  AST  162<H>  /  ALT  62<H>  /  AlkPhos  954<H>            Venous: 23 @ 11:32 FiO2: -- Oxygen Sat% 34.6    Urinalysis Basic - ( 20 Sep 2023 06:47 )    Color: x / Appearance: x / SG: x / pH: x  Gluc: 94 mg/dL / Ketone: x  / Bili: x / Urobili: x   Blood: x / Protein: x / Nitrite: x   Leuk Esterase: x / RBC: x / WBC x   Sq Epi: x / Non Sq Epi: x / Bacteria: x          CAPILLARY BLOOD GLUCOSE      POCT Blood Glucose.: 109 mg/dL (20 Sep 2023 15:07)  POCT Blood Glucose.: 102 mg/dL (20 Sep 2023 12:13)  POCT Blood Glucose.: 101 mg/dL (20 Sep 2023 08:41)      RADIOLOGY & ADDITIONAL TESTS:    ASSESSMENT/PLAN:      ===NEURO===    ===CARDIAC===    ===PULM===    ===RENAL===    ===GI===    ===HEME/ONC===    ===ID===    ===ENDO===    DVT MICU Accept Note    CHIEF COMPLAINT:     HPI:  59 yo F with Pmhx of HTN and unknown primary metastatic cancer and DVT on eliquis presents to the ED with bloody diarrhea. Patient reports that she was discharged from the hospital on . Since discharge she has been stable w/o any diarrhea until yesterday. She has been having watery, diarrhea multiple times per day since yesterday. It is brown in color and it sometimes has some streaks of blood mixed with it. She denies any melena or hematochezia. She has nausea and has been vomiting up food materials. She also reports to have worsening b/l LE edema which got worse over the last couple of days. She has been drinking 2-3 500cc bottle of water every day but her PO intake has also decreased. Of note. she was recently diagnosed with metastatic cancer-unknown primary. She underwent EGD and colonoscopy, pending path results. Her hospitalization was complicated by TLS and MESHA which improved prior to discharge     In the ED, her vitals were notable for tachycardia. Labs were notable for chronic but stable anemia, leukocytosis, elevated INR, PT, elevated LDH, hyperkalemia, low bicarb, elevated AG, elevated BUN/Scr, elevated ALP, AST/ALT, pH of 7.27->7.30, lactic acidosis. EKG showed sinus tachycardia.     INTERVAL HISTORY:  Patient was admitted for management of severe sepsis undergoing infectious work-up, work-up of malignancy w/ pancreatic/colonic mass w/ mets to lungs, biopsy showing adenocarcinoma w/ unknown primary. Course complicated by anemia, MESHA, and hyperkalemia. Course complicated on  by code blue - patient became more drowsy, agonal breathing, previously AOx4, unknown rhythm at time of apnea and initiation of compressions. ROSC obtained and patient transferred to MICU for post-cardiac arrest care.     PAST MEDICAL & SURGICAL HISTORY:  HTN (hypertension)      Malignancy      MESHA (acute kidney injury)      Tumor lysis syndrome      History of           FAMILY HISTORY:  No pertinent family history in first degree relatives        Social History:      HOME MEDICATIONS:  Home Medications:  pantoprazole 40 mg oral delayed release tablet: 1 tab(s) orally once a day (18 Sep 2023 17:27)  sertraline 25 mg oral tablet: 1 tab(s) orally once a day (18 Sep 2023 17:27)  traZODone 50 mg oral tablet: 1 tab(s) orally once a day (at bedtime) (18 Sep 2023 17:27)      Allergies    No Known Allergies    Intolerances        REVIEW OF SYSTEMS:  Constitutional: No fevers, chills, weight loss, weight gain  HEENT: No vision problems, eye pain, nasal congestion, rhinorrhea, sore throat, dysphagia  CV: No chest pain, orthopnea, palpitations  Resp: No cough, dyspnea, wheezing, hemoptysis  GI: No nausea, vomiting, diarrhea, constipation, abdominal pain  : [ ] dysuria [ ] nocturia [ ] hematuria [ ] increased urinary frequency  Musculoskeletal: [ ] back pain [ ] myalgias [ ] arthralgias [ ] fracture  Skin: [ ] rash [ ] itch  Neurological: [ ] headache [ ] dizziness [ ] syncope [ ] weakness [ ] numbness  Psychiatric: [ ] anxiety [ ] depression  Endocrine: [ ] diabetes [ ] thyroid problem  Hematologic/Lymphatic: [ ] anemia [ ] bleeding problem  Allergic/Immunologic: [ ] itchy eyes [ ] nasal discharge [ ] hives [ ] angioedema  [ ] All other systems negative  [ ] Unable to assess ROS because ________    OBJECTIVE:  ICU Vital Signs Last 24 Hrs  T(C): 36.7 (20 Sep 2023 16:20), Max: 36.7 (20 Sep 2023 16:20)  T(F): 98 (20 Sep 2023 16:20), Max: 98 (20 Sep 2023 16:20)  HR: 114 (20 Sep 2023 16:25) (89 - 132)  BP: 124/73 (20 Sep 2023 16:25) (90/42 - 132/114)  BP(mean): 89 (20 Sep 2023 16:25) (82 - 122)  ABP: --  ABP(mean): --  RR: 22 (20 Sep 2023 16:25) (17 - 28)  SpO2: 100% (20 Sep 2023 16:25) (97% - 100%)    O2 Parameters below as of 20 Sep 2023 16:05  Patient On (Oxygen Delivery Method): ventilator    O2 Concentration (%): 100          CAPILLARY BLOOD GLUCOSE      POCT Blood Glucose.: 109 mg/dL (20 Sep 2023 15:07)      PHYSICAL EXAM:  General:   HEENT:   Neck:   Chest/Lungs:  Heart:  Abdomen:   Extremities:   Skin:   Neuro:   Psych:     LINES:     HOSPITAL MEDICATIONS:  MEDICATIONS  (STANDING):  allopurinol 100 milliGRAM(s) Oral daily  chlorhexidine 0.12% Liquid 15 milliLiter(s) Oral Mucosa every 12 hours  chlorhexidine 2% Cloths 1 Application(s) Topical daily  petrolatum Ophthalmic Ointment 1 Application(s) Both EYES daily  piperacillin/tazobactam IVPB. 3.375 Gram(s) IV Intermittent once    MEDICATIONS  (PRN):      LABS:                        7.6    24.05 )-----------( 142      ( 20 Sep 2023 06:47 )             25.3         QNS  |  QNS  |  96<H>  ----------------------------<  94  QNS   |  7<LL>  |  3.63<H>    Ca    8.1<L>      20 Sep 2023 06:47  Phos  5.1       Mg     2.70         TPro  6.2  /  Alb  2.3<L>  /  TBili  0.7  /  DBili  x   /  AST  162<H>  /  ALT  62<H>  /  AlkPhos  954<H>            Venous: 23 @ 11:32 FiO2: -- Oxygen Sat% 34.6    Urinalysis Basic - ( 20 Sep 2023 06:47 )    Color: x / Appearance: x / SG: x / pH: x  Gluc: 94 mg/dL / Ketone: x  / Bili: x / Urobili: x   Blood: x / Protein: x / Nitrite: x   Leuk Esterase: x / RBC: x / WBC x   Sq Epi: x / Non Sq Epi: x / Bacteria: x          CAPILLARY BLOOD GLUCOSE      POCT Blood Glucose.: 109 mg/dL (20 Sep 2023 15:07)  POCT Blood Glucose.: 102 mg/dL (20 Sep 2023 12:13)  POCT Blood Glucose.: 101 mg/dL (20 Sep 2023 08:41)      RADIOLOGY & ADDITIONAL TESTS:    ASSESSMENT/PLAN:  59 yo F with Pmhx of HTN and unknown primary metastatic cancer and DVT on eliquis presents to the ED with bloody diarrhea, with pancreatic/colonic mass w/ mets to lungs, prior biopsy showing adenocarcinoma w/ unknown primary, admitted for infectous work-up, malignancy work-up, MESHA, course c/b code blue on , ROSC obtained and patient admitted to MICU for post cardiac-arrest care.     ===NEURO===  -Patient intubated and sedated post-cardiac arrest  -Continue sedation for now    ===CARDIAC===  #Cardiac Arrest c/b clot-in-transit  - Patient with code blue on , unknown rhythm at the time.     ===PULM===    ===RENAL===    ===GI===    ===HEME/ONC===    ===ID===    ===ENDO===    DVT MICU Accept Note    CHIEF COMPLAINT:     HPI:  57 yo F with Pmhx of HTN and unknown primary metastatic cancer and DVT on eliquis presents to the ED with bloody diarrhea. Patient reports that she was discharged from the hospital on . Since discharge she has been stable w/o any diarrhea until yesterday. She has been having watery, diarrhea multiple times per day since yesterday. It is brown in color and it sometimes has some streaks of blood mixed with it. She denies any melena or hematochezia. She has nausea and has been vomiting up food materials. She also reports to have worsening b/l LE edema which got worse over the last couple of days. She has been drinking 2-3 500cc bottle of water every day but her PO intake has also decreased. Of note. she was recently diagnosed with metastatic cancer-unknown primary. She underwent EGD and colonoscopy, pending path results. Her hospitalization was complicated by TLS and MESHA which improved prior to discharge     In the ED, her vitals were notable for tachycardia. Labs were notable for chronic but stable anemia, leukocytosis, elevated INR, PT, elevated LDH, hyperkalemia, low bicarb, elevated AG, elevated BUN/Scr, elevated ALP, AST/ALT, pH of 7.27->7.30, lactic acidosis. EKG showed sinus tachycardia.     INTERVAL HISTORY:  Patient was admitted for management of severe sepsis undergoing infectious work-up, work-up of malignancy w/ pancreatic/colonic mass w/ mets to lungs, biopsy showing adenocarcinoma w/ unknown primary. Course complicated by anemia, MESHA, and hyperkalemia. Course complicated on  by code blue - patient became more drowsy, agonal breathing, previously AOx4, unknown rhythm at time of apnea and initiation of compressions. ROSC obtained and patient transferred to MICU for post-cardiac arrest care.     PAST MEDICAL & SURGICAL HISTORY:  HTN (hypertension)      Malignancy      MESHA (acute kidney injury)      Tumor lysis syndrome      History of           FAMILY HISTORY:  No pertinent family history in first degree relatives        Social History:      HOME MEDICATIONS:  Home Medications:  pantoprazole 40 mg oral delayed release tablet: 1 tab(s) orally once a day (18 Sep 2023 17:27)  sertraline 25 mg oral tablet: 1 tab(s) orally once a day (18 Sep 2023 17:27)  traZODone 50 mg oral tablet: 1 tab(s) orally once a day (at bedtime) (18 Sep 2023 17:27)      Allergies    No Known Allergies    Intolerances        REVIEW OF SYSTEMS:  Constitutional: No fevers, chills, weight loss, weight gain  HEENT: No vision problems, eye pain, nasal congestion, rhinorrhea, sore throat, dysphagia  CV: No chest pain, orthopnea, palpitations  Resp: No cough, dyspnea, wheezing, hemoptysis  GI: No nausea, vomiting, diarrhea, constipation, abdominal pain  : [ ] dysuria [ ] nocturia [ ] hematuria [ ] increased urinary frequency  Musculoskeletal: [ ] back pain [ ] myalgias [ ] arthralgias [ ] fracture  Skin: [ ] rash [ ] itch  Neurological: [ ] headache [ ] dizziness [ ] syncope [ ] weakness [ ] numbness  Psychiatric: [ ] anxiety [ ] depression  Endocrine: [ ] diabetes [ ] thyroid problem  Hematologic/Lymphatic: [ ] anemia [ ] bleeding problem  Allergic/Immunologic: [ ] itchy eyes [ ] nasal discharge [ ] hives [ ] angioedema  [ ] All other systems negative  [ ] Unable to assess ROS because ________    OBJECTIVE:  ICU Vital Signs Last 24 Hrs  T(C): 36.7 (20 Sep 2023 16:20), Max: 36.7 (20 Sep 2023 16:20)  T(F): 98 (20 Sep 2023 16:20), Max: 98 (20 Sep 2023 16:20)  HR: 114 (20 Sep 2023 16:25) (89 - 132)  BP: 124/73 (20 Sep 2023 16:25) (90/42 - 132/114)  BP(mean): 89 (20 Sep 2023 16:25) (82 - 122)  ABP: --  ABP(mean): --  RR: 22 (20 Sep 2023 16:25) (17 - 28)  SpO2: 100% (20 Sep 2023 16:25) (97% - 100%)    O2 Parameters below as of 20 Sep 2023 16:05  Patient On (Oxygen Delivery Method): ventilator    O2 Concentration (%): 100          CAPILLARY BLOOD GLUCOSE      POCT Blood Glucose.: 109 mg/dL (20 Sep 2023 15:07)      PHYSICAL EXAM:  General:   HEENT:   Neck:   Chest/Lungs:  Heart:  Abdomen:   Extremities:   Skin:   Neuro:   Psych:     LINES:     HOSPITAL MEDICATIONS:  MEDICATIONS  (STANDING):  allopurinol 100 milliGRAM(s) Oral daily  chlorhexidine 0.12% Liquid 15 milliLiter(s) Oral Mucosa every 12 hours  chlorhexidine 2% Cloths 1 Application(s) Topical daily  petrolatum Ophthalmic Ointment 1 Application(s) Both EYES daily  piperacillin/tazobactam IVPB. 3.375 Gram(s) IV Intermittent once    MEDICATIONS  (PRN):      LABS:                        7.6    24.05 )-----------( 142      ( 20 Sep 2023 06:47 )             25.3         QNS  |  QNS  |  96<H>  ----------------------------<  94  QNS   |  7<LL>  |  3.63<H>    Ca    8.1<L>      20 Sep 2023 06:47  Phos  5.1       Mg     2.70         TPro  6.2  /  Alb  2.3<L>  /  TBili  0.7  /  DBili  x   /  AST  162<H>  /  ALT  62<H>  /  AlkPhos  954<H>            Venous: 23 @ 11:32 FiO2: -- Oxygen Sat% 34.6    Urinalysis Basic - ( 20 Sep 2023 06:47 )    Color: x / Appearance: x / SG: x / pH: x  Gluc: 94 mg/dL / Ketone: x  / Bili: x / Urobili: x   Blood: x / Protein: x / Nitrite: x   Leuk Esterase: x / RBC: x / WBC x   Sq Epi: x / Non Sq Epi: x / Bacteria: x          CAPILLARY BLOOD GLUCOSE      POCT Blood Glucose.: 109 mg/dL (20 Sep 2023 15:07)  POCT Blood Glucose.: 102 mg/dL (20 Sep 2023 12:13)  POCT Blood Glucose.: 101 mg/dL (20 Sep 2023 08:41)      RADIOLOGY & ADDITIONAL TESTS:    ASSESSMENT/PLAN:  57 yo F with Pmhx of HTN and unknown primary metastatic cancer and DVT on eliquis presents to the ED with bloody diarrhea, with pancreatic/colonic mass w/ mets to lungs, prior biopsy showing adenocarcinoma w/ unknown primary, admitted for infectous work-up, malignancy work-up, MESHA, course c/b code blue on , ROSC obtained and patient admitted to MICU for post cardiac-arrest care.     ===NEURO===  -Patient intubated and sedated post-cardiac arrest  -Continue sedation for now    ===CARDIAC===  #Cardiac Arrest c/b clot-in-transit  - Patient with code blue on , unknown rhythm at the time.   - Intubated and sedated  - Course c/b clot-in-transit seen on POCUS  - f/u formal echocardiogram for cardiac function and evaluation of clot    #Shock  - Mixed septic cardiogenic in s/o cardiac arrest  - Management of septic shock w/ infectous w/u, antibiotics  - f/u TTE as above, maintain euvolemia    ===PULM===  -Intubated and Sedated  -Wean sedation as able    ===RENAL===    ===GI===    ===HEME/ONC===    ===ID===    ===ENDO===    DVT MICU Accept Note    CHIEF COMPLAINT:     HPI:  59 yo F with Pmhx of HTN and unknown primary metastatic cancer and DVT on eliquis presents to the ED with bloody diarrhea. Patient reports that she was discharged from the hospital on . Since discharge she has been stable w/o any diarrhea until yesterday. She has been having watery, diarrhea multiple times per day since yesterday. It is brown in color and it sometimes has some streaks of blood mixed with it. She denies any melena or hematochezia. She has nausea and has been vomiting up food materials. She also reports to have worsening b/l LE edema which got worse over the last couple of days. She has been drinking 2-3 500cc bottle of water every day but her PO intake has also decreased. Of note. she was recently diagnosed with metastatic cancer-unknown primary. She underwent EGD and colonoscopy, pending path results. Her hospitalization was complicated by TLS and MESHA which improved prior to discharge     In the ED, her vitals were notable for tachycardia. Labs were notable for chronic but stable anemia, leukocytosis, elevated INR, PT, elevated LDH, hyperkalemia, low bicarb, elevated AG, elevated BUN/Scr, elevated ALP, AST/ALT, pH of 7.27->7.30, lactic acidosis. EKG showed sinus tachycardia.     INTERVAL HISTORY:  Patient was admitted for management of severe sepsis undergoing infectious work-up, work-up of malignancy w/ pancreatic/colonic mass w/ mets to lungs, biopsy showing adenocarcinoma w/ unknown primary. Course complicated by anemia, MESHA, and hyperkalemia. Course complicated on  by code blue - patient became more drowsy, agonal breathing, previously AOx4, unknown rhythm at time of apnea and initiation of compressions. ROSC obtained and patient transferred to MICU for post-cardiac arrest care.     PAST MEDICAL & SURGICAL HISTORY:  HTN (hypertension)      Malignancy      MESHA (acute kidney injury)      Tumor lysis syndrome      History of           FAMILY HISTORY:  No pertinent family history in first degree relatives        Social History:      HOME MEDICATIONS:  Home Medications:  pantoprazole 40 mg oral delayed release tablet: 1 tab(s) orally once a day (18 Sep 2023 17:27)  sertraline 25 mg oral tablet: 1 tab(s) orally once a day (18 Sep 2023 17:27)  traZODone 50 mg oral tablet: 1 tab(s) orally once a day (at bedtime) (18 Sep 2023 17:27)      Allergies    No Known Allergies    Intolerances        REVIEW OF SYSTEMS:  Constitutional: No fevers, chills, weight loss, weight gain  HEENT: No vision problems, eye pain, nasal congestion, rhinorrhea, sore throat, dysphagia  CV: No chest pain, orthopnea, palpitations  Resp: No cough, dyspnea, wheezing, hemoptysis  GI: No nausea, vomiting, diarrhea, constipation, abdominal pain  : [ ] dysuria [ ] nocturia [ ] hematuria [ ] increased urinary frequency  Musculoskeletal: [ ] back pain [ ] myalgias [ ] arthralgias [ ] fracture  Skin: [ ] rash [ ] itch  Neurological: [ ] headache [ ] dizziness [ ] syncope [ ] weakness [ ] numbness  Psychiatric: [ ] anxiety [ ] depression  Endocrine: [ ] diabetes [ ] thyroid problem  Hematologic/Lymphatic: [ ] anemia [ ] bleeding problem  Allergic/Immunologic: [ ] itchy eyes [ ] nasal discharge [ ] hives [ ] angioedema  [ ] All other systems negative  [ ] Unable to assess ROS because ________    OBJECTIVE:  ICU Vital Signs Last 24 Hrs  T(C): 36.7 (20 Sep 2023 16:20), Max: 36.7 (20 Sep 2023 16:20)  T(F): 98 (20 Sep 2023 16:20), Max: 98 (20 Sep 2023 16:20)  HR: 114 (20 Sep 2023 16:25) (89 - 132)  BP: 124/73 (20 Sep 2023 16:25) (90/42 - 132/114)  BP(mean): 89 (20 Sep 2023 16:25) (82 - 122)  ABP: --  ABP(mean): --  RR: 22 (20 Sep 2023 16:25) (17 - 28)  SpO2: 100% (20 Sep 2023 16:25) (97% - 100%)    O2 Parameters below as of 20 Sep 2023 16:05  Patient On (Oxygen Delivery Method): ventilator    O2 Concentration (%): 100          CAPILLARY BLOOD GLUCOSE      POCT Blood Glucose.: 109 mg/dL (20 Sep 2023 15:07)      PHYSICAL EXAM:  General:   HEENT:   Neck:   Chest/Lungs:  Heart:  Abdomen:   Extremities:   Skin:   Neuro:   Psych:     LINES:     HOSPITAL MEDICATIONS:  MEDICATIONS  (STANDING):  allopurinol 100 milliGRAM(s) Oral daily  chlorhexidine 0.12% Liquid 15 milliLiter(s) Oral Mucosa every 12 hours  chlorhexidine 2% Cloths 1 Application(s) Topical daily  petrolatum Ophthalmic Ointment 1 Application(s) Both EYES daily  piperacillin/tazobactam IVPB. 3.375 Gram(s) IV Intermittent once    MEDICATIONS  (PRN):      LABS:                        7.6    24.05 )-----------( 142      ( 20 Sep 2023 06:47 )             25.3         QNS  |  QNS  |  96<H>  ----------------------------<  94  QNS   |  7<LL>  |  3.63<H>    Ca    8.1<L>      20 Sep 2023 06:47  Phos  5.1       Mg     2.70         TPro  6.2  /  Alb  2.3<L>  /  TBili  0.7  /  DBili  x   /  AST  162<H>  /  ALT  62<H>  /  AlkPhos  954<H>            Venous: 23 @ 11:32 FiO2: -- Oxygen Sat% 34.6    Urinalysis Basic - ( 20 Sep 2023 06:47 )    Color: x / Appearance: x / SG: x / pH: x  Gluc: 94 mg/dL / Ketone: x  / Bili: x / Urobili: x   Blood: x / Protein: x / Nitrite: x   Leuk Esterase: x / RBC: x / WBC x   Sq Epi: x / Non Sq Epi: x / Bacteria: x          CAPILLARY BLOOD GLUCOSE      POCT Blood Glucose.: 109 mg/dL (20 Sep 2023 15:07)  POCT Blood Glucose.: 102 mg/dL (20 Sep 2023 12:13)  POCT Blood Glucose.: 101 mg/dL (20 Sep 2023 08:41)      RADIOLOGY & ADDITIONAL TESTS:    ASSESSMENT/PLAN:  59 yo F with Pmhx of HTN and unknown primary metastatic cancer and DVT on eliquis presents to the ED with bloody diarrhea, with pancreatic/colonic mass w/ mets to lungs, prior biopsy showing adenocarcinoma w/ unknown primary, admitted for infectous work-up, malignancy work-up, MESHA, course c/b code blue on , ROSC obtained and patient admitted to MICU for post cardiac-arrest care.     ===NEURO===  -Patient intubated and sedated post-cardiac arrest  -Continue sedation for now    ===CARDIAC===  #Cardiac Arrest c/b clot-in-transit  - Patient with code blue on , unknown rhythm at the time.   - Intubated and sedated  - Course c/b clot-in-transit seen on POCUS  - f/u formal echocardiogram for cardiac function and evaluation of clot  - Heparin drip for AC    #Shock  - Mixed septic cardiogenic in s/o cardiac arrest  - Management of septic shock w/ infectous w/u, antibiotics  - f/u TTE as above, maintain euvolemia    ===PULM===  -Intubated and Sedated  -Wean sedation as able    ===RENAL===  #MESHA c/b acidosis, hyperkalemia  -Nephrology consulted, appreciate recommendations for urgent HD due to acidosis  -f/u nephrology recommendations re: HD    ===GI===  -NPO for now  -CTM BMs    ===HEME/ONC===  #Bloody Diarrhea w/ anemia  -hgb 7.6-8, initially presented w/ bloody diarrhea  -CTM BMs    #Adenocarcinoma of unknown primary, pancreatic/colonic mass  #c/f TLS  -Heme/Onc consulted, appreciate recommendations    -f/u daily TLS labs - K, Uric Acid, Ca, Phos  -No plan for inpatient chemo at this time  -Allopurinol    ===ID===  -BCx and UCx on admission NGTD so far  -CTAP and CT chest w/o focus of infection    ===ENDO===  -Last a1c 5.4  -    DVT  - Heparin drip for AC MICU Accept Note    CHIEF COMPLAINT:     HPI:  59 yo F with Pmhx of HTN and unknown primary metastatic cancer and DVT on eliquis presents to the ED with bloody diarrhea. Patient reports that she was discharged from the hospital on . Since discharge she has been stable w/o any diarrhea until yesterday. She has been having watery, diarrhea multiple times per day since yesterday. It is brown in color and it sometimes has some streaks of blood mixed with it. She denies any melena or hematochezia. She has nausea and has been vomiting up food materials. She also reports to have worsening b/l LE edema which got worse over the last couple of days. She has been drinking 2-3 500cc bottle of water every day but her PO intake has also decreased. Of note. she was recently diagnosed with metastatic cancer-unknown primary. She underwent EGD and colonoscopy, pending path results. Her hospitalization was complicated by TLS and MESHA which improved prior to discharge     In the ED, her vitals were notable for tachycardia. Labs were notable for chronic but stable anemia, leukocytosis, elevated INR, PT, elevated LDH, hyperkalemia, low bicarb, elevated AG, elevated BUN/Scr, elevated ALP, AST/ALT, pH of 7.27->7.30, lactic acidosis. EKG showed sinus tachycardia.     INTERVAL HISTORY:  Patient was admitted for management of severe sepsis undergoing infectious work-up, work-up of malignancy w/ pancreatic/colonic mass w/ mets to lungs, biopsy showing adenocarcinoma w/ unknown primary. Course complicated by anemia, MESHA, and hyperkalemia. Course complicated on  by code blue - patient became more drowsy, agonal breathing, previously AOx4, unknown rhythm at time of apnea and initiation of compressions. ROSC obtained and patient transferred to MICU for post-cardiac arrest care.     PAST MEDICAL & SURGICAL HISTORY:  HTN (hypertension)      Malignancy      MESHA (acute kidney injury)      Tumor lysis syndrome      History of           FAMILY HISTORY:  No pertinent family history in first degree relatives        Social History:      HOME MEDICATIONS:  Home Medications:  pantoprazole 40 mg oral delayed release tablet: 1 tab(s) orally once a day (18 Sep 2023 17:27)  sertraline 25 mg oral tablet: 1 tab(s) orally once a day (18 Sep 2023 17:27)  traZODone 50 mg oral tablet: 1 tab(s) orally once a day (at bedtime) (18 Sep 2023 17:27)      Allergies    No Known Allergies    Intolerances        REVIEW OF SYSTEMS:  Constitutional: No fevers, chills, weight loss, weight gain  HEENT: No vision problems, eye pain, nasal congestion, rhinorrhea, sore throat, dysphagia  CV: No chest pain, orthopnea, palpitations  Resp: No cough, dyspnea, wheezing, hemoptysis  GI: No nausea, vomiting, diarrhea, constipation, abdominal pain  : [ ] dysuria [ ] nocturia [ ] hematuria [ ] increased urinary frequency  Musculoskeletal: [ ] back pain [ ] myalgias [ ] arthralgias [ ] fracture  Skin: [ ] rash [ ] itch  Neurological: [ ] headache [ ] dizziness [ ] syncope [ ] weakness [ ] numbness  Psychiatric: [ ] anxiety [ ] depression  Endocrine: [ ] diabetes [ ] thyroid problem  Hematologic/Lymphatic: [ ] anemia [ ] bleeding problem  Allergic/Immunologic: [ ] itchy eyes [ ] nasal discharge [ ] hives [ ] angioedema  [ ] All other systems negative  [ ] Unable to assess ROS because ________    OBJECTIVE:  ICU Vital Signs Last 24 Hrs  T(C): 36.7 (20 Sep 2023 16:20), Max: 36.7 (20 Sep 2023 16:20)  T(F): 98 (20 Sep 2023 16:20), Max: 98 (20 Sep 2023 16:20)  HR: 114 (20 Sep 2023 16:25) (89 - 132)  BP: 124/73 (20 Sep 2023 16:25) (90/42 - 132/114)  BP(mean): 89 (20 Sep 2023 16:25) (82 - 122)  ABP: --  ABP(mean): --  RR: 22 (20 Sep 2023 16:25) (17 - 28)  SpO2: 100% (20 Sep 2023 16:25) (97% - 100%)    O2 Parameters below as of 20 Sep 2023 16:05  Patient On (Oxygen Delivery Method): ventilator    O2 Concentration (%): 100          CAPILLARY BLOOD GLUCOSE      POCT Blood Glucose.: 109 mg/dL (20 Sep 2023 15:07)      PHYSICAL EXAM:  General:   HEENT:   Neck:   Chest/Lungs:  Heart:  Abdomen:   Extremities:   Skin:   Neuro:   Psych:     LINES:     HOSPITAL MEDICATIONS:  MEDICATIONS  (STANDING):  allopurinol 100 milliGRAM(s) Oral daily  chlorhexidine 0.12% Liquid 15 milliLiter(s) Oral Mucosa every 12 hours  chlorhexidine 2% Cloths 1 Application(s) Topical daily  petrolatum Ophthalmic Ointment 1 Application(s) Both EYES daily  piperacillin/tazobactam IVPB. 3.375 Gram(s) IV Intermittent once    MEDICATIONS  (PRN):      LABS:                        7.6    24.05 )-----------( 142      ( 20 Sep 2023 06:47 )             25.3         QNS  |  QNS  |  96<H>  ----------------------------<  94  QNS   |  7<LL>  |  3.63<H>    Ca    8.1<L>      20 Sep 2023 06:47  Phos  5.1       Mg     2.70         TPro  6.2  /  Alb  2.3<L>  /  TBili  0.7  /  DBili  x   /  AST  162<H>  /  ALT  62<H>  /  AlkPhos  954<H>            Venous: 23 @ 11:32 FiO2: -- Oxygen Sat% 34.6    Urinalysis Basic - ( 20 Sep 2023 06:47 )    Color: x / Appearance: x / SG: x / pH: x  Gluc: 94 mg/dL / Ketone: x  / Bili: x / Urobili: x   Blood: x / Protein: x / Nitrite: x   Leuk Esterase: x / RBC: x / WBC x   Sq Epi: x / Non Sq Epi: x / Bacteria: x          CAPILLARY BLOOD GLUCOSE      POCT Blood Glucose.: 109 mg/dL (20 Sep 2023 15:07)  POCT Blood Glucose.: 102 mg/dL (20 Sep 2023 12:13)  POCT Blood Glucose.: 101 mg/dL (20 Sep 2023 08:41)      RADIOLOGY & ADDITIONAL TESTS:    ASSESSMENT/PLAN:  59 yo F with Pmhx of HTN and unknown primary metastatic cancer and DVT on eliquis presents to the ED with bloody diarrhea, with pancreatic/colonic mass w/ mets to lungs, prior biopsy showing adenocarcinoma w/ unknown primary, admitted for infectous work-up, malignancy work-up, MESHA, course c/b code blue on , ROSC obtained and patient admitted to MICU for post cardiac-arrest care.     ===NEURO===  -Patient intubated and sedated post-cardiac arrest  -Continue sedation for now    ===CARDIAC===  #Cardiac Arrest c/b clot-in-transit  - Patient with code blue on , unknown rhythm at the time.   - Intubated and sedated  - Course c/b clot-in-transit seen on POCUS  - f/u formal echocardiogram for cardiac function and evaluation of clot  - Heparin drip for AC    #Shock  - Mixed septic cardiogenic in s/o cardiac arrest  - Management of septic shock w/ infectous w/u, antibiotics  - f/u TTE as above, maintain euvolemia    ===PULM===  -Intubated and Sedated  -Wean sedation as able    ===RENAL===  #MESHA c/b acidosis, hyperkalemia  -Nephrology consulted, appreciate recommendations for urgent HD due to acidosis  -f/u nephrology recommendations re: HD    ===GI===  -NPO for now  -CTM BMs    ===HEME/ONC===  #Bloody Diarrhea w/ anemia  -hgb 7.6-8, initially presented w/ bloody diarrhea  -CTM BMs    #Adenocarcinoma of unknown primary, pancreatic/colonic mass  #c/f TLS  -Heme/Onc consulted, appreciate recommendations    -f/u daily TLS labs - K, Uric Acid, Ca, Phos  -No plan for inpatient chemo at this time  -Allopurinol    ===ID===  #Leukocytosis  -Initially concern for severe sepsis on admission  -BCx and UCx on admission NGTD so far  -CTAP and CT chest w/o focus of infection  -Continue Zosyn for now, consider ID consultation    ===ENDO===  -Last a1c 5.4  -CTM glucoses    DVT  - Heparin drip for AC MICU Accept Note    CHIEF COMPLAINT:     HPI:  59 yo F with Pmhx of HTN and unknown primary metastatic cancer and DVT on eliquis presents to the ED with bloody diarrhea. Patient reports that she was discharged from the hospital on . Since discharge she has been stable w/o any diarrhea until yesterday. She has been having watery, diarrhea multiple times per day since yesterday. It is brown in color and it sometimes has some streaks of blood mixed with it. She denies any melena or hematochezia. She has nausea and has been vomiting up food materials. She also reports to have worsening b/l LE edema which got worse over the last couple of days. She has been drinking 2-3 500cc bottle of water every day but her PO intake has also decreased. Of note. she was recently diagnosed with metastatic cancer-unknown primary. She underwent EGD and colonoscopy, pending path results. Her hospitalization was complicated by TLS and MESHA which improved prior to discharge     In the ED, her vitals were notable for tachycardia. Labs were notable for chronic but stable anemia, leukocytosis, elevated INR, PT, elevated LDH, hyperkalemia, low bicarb, elevated AG, elevated BUN/Scr, elevated ALP, AST/ALT, pH of 7.27->7.30, lactic acidosis. EKG showed sinus tachycardia.     INTERVAL HISTORY:  Patient was admitted for management of severe sepsis undergoing infectious work-up, work-up of malignancy w/ pancreatic/colonic mass w/ mets to lungs, biopsy showing adenocarcinoma w/ unknown primary. Course complicated by anemia, MESHA, and hyperkalemia. Course complicated on  by code blue - patient became more drowsy, agonal breathing, previously AOx4, unknown rhythm at time of apnea and initiation of compressions. ROSC obtained and patient transferred to MICU for post-cardiac arrest care.     PAST MEDICAL & SURGICAL HISTORY:  HTN (hypertension)      Malignancy      MESHA (acute kidney injury)      Tumor lysis syndrome      History of           FAMILY HISTORY:  No pertinent family history in first degree relatives        Social History:      HOME MEDICATIONS:  Home Medications:  pantoprazole 40 mg oral delayed release tablet: 1 tab(s) orally once a day (18 Sep 2023 17:27)  sertraline 25 mg oral tablet: 1 tab(s) orally once a day (18 Sep 2023 17:27)  traZODone 50 mg oral tablet: 1 tab(s) orally once a day (at bedtime) (18 Sep 2023 17:27)      Allergies    No Known Allergies    Intolerances        REVIEW OF SYSTEMS:  Constitutional: No fevers, chills, weight loss, weight gain  HEENT: No vision problems, eye pain, nasal congestion, rhinorrhea, sore throat, dysphagia  CV: No chest pain, orthopnea, palpitations  Resp: No cough, dyspnea, wheezing, hemoptysis  GI: No nausea, vomiting, diarrhea, constipation, abdominal pain  : [ ] dysuria [ ] nocturia [ ] hematuria [ ] increased urinary frequency  Musculoskeletal: [ ] back pain [ ] myalgias [ ] arthralgias [ ] fracture  Skin: [ ] rash [ ] itch  Neurological: [ ] headache [ ] dizziness [ ] syncope [ ] weakness [ ] numbness  Psychiatric: [ ] anxiety [ ] depression  Endocrine: [ ] diabetes [ ] thyroid problem  Hematologic/Lymphatic: [ ] anemia [ ] bleeding problem  Allergic/Immunologic: [ ] itchy eyes [ ] nasal discharge [ ] hives [ ] angioedema  [ ] All other systems negative  [ ] Unable to assess ROS because ________    OBJECTIVE:  ICU Vital Signs Last 24 Hrs  T(C): 36.7 (20 Sep 2023 16:20), Max: 36.7 (20 Sep 2023 16:20)  T(F): 98 (20 Sep 2023 16:20), Max: 98 (20 Sep 2023 16:20)  HR: 114 (20 Sep 2023 16:25) (89 - 132)  BP: 124/73 (20 Sep 2023 16:25) (90/42 - 132/114)  BP(mean): 89 (20 Sep 2023 16:25) (82 - 122)  ABP: --  ABP(mean): --  RR: 22 (20 Sep 2023 16:25) (17 - 28)  SpO2: 100% (20 Sep 2023 16:25) (97% - 100%)    O2 Parameters below as of 20 Sep 2023 16:05  Patient On (Oxygen Delivery Method): ventilator    O2 Concentration (%): 100          CAPILLARY BLOOD GLUCOSE      POCT Blood Glucose.: 109 mg/dL (20 Sep 2023 15:07)      PHYSICAL EXAM:  General:   HEENT:   Neck:   Chest/Lungs:  Heart:  Abdomen:   Extremities:   Skin:   Neuro:   Psych:     LINES:     HOSPITAL MEDICATIONS:  MEDICATIONS  (STANDING):  allopurinol 100 milliGRAM(s) Oral daily  chlorhexidine 0.12% Liquid 15 milliLiter(s) Oral Mucosa every 12 hours  chlorhexidine 2% Cloths 1 Application(s) Topical daily  petrolatum Ophthalmic Ointment 1 Application(s) Both EYES daily  piperacillin/tazobactam IVPB. 3.375 Gram(s) IV Intermittent once    MEDICATIONS  (PRN):      LABS:                        7.6    24.05 )-----------( 142      ( 20 Sep 2023 06:47 )             25.3         QNS  |  QNS  |  96<H>  ----------------------------<  94  QNS   |  7<LL>  |  3.63<H>    Ca    8.1<L>      20 Sep 2023 06:47  Phos  5.1       Mg     2.70         TPro  6.2  /  Alb  2.3<L>  /  TBili  0.7  /  DBili  x   /  AST  162<H>  /  ALT  62<H>  /  AlkPhos  954<H>            Venous: 23 @ 11:32 FiO2: -- Oxygen Sat% 34.6    Urinalysis Basic - ( 20 Sep 2023 06:47 )    Color: x / Appearance: x / SG: x / pH: x  Gluc: 94 mg/dL / Ketone: x  / Bili: x / Urobili: x   Blood: x / Protein: x / Nitrite: x   Leuk Esterase: x / RBC: x / WBC x   Sq Epi: x / Non Sq Epi: x / Bacteria: x          CAPILLARY BLOOD GLUCOSE      POCT Blood Glucose.: 109 mg/dL (20 Sep 2023 15:07)  POCT Blood Glucose.: 102 mg/dL (20 Sep 2023 12:13)  POCT Blood Glucose.: 101 mg/dL (20 Sep 2023 08:41)      RADIOLOGY & ADDITIONAL TESTS:    ASSESSMENT/PLAN:  59 yo F with Pmhx of HTN and unknown primary metastatic cancer and DVT on eliquis presents to the ED with bloody diarrhea, with pancreatic/colonic mass w/ mets to lungs, prior biopsy showing adenocarcinoma w/ unknown primary, admitted for infectous work-up, malignancy work-up, MESHA, course c/b code blue on , ROSC obtained and patient admitted to MICU for post cardiac-arrest care.     ===NEURO===  -Patient intubated and sedated post-cardiac arrest  -Continue sedation for now    ===CARDIAC===  #Cardiac Arrest c/b clot-in-transit  - Patient with code blue on , unknown rhythm at the time.   - Intubated and sedated  - Course c/b clot-in-transit seen on POCUS  - f/u formal echocardiogram for cardiac function and evaluation of clot  - Heparin drip for AC    #Shock  - Mixed septic cardiogenic in s/o cardiac arrest  - Management of septic shock w/ infectous w/u, antibiotics  - f/u TTE as above, maintain euvolemia    ===PULM===  -Intubated and Sedated  -Wean sedation as able    ===RENAL===  #MESHA c/b acidosis, hyperkalemia  -Nephrology consulted, appreciate recommendations for urgent HD due to acidosis  -f/u nephrology recommendations re: HD    ===GI===  -NPO for now  -CTM BMs    ===HEME/ONC===  #Bloody Diarrhea w/ anemia  -hgb 7.6-8, initially presented w/ bloody diarrhea  -CTM BMs    #Adenocarcinoma of unknown primary, pancreatic/colonic mass  #c/f TLS  -Heme/Onc consulted, appreciate recommendations    -f/u daily TLS labs - K, Uric Acid, Ca, Phos  -No plan for inpatient chemo at this time  -Allopurinol    ===ID===  #Leukocytosis  -Initially concern for severe sepsis on admission  -BCx and UCx on admission NGTD so far  -CTAP and CT chest w/o focus of infection  -Continue Zosyn for now, consider ID consultation    ===ENDO===  -Last a1c 5.4  -CTM glucoses    DVT  - Heparin drip for AC    Attending Attestation     59 yo F with Pmhx of HTN and unknown primary metastatic cancer and DVT on eliquis presents to the ED with bloody diarrhea MICU Accept Note    CHIEF COMPLAINT:     HPI:  59 yo F with Pmhx of HTN and unknown primary metastatic cancer and DVT on eliquis presents to the ED with bloody diarrhea. Patient reports that she was discharged from the hospital on . Since discharge she has been stable w/o any diarrhea until yesterday. She has been having watery, diarrhea multiple times per day since yesterday. It is brown in color and it sometimes has some streaks of blood mixed with it. She denies any melena or hematochezia. She has nausea and has been vomiting up food materials. She also reports to have worsening b/l LE edema which got worse over the last couple of days. She has been drinking 2-3 500cc bottle of water every day but her PO intake has also decreased. Of note. she was recently diagnosed with metastatic cancer-unknown primary. She underwent EGD and colonoscopy, pending path results. Her hospitalization was complicated by TLS and MESHA which improved prior to discharge     In the ED, her vitals were notable for tachycardia. Labs were notable for chronic but stable anemia, leukocytosis, elevated INR, PT, elevated LDH, hyperkalemia, low bicarb, elevated AG, elevated BUN/Scr, elevated ALP, AST/ALT, pH of 7.27->7.30, lactic acidosis. EKG showed sinus tachycardia.     INTERVAL HISTORY:  Patient was admitted for management of severe sepsis undergoing infectious work-up, work-up of malignancy w/ pancreatic/colonic mass w/ mets to lungs, biopsy showing adenocarcinoma w/ unknown primary. Course complicated by anemia, MESHA, and hyperkalemia. Course complicated on  by code blue - patient became more drowsy, agonal breathing, previously AOx4, unknown rhythm at time of apnea and initiation of compressions. ROSC obtained and patient transferred to MICU for post-cardiac arrest care.     PAST MEDICAL & SURGICAL HISTORY:  HTN (hypertension)      Malignancy      MESHA (acute kidney injury)      Tumor lysis syndrome      History of           FAMILY HISTORY:  No pertinent family history in first degree relatives        Social History:      HOME MEDICATIONS:  Home Medications:  pantoprazole 40 mg oral delayed release tablet: 1 tab(s) orally once a day (18 Sep 2023 17:27)  sertraline 25 mg oral tablet: 1 tab(s) orally once a day (18 Sep 2023 17:27)  traZODone 50 mg oral tablet: 1 tab(s) orally once a day (at bedtime) (18 Sep 2023 17:27)      Allergies    No Known Allergies    Intolerances        REVIEW OF SYSTEMS:  Constitutional: No fevers, chills, weight loss, weight gain  HEENT: No vision problems, eye pain, nasal congestion, rhinorrhea, sore throat, dysphagia  CV: No chest pain, orthopnea, palpitations  Resp: No cough, dyspnea, wheezing, hemoptysis  GI: No nausea, vomiting, diarrhea, constipation, abdominal pain  : [ ] dysuria [ ] nocturia [ ] hematuria [ ] increased urinary frequency  Musculoskeletal: [ ] back pain [ ] myalgias [ ] arthralgias [ ] fracture  Skin: [ ] rash [ ] itch  Neurological: [ ] headache [ ] dizziness [ ] syncope [ ] weakness [ ] numbness  Psychiatric: [ ] anxiety [ ] depression  Endocrine: [ ] diabetes [ ] thyroid problem  Hematologic/Lymphatic: [ ] anemia [ ] bleeding problem  Allergic/Immunologic: [ ] itchy eyes [ ] nasal discharge [ ] hives [ ] angioedema  [ ] All other systems negative  [ ] Unable to assess ROS because ________    OBJECTIVE:  ICU Vital Signs Last 24 Hrs  T(C): 36.7 (20 Sep 2023 16:20), Max: 36.7 (20 Sep 2023 16:20)  T(F): 98 (20 Sep 2023 16:20), Max: 98 (20 Sep 2023 16:20)  HR: 114 (20 Sep 2023 16:25) (89 - 132)  BP: 124/73 (20 Sep 2023 16:25) (90/42 - 132/114)  BP(mean): 89 (20 Sep 2023 16:25) (82 - 122)  ABP: --  ABP(mean): --  RR: 22 (20 Sep 2023 16:25) (17 - 28)  SpO2: 100% (20 Sep 2023 16:25) (97% - 100%)    O2 Parameters below as of 20 Sep 2023 16:05  Patient On (Oxygen Delivery Method): ventilator    O2 Concentration (%): 100          CAPILLARY BLOOD GLUCOSE      POCT Blood Glucose.: 109 mg/dL (20 Sep 2023 15:07)      PHYSICAL EXAM:  General:   HEENT:   Neck:   Chest/Lungs:  Heart:  Abdomen:   Extremities:   Skin:   Neuro:   Psych:     LINES:     HOSPITAL MEDICATIONS:  MEDICATIONS  (STANDING):  allopurinol 100 milliGRAM(s) Oral daily  chlorhexidine 0.12% Liquid 15 milliLiter(s) Oral Mucosa every 12 hours  chlorhexidine 2% Cloths 1 Application(s) Topical daily  petrolatum Ophthalmic Ointment 1 Application(s) Both EYES daily  piperacillin/tazobactam IVPB. 3.375 Gram(s) IV Intermittent once    MEDICATIONS  (PRN):      LABS:                        7.6    24.05 )-----------( 142      ( 20 Sep 2023 06:47 )             25.3         QNS  |  QNS  |  96<H>  ----------------------------<  94  QNS   |  7<LL>  |  3.63<H>    Ca    8.1<L>      20 Sep 2023 06:47  Phos  5.1       Mg     2.70         TPro  6.2  /  Alb  2.3<L>  /  TBili  0.7  /  DBili  x   /  AST  162<H>  /  ALT  62<H>  /  AlkPhos  954<H>            Venous: 23 @ 11:32 FiO2: -- Oxygen Sat% 34.6    Urinalysis Basic - ( 20 Sep 2023 06:47 )    Color: x / Appearance: x / SG: x / pH: x  Gluc: 94 mg/dL / Ketone: x  / Bili: x / Urobili: x   Blood: x / Protein: x / Nitrite: x   Leuk Esterase: x / RBC: x / WBC x   Sq Epi: x / Non Sq Epi: x / Bacteria: x          CAPILLARY BLOOD GLUCOSE      POCT Blood Glucose.: 109 mg/dL (20 Sep 2023 15:07)  POCT Blood Glucose.: 102 mg/dL (20 Sep 2023 12:13)  POCT Blood Glucose.: 101 mg/dL (20 Sep 2023 08:41)      RADIOLOGY & ADDITIONAL TESTS:    ASSESSMENT/PLAN:  59 yo F with Pmhx of HTN and unknown primary metastatic cancer and DVT on eliquis presents to the ED with bloody diarrhea, with pancreatic/colonic mass w/ mets to lungs, prior biopsy showing adenocarcinoma w/ unknown primary, admitted for infectous work-up, malignancy work-up, MESHA, course c/b code blue on , ROSC obtained and patient admitted to MICU for post cardiac-arrest care.     ===NEURO===  -Patient intubated and sedated post-cardiac arrest  -Continue sedation for now    ===CARDIAC===  #Cardiac Arrest c/b clot-in-transit  - Patient with code blue on , unknown rhythm at the time.   - Intubated and sedated  - Course c/b clot-in-transit seen on POCUS  - f/u formal echocardiogram for cardiac function and evaluation of clot  - Heparin drip for AC    #Shock  - Mixed septic cardiogenic in s/o cardiac arrest  - Management of septic shock w/ infectous w/u, antibiotics  - f/u TTE as above, maintain euvolemia    ===PULM===  -Intubated and Sedated  -Wean sedation as able    ===RENAL===  #MESHA c/b acidosis, hyperkalemia  -Nephrology consulted, appreciate recommendations for urgent HD due to acidosis  -f/u nephrology recommendations re: HD    ===GI===  -NPO for now  -CTM BMs    ===HEME/ONC===  #Bloody Diarrhea w/ anemia  -hgb 7.6-8, initially presented w/ bloody diarrhea  -CTM BMs    #Adenocarcinoma of unknown primary, pancreatic/colonic mass  #c/f TLS  -Heme/Onc consulted, appreciate recommendations    -f/u daily TLS labs - K, Uric Acid, Ca, Phos  -No plan for inpatient chemo at this time  -Allopurinol    ===ID===  #Leukocytosis  -Initially concern for severe sepsis on admission  -BCx and UCx on admission NGTD so far  -CTAP and CT chest w/o focus of infection  -Continue Zosyn for now, consider ID consultation    ===ENDO===  -Last a1c 5.4  -CTM glucoses    DVT  - Heparin drip for AC    Attending Attestation     I have personally seen and examined the patient.  I fully participated in the care of this patient.  I have made amendments to the documentation where necessary, and agree with the history, physical exam, and plan as documented by the Resident.     Attending: I have personally and independently provided 105 minutes of critical care services.  This excludes any time spent on separate procedures or teaching    59 yo F with Pmhx of HTN and unknown primary metastatic cancer and DVT on eliquis presents to the ED with bloody diarrhea.     Patient has recent dx of likely metastatic colon cancer, with diffuse mets. Has not been on any DMT. Had hx of rectal bleeding. Admitted initially for UTI, MESHA, lactic acidosis. A/C was initally held. On CTX however, urine cultures not sent.     MICU consulted for worsening labs and hypotension. Patient had Code blue shortly after but did not lose pulse. Found to be hypotensive, s/p intubation, on 2 pressors, in shock and renal failure with hyperkalemia. On bedside POCUS with hyperdynamic LV, enlarged RV with decreased function and Mconnell's signs. In the RV with large mobile mass. Pending shiely placement for CRRT.     # Shock likely obstructive  # MESHA  # Lactic acidosis  # RV thrombus/malgnancy  # RV failure  # acute hypoxemic respiratory failure  # UTI  # DVT  # Metastatic colon cancer  - Shock, large mobile mass in the RV, concerning for thrombus or cancer. RV failure.   - C/W Levo and vaso, wean as tolerated.   - Pending CRRT for renal failure and hyperkalemia. Renal consulted. Temporize K, place melendez.   - Patient clinical picture concerning for likely massive PE. Given diffuse Mets with colon cancer, GIB patient not a good candidate for TPA. Case also reviewed with interventional cards, and patient also not a candidate for Cath guided thrombectomy/thrombolysis. Will d/c apixaban and c/w hep gtt for now.   - Escalate abx to zosyn. Check pan cultures  - Will confirm thrombus with stat TTE  - C/W vent, adjust vent based on blood gas. F/U post CXR and Gas  - CTH, CTA chest, and CT A/P when stable. Currently too unstable for transport for CT.   - DVT ppx- on full a/c  - Patient given diffuse metastatic disease, massive PE, RV failure, has overall dismal prognosis. Not a candidate for systemic TPA or catheter directed therapies. Had lengthy discussion with , son and daughter at bedside.

## 2023-09-20 NOTE — PROGRESS NOTE ADULT - PROBLEM SELECTOR PLAN 5
Patient with MESHA and metabolic acidosis with elevated AG (chronic)  -Pt was discharged with Scr of 1.6, now increased to 2.13 -->2.72  -Most likely due to diarrhea, dehydration, and sepsis   - Pt s/p IVF, will give gentle hydration NS at 75ml /hr for 12 hrs   -Trend SCr   -F/u with UA   -US abdomen Patient with MESHA and metabolic acidosis with elevated AG (chronic)  -Pt was discharged with Scr of 1.6, now increased to 2.13 -->2.72-->3.6   -Most likely due to diarrhea, dehydration, and sepsis   - Pt s/p IVF, will give gentle hydration NS at 75ml /hr for 12 hrs   Bicarb noted to be 7 on BMP, repeat  on VBG 14 which is what it has been like   - will check renal US , r/o malignancy causing obstruction and hydronephrosis  - Renal eval requested, will f/u recs  - case was d/w Renal, recommended MICU eval, MICU eval requested, case d/w MICU fellow,   Micu to evaluate fellow , will f/u recs

## 2023-09-20 NOTE — CONSULT NOTE ADULT - SUBJECTIVE AND OBJECTIVE BOX
Sal Carter MD  Cardiology Fellow  [phone]  All Cardiology service information can be found  on [email or web address], password: [email or web address]    Patient seen and evaluated at bedside    Chief Complaint:    HPI:  59 yo F with Pmhx of HTN and unknown primary metastatic cancer and DVT on eliquis presents to the ED with bloody diarrhea, also suspicion for sepsis. Also workup for metastatic cancer w/ mets to lungs, biopsy showing adenocarcinoma with unknown primary. Code blue was called for agonal breathing, CPR was initiated. Unclear if ever lost a pulse. Patient transferred to the MICU. POCUS in the MICU notable for RV dilation, Burrows's sign, and clot in transit.     PMHx:   HTN (hypertension)    Malignancy    MESHA (acute kidney injury)    Tumor lysis syndrome        PSHx:   No significant past surgical history    History of         Allergies:  No Known Allergies      Home Meds:    Current Medications:   allopurinol 100 milliGRAM(s) Oral daily  calcium gluconate IVPB 2 Gram(s) IV Intermittent once  chlorhexidine 0.12% Liquid 15 milliLiter(s) Oral Mucosa every 12 hours  chlorhexidine 2% Cloths 1 Application(s) Topical daily  CRRT Treatment    <Continuous>  dextrose 50% Injectable 100 milliLiter(s) IV Push once  heparin   Injectable 7000 Unit(s) IV Push every 6 hours PRN  heparin   Injectable 3500 Unit(s) IV Push every 6 hours PRN  heparin  Infusion.  Unit(s)/Hr IV Continuous <Continuous>  insulin regular  human recombinant 10 Unit(s) IV Push once  norepinephrine Infusion 0.05 MICROgram(s)/kG/Min IV Continuous <Continuous>  petrolatum Ophthalmic Ointment 1 Application(s) Both EYES daily  phenylephrine    Infusion 1 MICROgram(s)/kG/Min IV Continuous <Continuous>  piperacillin/tazobactam IVPB. 3.375 Gram(s) IV Intermittent once  PrismaSATE Dialysate BK 0 / 3.5 5000 milliLiter(s) CRRT <Continuous>  PrismaSOL Filtration BGK 0 / 2.5 5000 milliLiter(s) CRRT <Continuous>  PrismaSOL Filtration BGK 0 / 2.5 5000 milliLiter(s) CRRT <Continuous>  propofol Infusion 30 MICROgram(s)/kG/Min IV Continuous <Continuous>  sodium bicarbonate  Injectable 50 milliEquivalent(s) IV Push once  vancomycin  IVPB 1000 milliGRAM(s) IV Intermittent once  vasopressin Infusion 0.04 Unit(s)/Min IV Continuous <Continuous>      FAMILY HISTORY:  No pertinent family history in first degree relatives    REVIEW OF SYSTEMS:  All other review of systems is negative unless indicated above.    Physical Exam:  T(F): 98 (), Max: 98 ()  HR: 103 () (94 - 132)  BP: 124/73 () (90/42 - 132/114)  RR: 24 ()  SpO2: 100% ()    GENERAL: Intubated, sedated  HEAD:  Atraumatic, Normocephalic  CHEST/LUNG: Ventilator sounds b/l  HEART: tachycardic  ABDOMEN: Soft, nondistended  PSYCH: unable to assess    CXR: Personally reviewed    Labs: Personally reviewed                        7.1    32.61 )-----------( 168      ( 20 Sep 2023 16:20 )             23.6         143  |  108<H>  |  97<H>  ----------------------------<  107<H>  6.1<H>   |  13<L>  |  4.11<H>    Ca    9.3      20 Sep 2023 16:20  Phos  8.9       Mg     2.80         TPro  5.9<L>  /  Alb  2.4<L>  /  TBili  0.9  /  DBili  x   /  AST  252<H>  /  ALT  75<H>  /  AlkPhos  833<H>      PTT - ( 20 Sep 2023 16:20 )  PTT:29.1 sec    CARDIAC MARKERS ( 20 Sep 2023 16:20 )  47 ng/L / x     / x     / x     / x     / x              Total Cholesterol: 136  LDL: --  HDL: 26  T

## 2023-09-20 NOTE — PROCEDURE NOTE - NSPROCNAME_GEN_A_CORE
Central Line Insertion
Arterial Puncture/Cannulation
Central Line Insertion
Tracheal Intubation
Intraosseous Infusion
Intraosseous Infusion
Central Line Insertion

## 2023-09-20 NOTE — RAPID RESPONSE TEAM SUMMARY - NSSITUATIONBACKGROUNDRRT_GEN_ALL_CORE
58 F PMH HTN, unknown primary metastatic cancer, DVT on Eliquis presented initially with bloody diarrhea, found to have SIRS+, MESHA, hyperkalemia, lactic acidosis, elevated LFTs most likely secondary to worsening malignancy. RRT called for unresponsiveness and then upgraded to CODE BLUE. On arrival, patient had a pulse and on NRB. However, patient had altered mental status concerning for airway compromise. BVM started stat for ventilation. Hemodynamically stable and sinus rhythm noted on Holter monitor. Called anesthesia STAT for rapid sequence intubation. Patient was becoming hypotensive. Ordered 1L LR bolus x1 stat while levophed being prepared. Ordered CaCl 1g x1 and Bicarb 1 amp x2. Repeat BP transiently stable but decreased and levophed was started as patient was in process of being intubated. MICU was bedside was already consulted and were on their way to accept patient. Despite levophed dosing increased, patient still was hypotensive and second agent vasopressin started, after which BP stabilized. R femoral midline placed on field by MICU fellow. Patient remained hemodynamically stable and ventilated with SpO2 100% for MICU transfer.

## 2023-09-20 NOTE — PROGRESS NOTE ADULT - PROBLEM SELECTOR PLAN 2
Patient with acute on chronic diarrhea   -Had similar diarrhea during last admission  -GI PCR, C.diff were negative on 9/6/2023   -Will send repeat GI PCR   -Most likely due to malignancy   - pt says diarrhea has now resolved , will CTM Patient with acute on chronic diarrhea   -Had similar diarrhea during last admission  -GI PCR, C.diff were negative on 9/6/2023   -Will send repeat GI PCR once pt has a BM   -Most likely due to malignancy   - pt says diarrhea has now resolved , will CTM

## 2023-09-20 NOTE — CONSULT NOTE ADULT - ATTENDING COMMENTS
patient seen and examined   labs and vitals reviewed  agree with above assessment and plan  58F with active metastatic cancer (unknown primary), +DVT on a/c, initially p/w BRBPR, found to have a PE, clot in transit. course c/b shock requiring multiple pressors, now on CVVH.     PE/clot in transit.   -case d/w MICU team, interventional cardio team  -pt critically ill, on pressor support, sedated. resp failure, intubated.   -patient is a poor candidate for catheter directed therapies at this time given metastatic cancer as well as bilateral DVTs with limited catheter access sites.   -unclear if pt able to tolerate an invasive procedure at this time given tenuous state.  -lactate now 14.  -consider CT surgery evaluation, although unlikely to be a candidate for surgical embolectomy  -continue heparin gtt  -can consider tPA startagies if no contraindication  -continue care per MICU team
Pt. with MESHA, hyperkalemia and metabolic acidosis. Scr increased to 4.11 earlier today. Assessment and plan for MESHA and hyperkalemia as outlined above. Plan to initiate CRRT/CVVHDF in MICU. Monitor labs and urine output. Avoid any potential nephrotoxins. Dose medications as per eGFR. Overall prognosis guarded.

## 2023-09-20 NOTE — ADVANCED PRACTICE NURSE CONSULT - ASSESSMENT
Midline placed during medical rapid response. left arm cleansed with CHG. Using sterile technique under ultra sound guidance, placed PowerGlide Pro Midline 18G /10cm into left brachial vein. Brisk blood return and flushed with 20Mls of normal saline. Minimal blood loss and patient tolerated procedure well. CHG dressing placed. All sharps accounted for. LOT#: [id number], REF#: [id number]

## 2023-09-20 NOTE — PROGRESS NOTE ADULT - SUBJECTIVE AND OBJECTIVE BOX
Patient is a 58y old  Female who presents with a chief complaint of Diarrhea, blood in stool (19 Sep 2023 10:41)      SUBJECTIVE / OVERNIGHT EVENTS:    MEDICATIONS  (STANDING):  allopurinol 100 milliGRAM(s) Oral daily  apixaban 5 milliGRAM(s) Oral two times a day  cefTRIAXone   IVPB 1000 milliGRAM(s) IV Intermittent every 24 hours  lactated ringers. 1000 milliLiter(s) (250 mL/Hr) IV Continuous <Continuous>  pantoprazole    Tablet 40 milliGRAM(s) Oral before breakfast  sertraline 25 milliGRAM(s) Oral daily  sodium chloride 0.9%. 1000 milliLiter(s) (75 mL/Hr) IV Continuous <Continuous>  traZODone 50 milliGRAM(s) Oral at bedtime    MEDICATIONS  (PRN):  aluminum hydroxide/magnesium hydroxide/simethicone Suspension 30 milliLiter(s) Oral every 4 hours PRN Dyspepsia  melatonin 3 milliGRAM(s) Oral at bedtime PRN Insomnia  ondansetron Injectable 4 milliGRAM(s) IV Push every 8 hours PRN Nausea and/or Vomiting  oxyCODONE    IR 5 milliGRAM(s) Oral every 6 hours PRN Severe Pain (7 - 10)      Vital Signs Last 24 Hrs  T(C): 36.6 (20 Sep 2023 05:20), Max: 36.6 (19 Sep 2023 21:41)  T(F): 97.8 (20 Sep 2023 05:20), Max: 97.9 (19 Sep 2023 21:41)  HR: 101 (20 Sep 2023 05:20) (89 - 101)  BP: 97/46 (20 Sep 2023 05:20) (91/50 - 101/54)  BP(mean): 59 (19 Sep 2023 14:05) (59 - 59)  RR: 17 (20 Sep 2023 05:20) (17 - 18)  SpO2: 100% (20 Sep 2023 05:20) (97% - 100%)    Parameters below as of 20 Sep 2023 05:20  Patient On (Oxygen Delivery Method): room air      CAPILLARY BLOOD GLUCOSE      POCT Blood Glucose.: 101 mg/dL (20 Sep 2023 08:41)    I&O's Summary      PHYSICAL EXAM:  GENERAL: NAD, well-developed  HEAD:  Atraumatic, Normocephalic  EYES: EOMI, PERRLA, conjunctiva and sclera clear  NECK: Supple, No JVD  CHEST/LUNG: Clear to auscultation bilaterally; No wheeze  HEART: Regular rate and rhythm; No murmurs, rubs, or gallops  ABDOMEN: Soft, Nontender, Nondistended; Bowel sounds present  EXTREMITIES:  2+ Peripheral Pulses, No clubbing, cyanosis, or edema  PSYCH: AAOx3  NEUROLOGY: non-focal  SKIN: No rashes or lesions    LABS:                        7.6    24.05 )-----------( 142      ( 20 Sep 2023 06:47 )             25.3     09-20    QNS  |  QNS  |  96<H>  ----------------------------<  94  QNS   |  7<LL>  |  3.63<H>    Ca    8.1<L>      20 Sep 2023 06:47  Phos  5.1     09-19  Mg     2.70     09-19    TPro  6.2  /  Alb  2.3<L>  /  TBili  0.7  /  DBili  x   /  AST  162<H>  /  ALT  62<H>  /  AlkPhos  954<H>  09-19    PT/INR - ( 18 Sep 2023 15:19 )   PT: 17.7 sec;   INR: 1.60 ratio         PTT - ( 18 Sep 2023 15:19 )  PTT:27.5 sec      Urinalysis Basic - ( 20 Sep 2023 06:47 )    Color: x / Appearance: x / SG: x / pH: x  Gluc: 94 mg/dL / Ketone: x  / Bili: x / Urobili: x   Blood: x / Protein: x / Nitrite: x   Leuk Esterase: x / RBC: x / WBC x   Sq Epi: x / Non Sq Epi: x / Bacteria: x        RADIOLOGY & ADDITIONAL TESTS:    Imaging Personally Reviewed:    Consultant(s) Notes Reviewed:      Care Discussed with Consultants/Other Providers:   Patient is a 58y old  Female who presents with a chief complaint of Diarrhea, blood in stool (19 Sep 2023 10:41)      SUBJECTIVE / OVERNIGHT EVENTS: patient seen and examined by bedside at 11 Am, pt alert and awake , Pt says diarrhea has resolved, has not moved bowels today , back pain also eased up, pt denies headache, dizziness, SOB, CP, Palpitations , N/V/ abdominal pain at this time  Discussed with patient , if she is interested in getting IR guided pancreatic bx as inpatient , but pt says she prefers to get it done as out patient   Tele sinus tachy to 100s     MEDICATIONS  (STANDING):  allopurinol 100 milliGRAM(s) Oral daily  apixaban 5 milliGRAM(s) Oral two times a day  cefTRIAXone   IVPB 1000 milliGRAM(s) IV Intermittent every 24 hours  lactated ringers. 1000 milliLiter(s) (250 mL/Hr) IV Continuous <Continuous>  pantoprazole    Tablet 40 milliGRAM(s) Oral before breakfast  sertraline 25 milliGRAM(s) Oral daily  sodium chloride 0.9%. 1000 milliLiter(s) (75 mL/Hr) IV Continuous <Continuous>  traZODone 50 milliGRAM(s) Oral at bedtime    MEDICATIONS  (PRN):  aluminum hydroxide/magnesium hydroxide/simethicone Suspension 30 milliLiter(s) Oral every 4 hours PRN Dyspepsia  melatonin 3 milliGRAM(s) Oral at bedtime PRN Insomnia  ondansetron Injectable 4 milliGRAM(s) IV Push every 8 hours PRN Nausea and/or Vomiting  oxyCODONE    IR 5 milliGRAM(s) Oral every 6 hours PRN Severe Pain (7 - 10)      Vital Signs Last 24 Hrs  T(C): 36.6 (20 Sep 2023 05:20), Max: 36.6 (19 Sep 2023 21:41)  T(F): 97.8 (20 Sep 2023 05:20), Max: 97.9 (19 Sep 2023 21:41)  HR: 101 (20 Sep 2023 05:20) (89 - 101)  BP: 97/46 (20 Sep 2023 05:20) (91/50 - 101/54)  BP(mean): 59 (19 Sep 2023 14:05) (59 - 59)  RR: 17 (20 Sep 2023 05:20) (17 - 18)  SpO2: 100% (20 Sep 2023 05:20) (97% - 100%)    Parameters below as of 20 Sep 2023 05:20  Patient On (Oxygen Delivery Method): room air      CAPILLARY BLOOD GLUCOSE      POCT Blood Glucose.: 101 mg/dL (20 Sep 2023 08:41)    I&O's Summary      PHYSICAL EXAM:  GENERAL: NAD, well-developed  HEAD:  Atraumatic, Normocephalic  EYES: EOMI, PERRLA, conjunctiva and sclera clear  NECK: Supple, No JVD  CHEST/LUNG: Clear to auscultation bilaterally; No wheeze  HEART: Regular rate and rhythm; No murmurs, rubs, or gallops  ABDOMEN: Soft, Nontender, Nondistended; Bowel sounds present  EXTREMITIES:  2+ Peripheral Pulses, No clubbing, cyanosis,  3+ pitting edema b/l   PSYCH: AAOx3  NEUROLOGY: non-focal  SKIN: No rashes or lesions      LABS:                        7.6    24.05 )-----------( 142      ( 20 Sep 2023 06:47 )             25.3     09-20    QNS  |  QNS  |  96<H>  ----------------------------<  94  QNS   |  7<LL>  |  3.63<H>    Ca    8.1<L>      20 Sep 2023 06:47  Phos  5.1     09-19  Mg     2.70     09-19    TPro  6.2  /  Alb  2.3<L>  /  TBili  0.7  /  DBili  x   /  AST  162<H>  /  ALT  62<H>  /  AlkPhos  954<H>  09-19    PT/INR - ( 18 Sep 2023 15:19 )   PT: 17.7 sec;   INR: 1.60 ratio         PTT - ( 18 Sep 2023 15:19 )  PTT:27.5 sec      Urinalysis Basic - ( 20 Sep 2023 06:47 )    Color: x / Appearance: x / SG: x / pH: x  Gluc: 94 mg/dL / Ketone: x  / Bili: x / Urobili: x   Blood: x / Protein: x / Nitrite: x   Leuk Esterase: x / RBC: x / WBC x   Sq Epi: x / Non Sq Epi: x / Bacteria: x      Blood Gas Venous - Hemoglobin/Hematocrit (09.20.23 @ 11:32)    Total Hemoglobin, Calculated: 7.5 g/dL   Hematocrit, Calculated: 23.0 %  HCO3, Venous: 14 mmol/L (09.20.23 @ 11:32)    RADIOLOGY & ADDITIONAL TESTS:  Imaging Personally Reviewed:    Consultant(s) Notes Reviewed:      Care Discussed with Consultants/Other Providers: nephrology

## 2023-09-21 NOTE — PROGRESS NOTE ADULT - SUBJECTIVE AND OBJECTIVE BOX
[facility] Division of Kidney Diseases & Hypertension  FOLLOW UP NOTE  [phone]--------------------------------------------------------------------------------    HPI: 58-year-old female with metastatic cancer of unknown primary etiology, HTN, and DVT initially presented with bloody diarrhea on 9/18/23, admitted for management of sepsis. Hospital course complicated by MESHA, hyperkalemia and metabolic acidosis. Pt. had cardiac arrest earlier today (9/20/23), transferred to MICU. Pt. being seen for MESHA. On review of [place], Scr was 0.97 on 7/26/23. Scr increased to 4.70 on recent hospital admission (9/6), and decreased to 1.77 on hospital discharge (9/14). Scr initially during current admission was elevated at 2.02 (9/18), and subsequently trended up to 4.11 on 9/20. Pt. was initiated on CRRT on 9/20 for worsening renal function with hyperkalemia.      24 hour events/subjective: Pt. was seen and evaluated with family present in MICU. Pt. remains intubated, sedated, and requiring IV vasopressor support. Pt. unable to tolerate fluid removal with CRRT. Unable to obtain ROS.      PAST HISTORY  --------------------------------------------------------------------------------  No significant changes to PMH, PSH, FHx, SHx, unless otherwise noted    ALLERGIES & MEDICATIONS  --------------------------------------------------------------------------------  Allergies    No Known Allergies    Intolerances      Standing Inpatient Medications  allopurinol 100 milliGRAM(s) Oral daily  chlorhexidine 0.12% Liquid 15 milliLiter(s) Oral Mucosa every 12 hours  chlorhexidine 2% Cloths 1 Application(s) Topical daily  CRRT Treatment    <Continuous>  heparin  Infusion.  Unit(s)/Hr IV Continuous <Continuous>  hydrocortisone sodium succinate Injectable 50 milliGRAM(s) IV Push every 6 hours  norepinephrine Infusion 0.05 MICROgram(s)/kG/Min IV Continuous <Continuous>  pantoprazole  Injectable 40 milliGRAM(s) IV Push daily  petrolatum Ophthalmic Ointment 1 Application(s) Both EYES daily  phenylephrine    Infusion 1.001 MICROgram(s)/kG/Min IV Continuous <Continuous>  piperacillin/tazobactam IVPB.. 4.5 Gram(s) IV Intermittent every 8 hours  PrismaSATE Dialysate BK 0 / 3.5 5000 milliLiter(s) CRRT <Continuous>  PrismaSOL Filtration BGK 0 / 2.5 5000 milliLiter(s) CRRT <Continuous>  PrismaSOL Filtration BGK 0 / 2.5 5000 milliLiter(s) CRRT <Continuous>  propofol Infusion 30 MICROgram(s)/kG/Min IV Continuous <Continuous>  sodium bicarbonate  Infusion 0.167 mEq/kG/Hr IV Continuous <Continuous>  vancomycin  IVPB 1000 milliGRAM(s) IV Intermittent every 12 hours  vasopressin Infusion 0.08 Unit(s)/Min IV Continuous <Continuous>    PRN Inpatient Medications  heparin   Injectable 7000 Unit(s) IV Push every 6 hours PRN  heparin   Injectable 3500 Unit(s) IV Push every 6 hours PRN      REVIEW OF SYSTEMS  --------------------------------------------------------------------------------  Unable to obtain ROS as pt. is intubated    VITALS/PHYSICAL EXAM  --------------------------------------------------------------------------------  T(C): 33.4 (09-21-23 @ 08:00), Max: 36.7 (09-20-23 @ 16:20)  HR: 90 (09-21-23 @ 10:00) (87 - 132)  BP: 124/73 (09-20-23 @ 16:25) (90/42 - 132/114)  RR: 28 (09-21-23 @ 10:00) (17 - 28)  SpO2: 100% (09-21-23 @ 10:00) (98% - 100%)  Wt(kg): --    Weight (kg): 90 (09-20-23 @ 16:48)    09-20-23 @ 07:01  -  09-21-23 @ 07:00  --------------------------------------------------------  IN: 4288.1 mL / OUT: 2532 mL / NET: 1756.1 mL    09-21-23 @ 07:01  -  09-21-23 @ 10:49  --------------------------------------------------------  IN: 406.7 mL / OUT: 0 mL / NET: 406.7 mL    Physical Exam:  Gen: ill appearing  HEENT: +ET tube  Pulm: Mechanical breath sounds B/L  CV: S1S2+  Abd: Soft, +BS   Ext: +B/L LE edema  Neuro: Sedated  Skin: Dry  Vascular access: Right IJ non-tunneled HD catheter    LABS/STUDIES  --------------------------------------------------------------------------------              7.3    41.06 >-----------<  155      [09-21-23 @ 05:15]              23.9     136  |  98  |  54  ----------------------------<  160      [09-21-23 @ 05:15]  4.5   |  9   |  2.23        Ca     8.3     [09-21-23 @ 05:15]      Mg     2.60     [09-21-23 @ 05:15]      Phos  6.0     [09-21-23 @ 05:15]    TPro  6.0  /  Alb  1.9  /  TBili  1.3  /  DBili  x   /  AST  335  /  ALT  102  /  AlkPhos  827  [09-21-23 @ 05:15]    PT/INR: PT 27.9 , INR 2.54       [09-21-23 @ 05:15]  PTT: >200.0      [09-21-23 @ 05:15]    Creatinine Trend:  SCr 2.23 [09-21 @ 05:15]  SCr 2.88 [09-21 @ 00:15]  SCr 3.83 [09-20 @ 18:37]  SCr 4.11 [09-20 @ 16:20]  SCr 3.63 [09-20 @ 06:47]    Lipid: chol 136, , HDL 26, LDL --      [09-19-23 @ 08:06] [facility] [place] [facility] Division of Kidney Diseases & Hypertension  FOLLOW UP NOTE  [phone]--------------------------------------------------------------------------------    HPI: 58-year-old female with metastatic cancer of unknown primary etiology, HTN, and DVT initially presented with bloody diarrhea on 9/18/23, admitted for management of sepsis. Hospital course complicated by MESHA, hyperkalemia and metabolic acidosis. Pt. had cardiac arrest yesterday (9/20/23), transferred to MICU. Pt. being seen for MESHA. On review of [place], Scr was 0.97 on 7/26/23. Scr increased to 4.70 on previous hospital admission (9/6/23), and decreased to 1.77 on hospital discharge (9/14/23). Scr initially during current admission was elevated at 2.02 (9/18/23), and subsequently trended up to 4.11 on 9/20/23. Pt. was initiated on CRRT on 9/20/23 for worsening renal function,  hyperkalemia and metabolic acidosis.    24 hour events/subjective: Pt. seen and evaluated with  present in MICU. Pt. remains intubated, sedated, and requiring IV vasopressor support. Pt. unable to tolerate fluid removal with CRRT.     PAST HISTORY  --------------------------------------------------------------------------------  No significant changes to PMH, PSH, FHx, SHx, unless otherwise noted    ALLERGIES & MEDICATIONS  --------------------------------------------------------------------------------  Allergies    No Known Allergies    Intolerances    Standing Inpatient Medications  allopurinol 100 milliGRAM(s) Oral daily  chlorhexidine 0.12% Liquid 15 milliLiter(s) Oral Mucosa every 12 hours  chlorhexidine 2% Cloths 1 Application(s) Topical daily  CRRT Treatment    <Continuous>  heparin  Infusion.  Unit(s)/Hr IV Continuous <Continuous>  hydrocortisone sodium succinate Injectable 50 milliGRAM(s) IV Push every 6 hours  norepinephrine Infusion 0.05 MICROgram(s)/kG/Min IV Continuous <Continuous>  pantoprazole  Injectable 40 milliGRAM(s) IV Push daily  petrolatum Ophthalmic Ointment 1 Application(s) Both EYES daily  phenylephrine    Infusion 1.001 MICROgram(s)/kG/Min IV Continuous <Continuous>  piperacillin/tazobactam IVPB.. 4.5 Gram(s) IV Intermittent every 8 hours  PrismaSATE Dialysate BK 0 / 3.5 5000 milliLiter(s) CRRT <Continuous>  PrismaSOL Filtration BGK 0 / 2.5 5000 milliLiter(s) CRRT <Continuous>  PrismaSOL Filtration BGK 0 / 2.5 5000 milliLiter(s) CRRT <Continuous>  propofol Infusion 30 MICROgram(s)/kG/Min IV Continuous <Continuous>  sodium bicarbonate  Infusion 0.167 mEq/kG/Hr IV Continuous <Continuous>  vancomycin  IVPB 1000 milliGRAM(s) IV Intermittent every 12 hours  vasopressin Infusion 0.08 Unit(s)/Min IV Continuous <Continuous>    PRN Inpatient Medications  heparin   Injectable 7000 Unit(s) IV Push every 6 hours PRN  heparin   Injectable 3500 Unit(s) IV Push every 6 hours PRN    REVIEW OF SYSTEMS  --------------------------------------------------------------------------------  Unable to obtain ROS as pt. is intubated    VITALS/PHYSICAL EXAM  --------------------------------------------------------------------------------  T(C): 33.4 (09-21-23 @ 08:00), Max: 36.7 (09-20-23 @ 16:20)  HR: 90 (09-21-23 @ 10:00) (87 - 132)  BP: 124/73 (09-20-23 @ 16:25) (90/42 - 132/114)  RR: 28 (09-21-23 @ 10:00) (17 - 28)  SpO2: 100% (09-21-23 @ 10:00) (98% - 100%)  Wt(kg): --    Weight (kg): 90 (09-20-23 @ 16:48)    09-20-23 @ 07:01  -  09-21-23 @ 07:00  --------------------------------------------------------  IN: 4288.1 mL / OUT: 2532 mL / NET: 1756.1 mL    09-21-23 @ 07:01  -  09-21-23 @ 10:49  --------------------------------------------------------  IN: 406.7 mL / OUT: 0 mL / NET: 406.7 mL    Physical Exam:  Gen: ill appearing  HEENT: +ET tube  Pulm: Mechanical breath sounds B/L  CV: S1S2+  Abd: Soft, +BS   Ext: +B/L LE edema  Neuro: Sedated  Skin: Dry  Vascular access: Right IJ non-tunneled HD catheter being used for CRRT    LABS/STUDIES  --------------------------------------------------------------------------------              7.3    41.06 >-----------<  155      [09-21-23 @ 05:15]              23.9     136  |  98  |  54  ----------------------------<  160      [09-21-23 @ 05:15]  4.5   |  9   |  2.23        Ca     8.3     [09-21-23 @ 05:15]      Mg     2.60     [09-21-23 @ 05:15]      Phos  6.0     [09-21-23 @ 05:15]    TPro  6.0  /  Alb  1.9  /  TBili  1.3  /  DBili  x   /  AST  335  /  ALT  102  /  AlkPhos  827  [09-21-23 @ 05:15]    Creatinine Trend:  SCr 2.23 [09-21 @ 05:15]  SCr 2.88 [09-21 @ 00:15]  SCr 3.83 [09-20 @ 18:37]  SCr 4.11 [09-20 @ 16:20]  SCr 3.63 [09-20 @ 06:47]

## 2023-09-21 NOTE — PROGRESS NOTE ADULT - PROBLEM SELECTOR PLAN 3
Pt. with metabolic acidosis in the setting of lactic acidosis. pH is low at 7.18, SCO2 is low at 9, and serum lactic acid level is significantly elevated at 14.2. Plan to continue with CRRT today, as outlined above. Monitor SCO2.    Overall prognosis is poor.    If you have any questions, please feel free to contact me  Je Goodman  Nephrology Fellow  [phone] / [employer] Teams(Preferred)  (After 5pm or on weekends please page the on-call fellow) Pt. with metabolic acidosis in the setting of lactic acidosis. pH is low at 7.18, SCO2 is low at 9, and serum lactic acid level is significantly elevated at 14.2. Plan to continue with CRRT today, as outlined above. Monitor SCO2.    Overall prognosis poor.    If you have any questions, please feel free to contact me  Je Goodman  Nephrology Fellow  [phone] / [employer] Teams(Preferred)  (After 5pm or on weekends please page the on-call fellow)

## 2023-09-21 NOTE — PROGRESS NOTE ADULT - PROBLEM SELECTOR PLAN 1
Pt. with MESHA in the setting of sepsis, hypotension, and cardiac arrest. On review of [place], Scr was 0.97 on 7/26/23. Scr increased to 4.70 on recent hospital admission on 9/6/23, decreased to 1.77 on hospital discharge (9/14). Scr initially during current admission was elevated at 2.02 (9/18), and subsequently trended up to 4.11 on 9/20. Pt. was initiated on CRRT on 9/20. Pt. is oliguric, documented urine output is 235 cc over the past 24 hours. UA shows proteinuria, ketonuria, and evidence of infection. Pt. currently hemodynamically unstable, requiring IV vasopressor support. Labs reviewed. Pt. with likely ATN. Plan to continue with CRRT If in line with goals of care. Monitor labs and urine output. Avoid nephrotoxins. Dose medications to CRRT. Pt. with oliguric MESHA in the setting of sepsis, hypotension, and cardiac arrest. Pt. with most likely ATN. On review of [place], Scr was 0.97 on 7/26/23. Scr increased to 4.70 on previous hospital admission on 9/6/23, decreased to 1.77 on hospital discharge (9/14). Scr initially during current admission was elevated at 2.02 (9/18/23), and subsequently trended up to 4.11 on 9/20/23. Pt. was initiated on CRRT on 9/20/23. Pt. currently hemodynamically unstable, requiring IV vasopressor support. Continue with CRRT as per discussion with MICU team. Monitor labs and urine output. Avoid nephrotoxins. Dose medications to CRRT.

## 2023-09-21 NOTE — PATIENT PROFILE ADULT - FUNCTIONAL ASSESSMENT - DAILY ACTIVITY 2.
SUBJECTIVE:   CC: Dimitry Fisher is an 40 year old male who presents for preventive health visit.     Healthy Habits:    Do you get at least three servings of calcium containing foods daily (dairy, green leafy vegetables, etc.)? yes    Amount of exercise or daily activities, outside of work: 0 day(s) per week    Problems taking medications regularly No    Medication side effects: No    Have you had an eye exam in the past two years? yes    Do you see a dentist twice per year? no    Do you have sleep apnea, excessive snoring or daytime drowsiness?no      Hyperlipidemia Follow-Up      Rate your low fat/cholesterol diet?: poor    Taking statin?  No    Other lipid medications/supplements?:  none    Hypertension Follow-up      Outpatient blood pressures are not being checked.    Low Salt Diet: not monitoring salt  Stable.  Depression and Anxiety Follow-Up    Status since last visit: stable refill medication today    Other associated symptoms:None    Complicating factors:     Significant life event: No     Current substance abuse: None  Mood. Doing well and wants to continue his lexapro.   PHQ 7/18/2016 1/12/2018 7/27/2018   PHQ-9 Total Score 8 3 4   Q9: Suicide Ideation Not at all Not at all Not at all     MANDI-7 SCORE 7/18/2016 1/12/2018 7/27/2018   Total Score - - -   Total Score 5 0 2     In the past two weeks have you had thoughts of suicide or self-harm?  No.    Do you have concerns about your personal safety or the safety of others?   No  PHQ-9  English  PHQ-9   Any Language  MANDI-7  Suicide Assessment Five-step Evaluation and Treatment (SAFE-T)    Today's PHQ-2 Score:   PHQ-2 ( 1999 [employer]) 12/18/2015 6/18/2015   Q1: Little interest or pleasure in doing things 0 1   Q2: Feeling down, depressed or hopeless 0 1   PHQ-2 Score 0 2       Abuse: Current or Past(Physical, Sexual or Emotional)- No  Do you feel safe in your environment? Yes    Social History     Tobacco Use     Smoking status: Never Smoker     Smokeless  tobacco: Never Used   Substance Use Topics     Alcohol use: No     Comment: Past few years has stopped drinking altogether.  Max drinking was 3-4 drinks, 3-4 times a week     If you drink alcohol do you typically have >3 drinks per day or >7 drinks per week? Not Applicable                      Last PSA: No results found for: PSA    Reviewed orders with patient. Reviewed health maintenance and updated orders accordingly - Yes      Reviewed and updated as needed this visit by clinical staff  Tobacco  Allergies  Meds         Reviewed and updated as needed this visit by Provider        Past Medical History:   Diagnosis Date     H/o Lyme disease     6 months ago, had EM rash- was treated with Abx, no formal testing.       Nephrolithiasis 2/4/2013    Patient reports a past history of kidney stones, and at that time he was treated with lithotrypsy in ~2008 in [facility].  He never had stone analysis.        Obesity 2/4/2013    He is currently at his max lifetime weight, 278lbs.  He was 180s in HS.  He endorses 20lbs weight gain in past few years.  He is sedentary, working at a computer all day.  He describes his job as moderately stressful.         No past surgical history on file.    ROS:  CONSTITUTIONAL: NEGATIVE for fever, chills, change in weight  INTEGUMENTARY/SKIN: NEGATIVE for worrisome rashes, moles or lesions  EYES: NEGATIVE for vision changes or irritation  ENT: NEGATIVE for ear, mouth and throat problems  RESP: NEGATIVE for significant cough or SOB  CV: NEGATIVE for chest pain, palpitations or peripheral edema  GI: NEGATIVE for nausea, abdominal pain, heartburn, or change in bowel habits   male: negative for dysuria, hematuria, decreased urinary stream, erectile dysfunction, urethral discharge  MUSCULOSKELETAL: NEGATIVE for significant arthralgias or myalgia  NEURO: NEGATIVE for weakness, dizziness or paresthesias  PSYCHIATRIC: NEGATIVE for changes in mood or affect    OBJECTIVE:   /86   Pulse 76    Temp 97.7  F (36.5  C) (Tympanic)   Resp 16   Ht 1.829 m (6')   Wt 137 kg (302 lb)   SpO2 96%   BMI 40.96 kg/m    EXAM:  GENERAL: healthy, alert and no distress  EYES: Eyes grossly normal to inspection, PERRL and conjunctivae and sclerae normal  HENT: ear canals and TM's normal, nose and mouth without ulcers or lesions  NECK: no adenopathy, no asymmetry, masses, or scars and thyroid normal to palpation  RESP: lungs clear to auscultation - no rales, rhonchi or wheezes  CV: regular rate and rhythm, normal S1 S2, no S3 or S4, no murmur, click or rub, no peripheral edema and peripheral pulses strong  ABDOMEN: soft, nontender, no hepatosplenomegaly, no masses and bowel sounds normal  MS: no gross musculoskeletal defects noted, no edema  SKIN: no suspicious lesions or rashes  NEURO: Normal strength and tone, mentation intact and speech normal  PSYCH: mentation appears normal, affect normal/bright        ASSESSMENT/PLAN:       ICD-10-CM    1. Routine general medical examination at a health care facility Z00.00    2. Dysthymia F34.1 escitalopram (LEXAPRO) 10 MG tablet   3. Essential hypertension with goal blood pressure less than 140/90 I10 hydrochlorothiazide (HYDRODIURIL) 25 MG tablet     losartan (COZAAR) 50 MG tablet     Basic metabolic panel  (Ca, Cl, CO2, Creat, Gluc, K, Na, BUN)   4. CARDIOVASCULAR SCREENING; LDL GOAL LESS THAN 130 Z13.6 Lipid panel reflex to direct LDL Fasting       COUNSELING:  Reviewed preventive health counseling, as reflected in patient instructions       Regular exercise       Healthy diet/nutrition    BP Readings from Last 1 Encounters:   03/04/19 136/86     Estimated body mass index is 40.96 kg/m  as calculated from the following:    Height as of this encounter: 1.829 m (6').    Weight as of this encounter: 137 kg (302 lb).      Weight management plan: Discussed healthy diet and exercise guidelines     reports that  has never smoked. he has never used smokeless tobacco.      Counseling  Resources:  ATP IV Guidelines  Pooled Cohorts Equation Calculator  FRAX Risk Assessment  ICSI Preventive Guidelines  Dietary Guidelines for Americans, 2010  USDA's MyPlate  ASA Prophylaxis  Lung CA Screening    Adán Arizmendi PA-C  [facility]   4 = No assist / stand by assistance

## 2023-09-21 NOTE — CHART NOTE - NSCHARTNOTEFT_GEN_A_CORE
: Benjie May, PGY-1    INDICATION: Critical MS    PROCEDURE:  [X] LIMITED ECHO  [X] LIMITED CHEST  [ ] LIMITED RETROPERITONEAL  [X] LIMITED ABDOMINAL  [ ] LIMITED DVT  [ ] NEEDLE GUIDANCE VASCULAR  [ ] NEEDLE GUIDANCE THORACENTESIS  [ ] NEEDLE GUIDANCE PARACENTESIS  [ ] NEEDLE GUIDANCE PERICARDIOCENTESIS  [ ] OTHER    FINDINGS: A-lines visualized bilaterally. Thrombus visualized in the RV and LV apical wall flattening with diastole. Simple fluid visualized around Gupta's pouch.     INTERPRETATION: Grossly normal lung function. Stable thrombus in the RV known from prior history and : Benjie May, PGY-1    INDICATION: Critical MS    PROCEDURE:  [X] LIMITED ECHO  [X] LIMITED CHEST  [ ] LIMITED RETROPERITONEAL  [X] LIMITED ABDOMINAL  [ ] LIMITED DVT  [ ] NEEDLE GUIDANCE VASCULAR  [ ] NEEDLE GUIDANCE THORACENTESIS  [ ] NEEDLE GUIDANCE PARACENTESIS  [ ] NEEDLE GUIDANCE PERICARDIOCENTESIS  [ ] OTHER    FINDINGS: A-lines visualized bilaterally. Thrombus visualized in the RV and LV apical wall flattening with diastole. Simple fluid visualized around Gupta's pouch.     INTERPRETATION: Grossly normal lung function. Stable thrombus in the RV known from prior history      Attending Addendum:  At bedside for procedure and agree with above.  SERGEY Stringer DO, [facility]

## 2023-09-21 NOTE — CHART NOTE - NSCHARTNOTEFT_GEN_A_CORE
Spoke to Cardiac surgery on call Dr. Hardy ([phone]) for [facility] to evaluate for intervention for new RV mass seen on ECHO potentially contributing to obstructive shock. With a diagnosis of metastatic cancer and in the setting of shock requiring multiple pressors she would not qualify for any retrieval or open procedure, as the mass likely represents a metastatic lesion.

## 2023-09-21 NOTE — PROGRESS NOTE ADULT - ATTENDING COMMENTS
Pt. with oliguric MESHA, currently on CRRT/CVVHDF. Pt. tolerating CRRT/CVVHDF during rounds. HD catheter functioning well. BP being maintained on IV vasopressor therapy. Monitor labs and urine output. Avoid any potential nephrotoxins. Dose medications as per eGFR. Overall prognosis poor. Pt. with oliguric MESHA, currently on CRRT/CVVHDF. Pt. tolerating CRRT/CVVHDF during rounds. Pt. with hypotension, BP being maintained on IV vasopressor therapy. Monitor labs and urine output. Avoid any potential nephrotoxins. Dose medications as per eGFR. Overall prognosis poor.

## 2023-09-21 NOTE — PATIENT PROFILE ADULT - FALL HARM RISK - HARM RISK INTERVENTIONS

## 2023-09-21 NOTE — PROGRESS NOTE ADULT - PROBLEM SELECTOR PLAN 2
Pt. with hyperkalemia in the setting of MESHA and metabolic acidosis. Serum potassium improved to 4.5 today. Plan to continue CRRT, as outlined above. Monitor serum potassium.

## 2023-09-21 NOTE — PROGRESS NOTE ADULT - SUBJECTIVE AND OBJECTIVE BOX
==============UNFINISHED NOTE==================        ==============UNFINISHED NOTE==================   HPI: Patient is a 58y old  Female with history of unknown metastatic cancer with colon and pancreas masses and DVT on Eloquis, who presents with a chief complaint of Diarrhea, blood in stool (19 Sep 2023 10:41)    INTERVAL HPI/OVERNIGHT EVENTS: Patient on vasopressors     SUBJECTIVE: Patient seen and examined at bedside. Pt sedated on ventilator and CRRT.     ROS: All negative except as listed above.    VITAL SIGNS:  ICU Vital Signs Last 24 Hrs  T(C): 33.4 (21 Sep 2023 08:00), Max: 36.7 (20 Sep 2023 16:20)  T(F): 92.2 (21 Sep 2023 08:00), Max: 98 (20 Sep 2023 16:20)  HR: 94 (21 Sep 2023 12:00) (87 - 121)  BP: 124/73 (20 Sep 2023 16:25) (108/70 - 132/114)  BP(mean): 89 (20 Sep 2023 16:25) (82 - 122)  ABP: 98/69 (21 Sep 2023 12:00) (79/63 - 134/83)  ABP(mean): 82 (21 Sep 2023 12:00) (65 - 103)  RR: 25 (21 Sep 2023 12:00) (20 - 28)  SpO2: 92% (21 Sep 2023 12:00) (92% - 100%)    O2 Parameters below as of 21 Sep 2023 08:00  Patient On (Oxygen Delivery Method): ventilator    O2 Concentration (%): 40      Mode: AC/ CMV (Assist Control/ Continuous Mandatory Ventilation), RR (machine): 28, TV (machine): 450, FiO2: 30, PEEP: 5, ITime: 0.7, MAP: 8, PIP: 18  Plateau pressure:   P/F ratio:     09-20 @ 07:01  -  09-21 @ 07:00  --------------------------------------------------------  IN: 4288.1 mL / OUT: 2532 mL / NET: 1756.1 mL    09-21 @ 07:01  -  09-21 @ 13:20  --------------------------------------------------------  IN: 2308.2 mL / OUT: 0 mL / NET: 2308.2 mL      CAPILLARY BLOOD GLUCOSE      POCT Blood Glucose.: 103 mg/dL (20 Sep 2023 18:35)      ECG: reviewed.    PHYSICAL EXAM:    GENERAL: NAD, lying in bed comfortably  HEAD:  Atraumatic, normocephalic  EYES: EOMI, PERRLA, conjunctiva and sclera clear  NECK: Supple, trachea midline, no JVD  HEART: Regular rate and rhythm, no murmurs, rubs, or gallops  LUNGS: Unlabored respirations.  Clear to auscultation bilaterally, no crackles, wheezing, or rhonchi  ABDOMEN: Soft, nontender, nondistended, +BS  EXTREMITIES: 2+ peripheral pulses bilaterally, cap refill<2 secs. No clubbing, cyanosis, or edema  NERVOUS SYSTEM:  A&Ox3, following commands, moving all extremities, no focal deficits   SKIN: No rashes or lesions    MEDICATIONS:  MEDICATIONS  (STANDING):  chlorhexidine 0.12% Liquid 15 milliLiter(s) Oral Mucosa every 12 hours  chlorhexidine 2% Cloths 1 Application(s) Topical daily  CRRT Treatment    <Continuous>  heparin  Infusion.  Unit(s)/Hr (16 mL/Hr) IV Continuous <Continuous>  norepinephrine Infusion 0.05 MICROgram(s)/kG/Min (4.22 mL/Hr) IV Continuous <Continuous>  pantoprazole  Injectable 40 milliGRAM(s) IV Push daily  petrolatum Ophthalmic Ointment 1 Application(s) Both EYES daily  phenylephrine    Infusion 1.001 MICROgram(s)/kG/Min (16.9 mL/Hr) IV Continuous <Continuous>  piperacillin/tazobactam IVPB.. 4.5 Gram(s) IV Intermittent every 8 hours  PrismaSATE Dialysate BK 0 / 3.5 5000 milliLiter(s) (1600 mL/Hr) CRRT <Continuous>  PrismaSOL Filtration BGK 0 / 2.5 5000 milliLiter(s) (200 mL/Hr) CRRT <Continuous>  PrismaSOL Filtration BGK 0 / 2.5 5000 milliLiter(s) (1400 mL/Hr) CRRT <Continuous>  propofol Infusion 30 MICROgram(s)/kG/Min (16.2 mL/Hr) IV Continuous <Continuous>  sodium bicarbonate  Infusion 0.167 mEq/kG/Hr (100 mL/Hr) IV Continuous <Continuous>  vancomycin  IVPB 1000 milliGRAM(s) IV Intermittent every 12 hours  vasopressin Infusion 0.08 Unit(s)/Min (12 mL/Hr) IV Continuous <Continuous>    MEDICATIONS  (PRN):  heparin   Injectable 7000 Unit(s) IV Push every 6 hours PRN For aPTT less than 40  heparin   Injectable 3500 Unit(s) IV Push every 6 hours PRN For aPTT between 40 - 57      ALLERGIES:  Allergies    No Known Allergies    Intolerances        LABS:                        6.1    32.60 )-----------( 108      ( 21 Sep 2023 11:46 )             20.0     09-21    137  |  99  |  40<H>  ----------------------------<  166<H>  4.1   |  11<L>  |  1.76<H>    Ca    7.7<L>      21 Sep 2023 11:46  Phos  5.4     09-21  Mg     2.20     09-21    TPro  5.1<L>  /  Alb  1.9<L>  /  TBili  1.6<H>  /  DBili  x   /  AST  437<H>  /  ALT  123<H>  /  AlkPhos  665<H>  09-21    PT/INR - ( 21 Sep 2023 05:15 )   PT: 27.9 sec;   INR: 2.54 ratio         PTT - ( 21 Sep 2023 11:46 )  PTT:>200.0 sec  Urinalysis Basic - ( 21 Sep 2023 11:46 )    Color: x / Appearance: x / SG: x / pH: x  Gluc: 166 mg/dL / Ketone: x  / Bili: x / Urobili: x   Blood: x / Protein: x / Nitrite: x   Leuk Esterase: x / RBC: x / WBC x   Sq Epi: x / Non Sq Epi: x / Bacteria: x      ABG:  pH, Arterial: 7.22 (09-21-23 @ 11:53)  pCO2, Arterial: 26 mmHg (09-21-23 @ 11:53)  pO2, Arterial: 125 mmHg (09-21-23 @ 11:53)  pH, Arterial: 7.18 (09-21-23 @ 05:15)  pCO2, Arterial: 25 mmHg (09-21-23 @ 05:15)  pO2, Arterial: 170 mmHg (09-21-23 @ 05:15)  pH, Arterial: 7.34 (09-21-23 @ 00:15)  pCO2, Arterial: 24 mmHg (09-21-23 @ 00:15)  pO2, Arterial: 190 mmHg (09-21-23 @ 00:15)  pH, Arterial: 7.25 (09-20-23 @ 17:01)  pCO2, Arterial: 27 mmHg (09-20-23 @ 17:01)  pO2, Arterial: 347 mmHg (09-20-23 @ 17:01)      vBG:  pH, Venous: 7.20 (09-20-23 @ 17:10)  pCO2, Venous: 33 mmHg (09-20-23 @ 17:10)  pO2, Venous: 70 mmHg (09-20-23 @ 17:10)  HCO3, Venous: 13 mmol/L (09-20-23 @ 17:10)  pH, Venous: 7.11 (09-20-23 @ 16:29)  pCO2, Venous: 42 mmHg (09-20-23 @ 16:29)  pO2, Venous: 56 mmHg (09-20-23 @ 16:29)  HCO3, Venous: 13 mmol/L (09-20-23 @ 16:29)    Micro:    Culture - Blood (collected 09-18-23 @ 14:55)  Source: .Blood Blood-Peripheral  Preliminary Report (09-20-23 @ 19:01):    No growth at 48 Hours    Culture - Blood (collected 09-18-23 @ 14:52)  Source: .Blood Blood-Peripheral  Preliminary Report (09-20-23 @ 19:01):    No growth at 48 Hours    Culture - Blood (collected 09-07-23 @ 06:39)  Source: .Blood Blood-Peripheral  Final Report (09-12-23 @ 09:00):    No growth at 5 days    Culture - Blood (collected 09-07-23 @ 06:28)  Source: .Blood Blood-Peripheral  Final Report (09-12-23 @ 09:00):    No growth at 5 days          RADIOLOGY & ADDITIONAL TESTS: Reviewed.     ==============UNFINISHED NOTE==================   HPI: Patient is a 58y old  Female with history of unknown metastatic cancer with colon and pancreas masses and DVT on Eloquis, who presents with a chief complaint of Diarrhea, blood in stool (19 Sep 2023 10:41)    INTERVAL HPI/OVERNIGHT EVENTS: Patient requiring vasopressor support with Levophed 4.22mL/Hr, phenylephrine 16.9mL/Hr, and vasopressin 12mL/Hr. Metabolic acidosis worsening despite CRRT and bicarbonate infusion.     SUBJECTIVE: Patient seen and examined at bedside. Patient on ventilator and sedated on propofol; therefore, patient unable to participate in interview. Pt on CRRT.     ROS: All negative except as listed above.    VITAL SIGNS:  ICU Vital Signs Last 24 Hrs  T(C): 33.4 (21 Sep 2023 08:00), Max: 36.7 (20 Sep 2023 16:20)  T(F): 92.2 (21 Sep 2023 08:00), Max: 98 (20 Sep 2023 16:20)  HR: 94 (21 Sep 2023 12:00) (87 - 121)  BP: 124/73 (20 Sep 2023 16:25) (108/70 - 132/114)  BP(mean): 89 (20 Sep 2023 16:25) (82 - 122)  ABP: 98/69 (21 Sep 2023 12:00) (79/63 - 134/83)  ABP(mean): 82 (21 Sep 2023 12:00) (65 - 103)  RR: 25 (21 Sep 2023 12:00) (20 - 28)  SpO2: 92% (21 Sep 2023 12:00) (92% - 100%)    O2 Parameters below as of 21 Sep 2023 08:00  Patient On (Oxygen Delivery Method): ventilator    O2 Concentration (%): 40      Mode: AC/ CMV (Assist Control/ Continuous Mandatory Ventilation), RR (machine): 28, TV (machine): 450, FiO2: 30, PEEP: 5, ITime: 0.7, MAP: 8, PIP: 18  Plateau pressure:   P/F ratio:     09-20 @ 07:01  -  09-21 @ 07:00  --------------------------------------------------------  IN: 4288.1 mL / OUT: 2532 mL / NET: 1756.1 mL    09-21 @ 07:01  - 09-21 @ 13:20  --------------------------------------------------------  IN: 2308.2 mL / OUT: 0 mL / NET: 2308.2 mL      CAPILLARY BLOOD GLUCOSE      POCT Blood Glucose.: 103 mg/dL (20 Sep 2023 18:35)      ECG: reviewed.    PHYSICAL EXAM:    GENERAL: NAD, lying in bed comfortably  HEAD:  Atraumatic, normocephalic  EYES: EOMI, PERRLA, conjunctiva and sclera clear  NECK: Supple, trachea midline, no JVD  HEART: Regular rate and rhythm, no murmurs, rubs, or gallops  LUNGS: Unlabored respirations.  Clear to auscultation bilaterally, no crackles, wheezing, or rhonchi  ABDOMEN: Soft, nontender, nondistended, +BS  EXTREMITIES: 2+ peripheral pulses bilaterally, cap refill<2 secs. No clubbing, cyanosis, or edema  NERVOUS SYSTEM:  A&Ox3, following commands, moving all extremities, no focal deficits   SKIN: No rashes or lesions    MEDICATIONS:  MEDICATIONS  (STANDING):  chlorhexidine 0.12% Liquid 15 milliLiter(s) Oral Mucosa every 12 hours  chlorhexidine 2% Cloths 1 Application(s) Topical daily  CRRT Treatment    <Continuous>  heparin  Infusion.  Unit(s)/Hr (16 mL/Hr) IV Continuous <Continuous>  norepinephrine Infusion 0.05 MICROgram(s)/kG/Min (4.22 mL/Hr) IV Continuous <Continuous>  pantoprazole  Injectable 40 milliGRAM(s) IV Push daily  petrolatum Ophthalmic Ointment 1 Application(s) Both EYES daily  phenylephrine    Infusion 1.001 MICROgram(s)/kG/Min (16.9 mL/Hr) IV Continuous <Continuous>  piperacillin/tazobactam IVPB.. 4.5 Gram(s) IV Intermittent every 8 hours  PrismaSATE Dialysate BK 0 / 3.5 5000 milliLiter(s) (1600 mL/Hr) CRRT <Continuous>  PrismaSOL Filtration BGK 0 / 2.5 5000 milliLiter(s) (200 mL/Hr) CRRT <Continuous>  PrismaSOL Filtration BGK 0 / 2.5 5000 milliLiter(s) (1400 mL/Hr) CRRT <Continuous>  propofol Infusion 30 MICROgram(s)/kG/Min (16.2 mL/Hr) IV Continuous <Continuous>  sodium bicarbonate  Infusion 0.167 mEq/kG/Hr (100 mL/Hr) IV Continuous <Continuous>  vancomycin  IVPB 1000 milliGRAM(s) IV Intermittent every 12 hours  vasopressin Infusion 0.08 Unit(s)/Min (12 mL/Hr) IV Continuous <Continuous>    MEDICATIONS  (PRN):  heparin   Injectable 7000 Unit(s) IV Push every 6 hours PRN For aPTT less than 40  heparin   Injectable 3500 Unit(s) IV Push every 6 hours PRN For aPTT between 40 - 57      ALLERGIES:  Allergies    No Known Allergies    Intolerances        LABS:                        6.1    32.60 )-----------( 108      ( 21 Sep 2023 11:46 )             20.0     09-21    137  |  99  |  40<H>  ----------------------------<  166<H>  4.1   |  11<L>  |  1.76<H>    Ca    7.7<L>      21 Sep 2023 11:46  Phos  5.4     09-21  Mg     2.20     09-21    TPro  5.1<L>  /  Alb  1.9<L>  /  TBili  1.6<H>  /  DBili  x   /  AST  437<H>  /  ALT  123<H>  /  AlkPhos  665<H>  09-21    PT/INR - ( 21 Sep 2023 05:15 )   PT: 27.9 sec;   INR: 2.54 ratio         PTT - ( 21 Sep 2023 11:46 )  PTT:>200.0 sec  Urinalysis Basic - ( 21 Sep 2023 11:46 )    Color: x / Appearance: x / SG: x / pH: x  Gluc: 166 mg/dL / Ketone: x  / Bili: x / Urobili: x   Blood: x / Protein: x / Nitrite: x   Leuk Esterase: x / RBC: x / WBC x   Sq Epi: x / Non Sq Epi: x / Bacteria: x      ABG:  pH, Arterial: 7.22 (09-21-23 @ 11:53)  pCO2, Arterial: 26 mmHg (09-21-23 @ 11:53)  pO2, Arterial: 125 mmHg (09-21-23 @ 11:53)  pH, Arterial: 7.18 (09-21-23 @ 05:15)  pCO2, Arterial: 25 mmHg (09-21-23 @ 05:15)  pO2, Arterial: 170 mmHg (09-21-23 @ 05:15)  pH, Arterial: 7.34 (09-21-23 @ 00:15)  pCO2, Arterial: 24 mmHg (09-21-23 @ 00:15)  pO2, Arterial: 190 mmHg (09-21-23 @ 00:15)  pH, Arterial: 7.25 (09-20-23 @ 17:01)  pCO2, Arterial: 27 mmHg (09-20-23 @ 17:01)  pO2, Arterial: 347 mmHg (09-20-23 @ 17:01)      vBG:  pH, Venous: 7.20 (09-20-23 @ 17:10)  pCO2, Venous: 33 mmHg (09-20-23 @ 17:10)  pO2, Venous: 70 mmHg (09-20-23 @ 17:10)  HCO3, Venous: 13 mmol/L (09-20-23 @ 17:10)  pH, Venous: 7.11 (09-20-23 @ 16:29)  pCO2, Venous: 42 mmHg (09-20-23 @ 16:29)  pO2, Venous: 56 mmHg (09-20-23 @ 16:29)  HCO3, Venous: 13 mmol/L (09-20-23 @ 16:29)    Micro:    Culture - Blood (collected 09-18-23 @ 14:55)  Source: .Blood Blood-Peripheral  Preliminary Report (09-20-23 @ 19:01):    No growth at 48 Hours    Culture - Blood (collected 09-18-23 @ 14:52)  Source: .Blood Blood-Peripheral  Preliminary Report (09-20-23 @ 19:01):    No growth at 48 Hours    Culture - Blood (collected 09-07-23 @ 06:39)  Source: .Blood Blood-Peripheral  Final Report (09-12-23 @ 09:00):    No growth at 5 days    Culture - Blood (collected 09-07-23 @ 06:28)  Source: .Blood Blood-Peripheral  Final Report (09-12-23 @ 09:00):    No growth at 5 days          RADIOLOGY & ADDITIONAL TESTS: Reviewed.     ==============UNFINISHED NOTE==================   HPI: Patient is a 58y old  Female with history of unknown metastatic cancer with colon and pancreas masses and DVT on Eloquis, who presents with a chief complaint of Diarrhea, blood in stool (19 Sep 2023 10:41)    INTERVAL HPI/OVERNIGHT EVENTS: Patient requiring IV vasopressor support with Levophed 4.22mL/Hr, phenylephrine 16.9mL/Hr, and vasopressin 12mL/Hr. Metabolic acidosis worsening despite CRRT and bicarbonate infusion.     SUBJECTIVE: Patient seen and examined at bedside with  present in MICU. Patient on ventilator and sedated on propofol; therefore, patient unable to participate in interview. Pt currently on CRRT.     ROS: Unable to assess due to sedation    VITAL SIGNS:  ICU Vital Signs Last 24 Hrs  T(C): 33.4 (21 Sep 2023 08:00), Max: 36.7 (20 Sep 2023 16:20)  T(F): 92.2 (21 Sep 2023 08:00), Max: 98 (20 Sep 2023 16:20)  HR: 94 (21 Sep 2023 12:00) (87 - 121)  BP: 124/73 (20 Sep 2023 16:25) (108/70 - 132/114)  BP(mean): 89 (20 Sep 2023 16:25) (82 - 122)  ABP: 98/69 (21 Sep 2023 12:00) (79/63 - 134/83)  ABP(mean): 82 (21 Sep 2023 12:00) (65 - 103)  RR: 25 (21 Sep 2023 12:00) (20 - 28)  SpO2: 92% (21 Sep 2023 12:00) (92% - 100%)    O2 Parameters below as of 21 Sep 2023 08:00  Patient On (Oxygen Delivery Method): ventilator    O2 Concentration (%): 40      Mode: AC/ CMV (Assist Control/ Continuous Mandatory Ventilation), RR (machine): 28, TV (machine): 450, FiO2: 30, PEEP: 5, ITime: 0.7, MAP: 8, PIP: 18  Plateau pressure:   P/F ratio:     09-20 @ 07:01  -  09-21 @ 07:00  --------------------------------------------------------  IN: 4288.1 mL / OUT: 2532 mL / NET: 1756.1 mL    09-21 @ 07:01  - 09-21 @ 13:20  --------------------------------------------------------  IN: 2308.2 mL / OUT: 0 mL / NET: 2308.2 mL      CAPILLARY BLOOD GLUCOSE      POCT Blood Glucose.: 103 mg/dL (20 Sep 2023 18:35)      ECG: reviewed.    PHYSICAL EXAM:    GENERAL: ill appearing  HEENT: +ET tube  HEART: regular rate and rhythm, no murmurs, rubs, or gallops  LUNGS: mechanical breath sounds b/l.   ABDOMEN: Soft, distended, +BS  EXTREMITIES: bilateral LE edema  NERVOUS SYSTEM: sedated   SKIN: Dry  Vascular access: Right IJ non-tunneled HD catheter, central line, a-line    MEDICATIONS:  MEDICATIONS  (STANDING):  chlorhexidine 0.12% Liquid 15 milliLiter(s) Oral Mucosa every 12 hours  chlorhexidine 2% Cloths 1 Application(s) Topical daily  CRRT Treatment    <Continuous>  heparin  Infusion.  Unit(s)/Hr (16 mL/Hr) IV Continuous <Continuous>  norepinephrine Infusion 0.05 MICROgram(s)/kG/Min (4.22 mL/Hr) IV Continuous <Continuous>  pantoprazole  Injectable 40 milliGRAM(s) IV Push daily  petrolatum Ophthalmic Ointment 1 Application(s) Both EYES daily  phenylephrine    Infusion 1.001 MICROgram(s)/kG/Min (16.9 mL/Hr) IV Continuous <Continuous>  piperacillin/tazobactam IVPB.. 4.5 Gram(s) IV Intermittent every 8 hours  PrismaSATE Dialysate BK 0 / 3.5 5000 milliLiter(s) (1600 mL/Hr) CRRT <Continuous>  PrismaSOL Filtration BGK 0 / 2.5 5000 milliLiter(s) (200 mL/Hr) CRRT <Continuous>  PrismaSOL Filtration BGK 0 / 2.5 5000 milliLiter(s) (1400 mL/Hr) CRRT <Continuous>  propofol Infusion 30 MICROgram(s)/kG/Min (16.2 mL/Hr) IV Continuous <Continuous>  sodium bicarbonate  Infusion 0.167 mEq/kG/Hr (100 mL/Hr) IV Continuous <Continuous>  vancomycin  IVPB 1000 milliGRAM(s) IV Intermittent every 12 hours  vasopressin Infusion 0.08 Unit(s)/Min (12 mL/Hr) IV Continuous <Continuous>    MEDICATIONS  (PRN):  heparin   Injectable 7000 Unit(s) IV Push every 6 hours PRN For aPTT less than 40  heparin   Injectable 3500 Unit(s) IV Push every 6 hours PRN For aPTT between 40 - 57      ALLERGIES:  Allergies    No Known Allergies    Intolerances        LABS:                        6.1    32.60 )-----------( 108      ( 21 Sep 2023 11:46 )             20.0     09-21    137  |  99  |  40<H>  ----------------------------<  166<H>  4.1   |  11<L>  |  1.76<H>    Ca    7.7<L>      21 Sep 2023 11:46  Phos  5.4     09-21  Mg     2.20     09-21    TPro  5.1<L>  /  Alb  1.9<L>  /  TBili  1.6<H>  /  DBili  x   /  AST  437<H>  /  ALT  123<H>  /  AlkPhos  665<H>  09-21    PT/INR - ( 21 Sep 2023 05:15 )   PT: 27.9 sec;   INR: 2.54 ratio         PTT - ( 21 Sep 2023 11:46 )  PTT:>200.0 sec  Urinalysis Basic - ( 21 Sep 2023 11:46 )    Color: x / Appearance: x / SG: x / pH: x  Gluc: 166 mg/dL / Ketone: x  / Bili: x / Urobili: x   Blood: x / Protein: x / Nitrite: x   Leuk Esterase: x / RBC: x / WBC x   Sq Epi: x / Non Sq Epi: x / Bacteria: x      ABG:  pH, Arterial: 7.22 (09-21-23 @ 11:53)  pCO2, Arterial: 26 mmHg (09-21-23 @ 11:53)  pO2, Arterial: 125 mmHg (09-21-23 @ 11:53)  pH, Arterial: 7.18 (09-21-23 @ 05:15)  pCO2, Arterial: 25 mmHg (09-21-23 @ 05:15)  pO2, Arterial: 170 mmHg (09-21-23 @ 05:15)  pH, Arterial: 7.34 (09-21-23 @ 00:15)  pCO2, Arterial: 24 mmHg (09-21-23 @ 00:15)  pO2, Arterial: 190 mmHg (09-21-23 @ 00:15)  pH, Arterial: 7.25 (09-20-23 @ 17:01)  pCO2, Arterial: 27 mmHg (09-20-23 @ 17:01)  pO2, Arterial: 347 mmHg (09-20-23 @ 17:01)      vBG:  pH, Venous: 7.20 (09-20-23 @ 17:10)  pCO2, Venous: 33 mmHg (09-20-23 @ 17:10)  pO2, Venous: 70 mmHg (09-20-23 @ 17:10)  HCO3, Venous: 13 mmol/L (09-20-23 @ 17:10)  pH, Venous: 7.11 (09-20-23 @ 16:29)  pCO2, Venous: 42 mmHg (09-20-23 @ 16:29)  pO2, Venous: 56 mmHg (09-20-23 @ 16:29)  HCO3, Venous: 13 mmol/L (09-20-23 @ 16:29)    Micro:    Culture - Blood (collected 09-18-23 @ 14:55)  Source: .Blood Blood-Peripheral  Preliminary Report (09-20-23 @ 19:01):    No growth at 48 Hours    Culture - Blood (collected 09-18-23 @ 14:52)  Source: .Blood Blood-Peripheral  Preliminary Report (09-20-23 @ 19:01):    No growth at 48 Hours    Culture - Blood (collected 09-07-23 @ 06:39)  Source: .Blood Blood-Peripheral  Final Report (09-12-23 @ 09:00):    No growth at 5 days    Culture - Blood (collected 09-07-23 @ 06:28)  Source: .Blood Blood-Peripheral  Final Report (09-12-23 @ 09:00):    No growth at 5 days          RADIOLOGY & ADDITIONAL TESTS: Reviewed.     ==============UNFINISHED NOTE==================   HPI: Patient is a 58y old  Female with history of unknown metastatic cancer with colon and pancreas masses and DVT on Eloquis, who presents with a chief complaint of Diarrhea, blood in stool (19 Sep 2023 10:41)    INTERVAL HPI/OVERNIGHT EVENTS: Patient requiring IV vasopressor support with Levophed 4.22mL/Hr, phenylephrine 16.9mL/Hr, and vasopressin 12mL/Hr. Metabolic acidosis worsening despite CRRT and bicarbonate infusion.     SUBJECTIVE: Patient seen and examined at bedside with  present in MICU. Patient on ventilator and sedated on propofol; therefore, patient unable to participate in interview. Pt currently on CRRT.     ROS: Unable to assess due to sedation    VITAL SIGNS:  ICU Vital Signs Last 24 Hrs  T(C): 33.4 (21 Sep 2023 08:00), Max: 36.7 (20 Sep 2023 16:20)  T(F): 92.2 (21 Sep 2023 08:00), Max: 98 (20 Sep 2023 16:20)  HR: 94 (21 Sep 2023 12:00) (87 - 121)  BP: 124/73 (20 Sep 2023 16:25) (108/70 - 132/114)  BP(mean): 89 (20 Sep 2023 16:25) (82 - 122)  ABP: 98/69 (21 Sep 2023 12:00) (79/63 - 134/83)  ABP(mean): 82 (21 Sep 2023 12:00) (65 - 103)  RR: 25 (21 Sep 2023 12:00) (20 - 28)  SpO2: 92% (21 Sep 2023 12:00) (92% - 100%)    O2 Parameters below as of 21 Sep 2023 08:00  Patient On (Oxygen Delivery Method): ventilator    O2 Concentration (%): 40      Mode: AC/ CMV (Assist Control/ Continuous Mandatory Ventilation), RR (machine): 28, TV (machine): 450, FiO2: 30, PEEP: 5, ITime: 0.7, MAP: 8, PIP: 18  Plateau pressure:   P/F ratio:     09-20 @ 07:01  -  09-21 @ 07:00  --------------------------------------------------------  IN: 4288.1 mL / OUT: 2532 mL / NET: 1756.1 mL    09-21 @ 07:01  - 09-21 @ 13:20  --------------------------------------------------------  IN: 2308.2 mL / OUT: 0 mL / NET: 2308.2 mL      CAPILLARY BLOOD GLUCOSE      POCT Blood Glucose.: 103 mg/dL (20 Sep 2023 18:35)      ECG: reviewed.    PHYSICAL EXAM:    GENERAL: ill appearing  HEENT: +ET tube  HEART: regular rate and rhythm, no murmurs, rubs, or gallops  LUNGS: mechanical breath sounds b/l.   ABDOMEN: Soft, nondistended, +BS  EXTREMITIES: bilateral LE edema  NERVOUS SYSTEM: sedated   SKIN: Dry  Vascular access: Right IJ non-tunneled HD catheter, central line, a-line    MEDICATIONS:  MEDICATIONS  (STANDING):  chlorhexidine 0.12% Liquid 15 milliLiter(s) Oral Mucosa every 12 hours  chlorhexidine 2% Cloths 1 Application(s) Topical daily  CRRT Treatment    <Continuous>  heparin  Infusion.  Unit(s)/Hr (16 mL/Hr) IV Continuous <Continuous>  norepinephrine Infusion 0.05 MICROgram(s)/kG/Min (4.22 mL/Hr) IV Continuous <Continuous>  pantoprazole  Injectable 40 milliGRAM(s) IV Push daily  petrolatum Ophthalmic Ointment 1 Application(s) Both EYES daily  phenylephrine    Infusion 1.001 MICROgram(s)/kG/Min (16.9 mL/Hr) IV Continuous <Continuous>  piperacillin/tazobactam IVPB.. 4.5 Gram(s) IV Intermittent every 8 hours  PrismaSATE Dialysate BK 0 / 3.5 5000 milliLiter(s) (1600 mL/Hr) CRRT <Continuous>  PrismaSOL Filtration BGK 0 / 2.5 5000 milliLiter(s) (200 mL/Hr) CRRT <Continuous>  PrismaSOL Filtration BGK 0 / 2.5 5000 milliLiter(s) (1400 mL/Hr) CRRT <Continuous>  propofol Infusion 30 MICROgram(s)/kG/Min (16.2 mL/Hr) IV Continuous <Continuous>  sodium bicarbonate  Infusion 0.167 mEq/kG/Hr (100 mL/Hr) IV Continuous <Continuous>  vancomycin  IVPB 1000 milliGRAM(s) IV Intermittent every 12 hours  vasopressin Infusion 0.08 Unit(s)/Min (12 mL/Hr) IV Continuous <Continuous>    MEDICATIONS  (PRN):  heparin   Injectable 7000 Unit(s) IV Push every 6 hours PRN For aPTT less than 40  heparin   Injectable 3500 Unit(s) IV Push every 6 hours PRN For aPTT between 40 - 57      ALLERGIES:  Allergies    No Known Allergies    Intolerances        LABS:                        6.1    32.60 )-----------( 108      ( 21 Sep 2023 11:46 )             20.0     09-21    137  |  99  |  40<H>  ----------------------------<  166<H>  4.1   |  11<L>  |  1.76<H>    Ca    7.7<L>      21 Sep 2023 11:46  Phos  5.4     09-21  Mg     2.20     09-21    TPro  5.1<L>  /  Alb  1.9<L>  /  TBili  1.6<H>  /  DBili  x   /  AST  437<H>  /  ALT  123<H>  /  AlkPhos  665<H>  09-21    PT/INR - ( 21 Sep 2023 05:15 )   PT: 27.9 sec;   INR: 2.54 ratio         PTT - ( 21 Sep 2023 11:46 )  PTT:>200.0 sec  Urinalysis Basic - ( 21 Sep 2023 11:46 )    Color: x / Appearance: x / SG: x / pH: x  Gluc: 166 mg/dL / Ketone: x  / Bili: x / Urobili: x   Blood: x / Protein: x / Nitrite: x   Leuk Esterase: x / RBC: x / WBC x   Sq Epi: x / Non Sq Epi: x / Bacteria: x      ABG:  pH, Arterial: 7.22 (09-21-23 @ 11:53)  pCO2, Arterial: 26 mmHg (09-21-23 @ 11:53)  pO2, Arterial: 125 mmHg (09-21-23 @ 11:53)  pH, Arterial: 7.18 (09-21-23 @ 05:15)  pCO2, Arterial: 25 mmHg (09-21-23 @ 05:15)  pO2, Arterial: 170 mmHg (09-21-23 @ 05:15)  pH, Arterial: 7.34 (09-21-23 @ 00:15)  pCO2, Arterial: 24 mmHg (09-21-23 @ 00:15)  pO2, Arterial: 190 mmHg (09-21-23 @ 00:15)  pH, Arterial: 7.25 (09-20-23 @ 17:01)  pCO2, Arterial: 27 mmHg (09-20-23 @ 17:01)  pO2, Arterial: 347 mmHg (09-20-23 @ 17:01)      vBG:  pH, Venous: 7.20 (09-20-23 @ 17:10)  pCO2, Venous: 33 mmHg (09-20-23 @ 17:10)  pO2, Venous: 70 mmHg (09-20-23 @ 17:10)  HCO3, Venous: 13 mmol/L (09-20-23 @ 17:10)  pH, Venous: 7.11 (09-20-23 @ 16:29)  pCO2, Venous: 42 mmHg (09-20-23 @ 16:29)  pO2, Venous: 56 mmHg (09-20-23 @ 16:29)  HCO3, Venous: 13 mmol/L (09-20-23 @ 16:29)    Micro:    Culture - Blood (collected 09-18-23 @ 14:55)  Source: .Blood Blood-Peripheral  Preliminary Report (09-20-23 @ 19:01):    No growth at 48 Hours    Culture - Blood (collected 09-18-23 @ 14:52)  Source: .Blood Blood-Peripheral  Preliminary Report (09-20-23 @ 19:01):    No growth at 48 Hours    Culture - Blood (collected 09-07-23 @ 06:39)  Source: .Blood Blood-Peripheral  Final Report (09-12-23 @ 09:00):    No growth at 5 days    Culture - Blood (collected 09-07-23 @ 06:28)  Source: .Blood Blood-Peripheral  Final Report (09-12-23 @ 09:00):    No growth at 5 days          RADIOLOGY & ADDITIONAL TESTS: Reviewed.       HPI: Patient is a 58y old  Female with history of unknown metastatic cancer with colon and pancreas masses and DVT on Eloquis, who presents with a chief complaint of Diarrhea, blood in stool (19 Sep 2023 10:41)    INTERVAL HPI/OVERNIGHT EVENTS: Patient requiring IV vasopressor support with Levophed 4.22mL/Hr, phenylephrine 16.9mL/Hr, and vasopressin 12mL/Hr. Metabolic acidosis worsening despite CRRT and bicarbonate infusion.     SUBJECTIVE: Patient seen and examined at bedside with  present in MICU. Patient on ventilator and sedated on propofol; therefore, patient unable to participate in interview. Pt currently on CRRT.     ROS: Unable to assess due to sedation    VITAL SIGNS:  ICU Vital Signs Last 24 Hrs  T(C): 33.4 (21 Sep 2023 08:00), Max: 36.7 (20 Sep 2023 16:20)  T(F): 92.2 (21 Sep 2023 08:00), Max: 98 (20 Sep 2023 16:20)  HR: 94 (21 Sep 2023 12:00) (87 - 121)  BP: 124/73 (20 Sep 2023 16:25) (108/70 - 132/114)  BP(mean): 89 (20 Sep 2023 16:25) (82 - 122)  ABP: 98/69 (21 Sep 2023 12:00) (79/63 - 134/83)  ABP(mean): 82 (21 Sep 2023 12:00) (65 - 103)  RR: 25 (21 Sep 2023 12:00) (20 - 28)  SpO2: 92% (21 Sep 2023 12:00) (92% - 100%)    O2 Parameters below as of 21 Sep 2023 08:00  Patient On (Oxygen Delivery Method): ventilator    O2 Concentration (%): 40      Mode: AC/ CMV (Assist Control/ Continuous Mandatory Ventilation), RR (machine): 28, TV (machine): 450, FiO2: 30, PEEP: 5, ITime: 0.7, MAP: 8, PIP: 18  Plateau pressure:   P/F ratio:     09-20 @ 07:01  -  09-21 @ 07:00  --------------------------------------------------------  IN: 4288.1 mL / OUT: 2532 mL / NET: 1756.1 mL    09-21 @ 07:01  -  09-21 @ 13:20  --------------------------------------------------------  IN: 2308.2 mL / OUT: 0 mL / NET: 2308.2 mL      CAPILLARY BLOOD GLUCOSE      POCT Blood Glucose.: 103 mg/dL (20 Sep 2023 18:35)      ECG: reviewed.    PHYSICAL EXAM:    GENERAL: ill appearing  HEENT: +ET tube  HEART: regular rate and rhythm, no murmurs, rubs, or gallops  LUNGS: mechanical breath sounds b/l.   ABDOMEN: Soft, nondistended, +BS  EXTREMITIES: bilateral LE edema  NERVOUS SYSTEM: sedated   SKIN: Dry  Vascular access: Right IJ non-tunneled HD catheter, central line, a-line    MEDICATIONS:  MEDICATIONS  (STANDING):  chlorhexidine 0.12% Liquid 15 milliLiter(s) Oral Mucosa every 12 hours  chlorhexidine 2% Cloths 1 Application(s) Topical daily  CRRT Treatment    <Continuous>  heparin  Infusion.  Unit(s)/Hr (16 mL/Hr) IV Continuous <Continuous>  norepinephrine Infusion 0.05 MICROgram(s)/kG/Min (4.22 mL/Hr) IV Continuous <Continuous>  pantoprazole  Injectable 40 milliGRAM(s) IV Push daily  petrolatum Ophthalmic Ointment 1 Application(s) Both EYES daily  phenylephrine    Infusion 1.001 MICROgram(s)/kG/Min (16.9 mL/Hr) IV Continuous <Continuous>  piperacillin/tazobactam IVPB.. 4.5 Gram(s) IV Intermittent every 8 hours  PrismaSATE Dialysate BK 0 / 3.5 5000 milliLiter(s) (1600 mL/Hr) CRRT <Continuous>  PrismaSOL Filtration BGK 0 / 2.5 5000 milliLiter(s) (200 mL/Hr) CRRT <Continuous>  PrismaSOL Filtration BGK 0 / 2.5 5000 milliLiter(s) (1400 mL/Hr) CRRT <Continuous>  propofol Infusion 30 MICROgram(s)/kG/Min (16.2 mL/Hr) IV Continuous <Continuous>  sodium bicarbonate  Infusion 0.167 mEq/kG/Hr (100 mL/Hr) IV Continuous <Continuous>  vancomycin  IVPB 1000 milliGRAM(s) IV Intermittent every 12 hours  vasopressin Infusion 0.08 Unit(s)/Min (12 mL/Hr) IV Continuous <Continuous>    MEDICATIONS  (PRN):  heparin   Injectable 7000 Unit(s) IV Push every 6 hours PRN For aPTT less than 40  heparin   Injectable 3500 Unit(s) IV Push every 6 hours PRN For aPTT between 40 - 57      ALLERGIES:  Allergies    No Known Allergies    Intolerances        LABS:                        6.1    32.60 )-----------( 108      ( 21 Sep 2023 11:46 )             20.0     09-21    137  |  99  |  40<H>  ----------------------------<  166<H>  4.1   |  11<L>  |  1.76<H>    Ca    7.7<L>      21 Sep 2023 11:46  Phos  5.4     09-21  Mg     2.20     09-21    TPro  5.1<L>  /  Alb  1.9<L>  /  TBili  1.6<H>  /  DBili  x   /  AST  437<H>  /  ALT  123<H>  /  AlkPhos  665<H>  09-21    PT/INR - ( 21 Sep 2023 05:15 )   PT: 27.9 sec;   INR: 2.54 ratio         PTT - ( 21 Sep 2023 11:46 )  PTT:>200.0 sec  Urinalysis Basic - ( 21 Sep 2023 11:46 )    Color: x / Appearance: x / SG: x / pH: x  Gluc: 166 mg/dL / Ketone: x  / Bili: x / Urobili: x   Blood: x / Protein: x / Nitrite: x   Leuk Esterase: x / RBC: x / WBC x   Sq Epi: x / Non Sq Epi: x / Bacteria: x      ABG:  pH, Arterial: 7.22 (09-21-23 @ 11:53)  pCO2, Arterial: 26 mmHg (09-21-23 @ 11:53)  pO2, Arterial: 125 mmHg (09-21-23 @ 11:53)  pH, Arterial: 7.18 (09-21-23 @ 05:15)  pCO2, Arterial: 25 mmHg (09-21-23 @ 05:15)  pO2, Arterial: 170 mmHg (09-21-23 @ 05:15)  pH, Arterial: 7.34 (09-21-23 @ 00:15)  pCO2, Arterial: 24 mmHg (09-21-23 @ 00:15)  pO2, Arterial: 190 mmHg (09-21-23 @ 00:15)  pH, Arterial: 7.25 (09-20-23 @ 17:01)  pCO2, Arterial: 27 mmHg (09-20-23 @ 17:01)  pO2, Arterial: 347 mmHg (09-20-23 @ 17:01)      vBG:  pH, Venous: 7.20 (09-20-23 @ 17:10)  pCO2, Venous: 33 mmHg (09-20-23 @ 17:10)  pO2, Venous: 70 mmHg (09-20-23 @ 17:10)  HCO3, Venous: 13 mmol/L (09-20-23 @ 17:10)  pH, Venous: 7.11 (09-20-23 @ 16:29)  pCO2, Venous: 42 mmHg (09-20-23 @ 16:29)  pO2, Venous: 56 mmHg (09-20-23 @ 16:29)  HCO3, Venous: 13 mmol/L (09-20-23 @ 16:29)    Micro:    Culture - Blood (collected 09-18-23 @ 14:55)  Source: .Blood Blood-Peripheral  Preliminary Report (09-20-23 @ 19:01):    No growth at 48 Hours    Culture - Blood (collected 09-18-23 @ 14:52)  Source: .Blood Blood-Peripheral  Preliminary Report (09-20-23 @ 19:01):    No growth at 48 Hours    Culture - Blood (collected 09-07-23 @ 06:39)  Source: .Blood Blood-Peripheral  Final Report (09-12-23 @ 09:00):    No growth at 5 days    Culture - Blood (collected 09-07-23 @ 06:28)  Source: .Blood Blood-Peripheral  Final Report (09-12-23 @ 09:00):    No growth at 5 days          RADIOLOGY & ADDITIONAL TESTS: Reviewed.

## 2023-09-21 NOTE — ADVANCED PRACTICE NURSE CONSULT - REASON FOR CONSULT
Attempted to see patient on Wound Care Rounds, patient not appropriate/stable for wound assessment at this time as per CAROLEE Morales at bedside, CRRT ongoing. Will follow up with patient for wound care recommendations.

## 2023-09-21 NOTE — PROGRESS NOTE ADULT - ASSESSMENT
57 yo F with Pmhx of HTN and unknown primary metastatic cancer and DVT on eliquis presents to the ED with bloody diarrhea, found to have SIRs, MESHA, hyperkalemia, lactic acidosis, elevated LFTs most likely 2/2 worsening malignancy.     ==============NEURO=============  - sedated on propofol  - previously AOx4 at baseline prior to 9/20  - unable to get CT head due to unstable status    =========CARDIOVASCULAR=========  Vasopressors: Levophed 0.05--4.22mL/Hr, phenylephrine 1.001--16.9mL/Hr, and vasopressin 0.08--12mL/Hr  - thrombus visualized in RV on ultrasound    ============RESPIRATORY==========  - on ventilator: RR 28, Tidal Volume 450, PEEP 5, FiO2 30    ============RENAL==============  #MESHA  - CR 2.23, downtrending from 3.83 9/20  - on CRRT, net positive 1.5L overnight    #Hyperkalemia, in the setting of MESHA  - s/p calcium gluconate, insulin/D50, and lokelma   - EKG shows no peaked T wave   - given past history of tumor lysis syndrome, trend TLS labs    #Metabolic Acidosis  - on CRRT  - continue with bicarbonate drip    ============GI===============  - Diet: NPO  - discontinue allopurinol today 9/21  - OG tube in place  - continue with protonix    #Diarrhea   - now resolved  - most likely related to GI malignancy    ============ENDO============  - obtain serum cortisol for hypotension    ===========HEME/ONC===========  #Anemia  - Hgb  8.3-->7.7 . pt reports no blood in stool  - Eliquis was held on admission  - Trend H/H    #DVT Prophylaxis    - heparin IV  - SCDs in place    #Malignancy - colon and pancreas masses  - S/p biopsy of colonic mass. IR was consulted for pancreatic mass biopsy but pt deferred  - Biopsy results showed adenocarcinoma-unknown primary   -Heme/onc were consulted by the ED, f/u with recs   -Surgery was consulted during last admission, pt not candidate for surgery given metastasis  - US of abdomen shows  mets in  liver.    =============ID=============  #Severe sepsis  - trend lactate   - follow up with urine culture  - continue with Zosyn IV Q8  - continue with vancomycin IV Q12    ===========Lines/Tubes/Henry===========  - Lines: Right IJ non-tunneled HD catheter, central line, a-line  - GOC:    59 yo F with Pmhx of HTN and unknown primary metastatic cancer and DVT on eliquis presents to the ED with bloody diarrhea, found to have SIRs, MESHA, hyperkalemia, lactic acidosis, elevated LFTs most likely 2/2 worsening malignancy.     ==============NEURO=============  - sedated on propofol  - previously AOx4 at baseline prior to 9/20  - unable to get CT head due to unstable status    =========CARDIOVASCULAR=========  Vasopressors: Levophed 0.05--4.22mL/Hr, phenylephrine 1.001--16.9mL/Hr, and vasopressin 0.08--12mL/Hr  - thrombus visualized in RV on ultrasound    ============RESPIRATORY==========  - on ventilator: RR 28, Tidal Volume 450, PEEP 5, FiO2 30  - Airway protection in the setting of code blue    ============RENAL==============  #MESHA  - CR 2.23, downtrending from 3.83 9/20  - on CRRT, net positive 1.5L overnight    #Hyperkalemia, in the setting of MESHA  - s/p calcium gluconate, insulin/D50, and lokelma   - EKG shows no peaked T wave   - given past history of tumor lysis syndrome, trend TLS labs    #Metabolic Acidosis  - on CRRT  - continue with bicarbonate drip    ============GI===============  - Diet: NPO  - discontinue allopurinol today 9/21  - OG tube in place  - continue with protonix    #Diarrhea   - now resolved  - most likely related to GI malignancy    ============ENDO============  - obtain serum cortisol for hypotension    ===========HEME/ONC===========  #Anemia  - Hgb  8.3-->7.7 . pt reports no blood in stool  - Eliquis was held on admission  - Trend H/H    #DVT Prophylaxis    - heparin IV  - SCDs in place    #Malignancy - colon and pancreas masses  - S/p biopsy of colonic mass. IR was consulted for pancreatic mass biopsy but pt deferred  - Biopsy results showed adenocarcinoma-unknown primary   -Heme/onc were consulted by the ED, f/u with recs   -Surgery was consulted during last admission, pt not candidate for surgery given metastasis  - US of abdomen shows  mets in  liver.    =============ID=============  #Severe sepsis  - trend lactate   - follow up with urine culture  - continue with Zosyn IV Q8  - continue with vancomycin IV Q12    ===========Lines/Tubes/Henry===========  - Lines: Right IJ non-tunneled HD catheter, central line, a-line  - GOC:

## 2023-09-21 NOTE — PROGRESS NOTE ADULT - ATTENDING COMMENTS
Agree with above. Critically ill on vent and CRRT on 3 vasopressors. Lactate 14 with severe acidosis and anemia. Also with clot in IVC, RA and RV. Not tolerating anticoagulation. Liver failure, kidney failure on CRRT, hemodynamic failure. Multiorgan failure on max support. Family at the bedside. Poor prognosis for functional recovery. Supportive care.

## 2023-09-21 NOTE — CHART NOTE - NSCHARTNOTEFT_GEN_A_CORE
: Benjie May, PGY-1    INDICATION: Critical MS    PROCEDURE:  [X] LIMITED ECHO  [X] LIMITED CHEST  [ ] LIMITED RETROPERITONEAL  [ ] LIMITED ABDOMINAL  [ ] LIMITED DVT  [ ] NEEDLE GUIDANCE VASCULAR  [ ] NEEDLE GUIDANCE THORACENTESIS  [ ] NEEDLE GUIDANCE PARACENTESIS  [ ] NEEDLE GUIDANCE PERICARDIOCENTESIS  [ ] OTHER    FINDINGS: There is a thrombus in the RV with LV apical wall flattening with RV diastole, known with previous history. Trace fluid in Gupta's pouch. Diffuse heteroechoic lesions in the liver.     INTERPRETATION: RV thrombus and LV apical wall flattening with right-sided ascites and metastases to the liver. : Benjie May, PGY-1    INDICATION: Critical MS    PROCEDURE:  [X] LIMITED ECHO  [X] LIMITED CHEST  [ ] LIMITED RETROPERITONEAL  [X] LIMITED ABDOMINAL  [ ] LIMITED DVT  [ ] NEEDLE GUIDANCE VASCULAR  [ ] NEEDLE GUIDANCE THORACENTESIS  [ ] NEEDLE GUIDANCE PARACENTESIS  [ ] NEEDLE GUIDANCE PERICARDIOCENTESIS  [ ] OTHER    FINDINGS: There is an enlarged RV greater than LV with a RV thrombus. Fluid in Gupta's pouch from ascites. Heterogenic liver.     INTERPRETATIONS: Severe right sided pressures with RV thrombus. Ascites and heterogenic liver likely secondary to malignancy. : Benjie May, PGY-1    INDICATION: Critical MS    PROCEDURE:  [X] LIMITED ECHO  [X] LIMITED CHEST  [ ] LIMITED RETROPERITONEAL  [X] LIMITED ABDOMINAL  [ ] LIMITED DVT  [ ] NEEDLE GUIDANCE VASCULAR  [ ] NEEDLE GUIDANCE THORACENTESIS  [ ] NEEDLE GUIDANCE PARACENTESIS  [ ] NEEDLE GUIDANCE PERICARDIOCENTESIS  [ ] OTHER    FINDINGS: There is an enlarged RV greater than LV with a RV thrombus. Fluid in Gupta's pouch from ascites. Heterogenic liver.     INTERPRETATIONS: Severe right sided pressures with RV thrombus. Ascites and heterogenic liver likely secondary to malignancy.      Attending Addendum:  At bedside for procedure and agree with above.  SERGEY Stringer DO, [facility]

## 2023-09-22 NOTE — CHART NOTE - NSCHARTNOTEFT_GEN_A_CORE
MICU NOTE    Called to bedside to evaluate the patient for cardiopulmonary arrest.     On physical exam, patient did not respond to verbal or noxious stimuli. No spontaneous respirations. Absent heart and breath sounds. Absent radial and carotid pulses.   Pupils are fixed and dilated, no corneal reflex. EKG rhythm strip shows asystole. Patient pronounced dead at 5:01 AM. Attending notified. MICU NOTE    Called to bedside to evaluate the patient for cardiopulmonary arrest.     On physical exam, patient did not respond to verbal or noxious stimuli. No spontaneous respirations. Absent heart and breath sounds. Absent radial and carotid pulses. Pupils are fixed and dilated, no corneal reflex. EKG rhythm strip shows asystole. Patient pronounced dead at 5:01 AM. Attending notified.

## 2023-09-22 NOTE — DISCHARGE NOTE FOR THE EXPIRED PATIENT - HOSPITAL COURSE
HPI:  57 yo F with Pmhx of HTN and unknown primary metastatic cancer and DVT on eliquis presents to the ED with bloody diarrhea. Patient reports that she was discharged from the hospital on 9/14. Since discharge she has been stable w/o any diarrhea until yesterday. She has been having watery, diarrhea multiple times per day since yesterday. It is brown in color and it sometimes has some streaks of blood mixed with it. She denies any melena or hematochezia. She has nausea and has been vomiting up food materials. She also reports to have worsening b/l LE edema which got worse over the last couple of days. She has been drinking 2-3 500cc bottle of water every day but her PO intake has also decreased. Of note. she was recently diagnosed with metastatic cancer-unknown primary. She underwent EGD and colonoscopy, pending path results. Her hospitalization was complicated by TLS and MESHA which improved prior to discharge     In the ED, her vitals were notable for tachycardia. Labs were notable for chronic but stable anemia, leukocytosis, elevated INR, PT, elevated LDH, hyperkalemia, low bicarb, elevated AG, elevated BUN/Scr, elevated ALP, AST/ALT, pH of 7.27->7.30, lactic acidosis. EKG showed sinus tachycardia.     INTERVAL HISTORY:  Patient was admitted for management of severe sepsis undergoing infectious work-up, work-up of malignancy w/ pancreatic/colonic mass w/ mets to lungs, biopsy showing adenocarcinoma w/ unknown primary. Course complicated by anemia, MESHA, and hyperkalemia. Course complicated on 9/20 by code blue - patient became more drowsy, agonal breathing, previously AOx4, unknown rhythm at time of apnea and initiation of compressions. ROSC obtained and patient transferred to MICU for post-cardiac arrest care.     Patient requiring IV vasopressor support with Levophed 4.22mL/Hr, phenylephrine 16.9mL/Hr, and vasopressin 12mL/Hr. Metabolic acidosis worsening despite CRRT and bicarbonate infusion. Despite increasing pressor requirements, patient with cardiopulmonary arrest at 5:01 am.

## 2023-09-23 LAB
CULTURE RESULTS: SIGNIFICANT CHANGE UP
CULTURE RESULTS: SIGNIFICANT CHANGE UP
SPECIMEN SOURCE: SIGNIFICANT CHANGE UP
SPECIMEN SOURCE: SIGNIFICANT CHANGE UP

## 2023-09-24 LAB
-  CANDIDA GLABRATA: SIGNIFICANT CHANGE UP
GRAM STN FLD: SIGNIFICANT CHANGE UP
METHOD TYPE: SIGNIFICANT CHANGE UP

## 2023-09-26 LAB
CULTURE RESULTS: SIGNIFICANT CHANGE UP
SPECIMEN SOURCE: SIGNIFICANT CHANGE UP

## 2023-09-27 LAB — FIBRINOGEN AG PPP IA-MCNC: 367 MG/DL — SIGNIFICANT CHANGE UP (ref 233–496)

## 2023-09-28 LAB
-  AMPHOTERICIN B: SIGNIFICANT CHANGE UP
-  ANIDULAFUNGIN: SIGNIFICANT CHANGE UP
-  CASPOFUNGIN: SIGNIFICANT CHANGE UP
-  FLUCONAZOLE: SIGNIFICANT CHANGE UP
-  MICAFUNGIN: SIGNIFICANT CHANGE UP
-  POSACONAZOLE: SIGNIFICANT CHANGE UP
-  VORICONAZOLE: SIGNIFICANT CHANGE UP
CULTURE RESULTS: SIGNIFICANT CHANGE UP
METHOD TYPE: SIGNIFICANT CHANGE UP
ORGANISM # SPEC MICROSCOPIC CNT: SIGNIFICANT CHANGE UP
SPECIMEN SOURCE: SIGNIFICANT CHANGE UP

## 2023-11-07 ENCOUNTER — APPOINTMENT (OUTPATIENT)
Dept: GASTROENTEROLOGY | Facility: HOSPITAL | Age: 58
End: 2023-11-07

## 2024-06-10 NOTE — ED ADULT TRIAGE NOTE - CCCP TRG CHIEF CMPLNT
Patient called in and stated she has completed taking the medication Paxlovid and is wanting to know what she has to do next? Also is wanting to know if she can start back taking her anti depression medication as she has not taking it in 5 days. Please advise.   diarrhea

## (undated) DEVICE — CLAMP BX HOT RAD JAW 3

## (undated) DEVICE — ELCTR GROUNDING PAD ADULT COVIDIEN

## (undated) DEVICE — TUBING MEDI-VAC W MAXIGRIP CONNECTORS 1/4"X6'

## (undated) DEVICE — LUBRICATING JELLY HR ONE SHOT 3G

## (undated) DEVICE — TUBING SUCTION NONCONDUCTIVE 6MM X 12FT

## (undated) DEVICE — DRSG CURITY GAUZE SPONGE 4 X 4" 12-PLY NON-STERILE

## (undated) DEVICE — DRSG BANDAID 0.75X3"

## (undated) DEVICE — DRSG 2X2

## (undated) DEVICE — CONTAINER FORMALIN 80ML YELLOW

## (undated) DEVICE — SALIVA EJECTOR (BLUE)

## (undated) DEVICE — DENTURE CUP PINK

## (undated) DEVICE — BIOPSY FORCEP RADIAL JAW 4 STANDARD WITH NEEDLE

## (undated) DEVICE — TUBING IV SET GRAVITY 3Y 100" MACRO

## (undated) DEVICE — PACK IV START WITH CHG

## (undated) DEVICE — BIOPSY FORCEP COLD DISP

## (undated) DEVICE — BITE BLOCK ADULT 20 X 27MM (GREEN)

## (undated) DEVICE — UNDERPAD LINEN SAVER 17 X 24"

## (undated) DEVICE — GOWN LG

## (undated) DEVICE — ELCTR ECG CONDUCTIVE ADHESIVE

## (undated) DEVICE — CATH IV SAFE BC 22G X 1" (BLUE)

## (undated) DEVICE — BASIN EMESIS 10IN GRADUATED MAUVE

## (undated) DEVICE — LINE BREATHE SAMPLNG